# Patient Record
Sex: FEMALE | Race: WHITE | Employment: UNEMPLOYED | URBAN - NONMETROPOLITAN AREA
[De-identification: names, ages, dates, MRNs, and addresses within clinical notes are randomized per-mention and may not be internally consistent; named-entity substitution may affect disease eponyms.]

---

## 2017-01-26 ENCOUNTER — OFFICE VISIT (OUTPATIENT)
Dept: FAMILY MEDICINE CLINIC | Age: 62
End: 2017-01-26

## 2017-01-26 VITALS
HEART RATE: 80 BPM | DIASTOLIC BLOOD PRESSURE: 70 MMHG | BODY MASS INDEX: 20.7 KG/M2 | SYSTOLIC BLOOD PRESSURE: 120 MMHG | HEIGHT: 68 IN | WEIGHT: 136.6 LBS

## 2017-01-26 DIAGNOSIS — F60.7 DEPENDENT PERSONALITY DISORDER (HCC): Chronic | ICD-10-CM

## 2017-01-26 DIAGNOSIS — J01.90 ACUTE NON-RECURRENT SINUSITIS, UNSPECIFIED LOCATION: Primary | ICD-10-CM

## 2017-01-26 DIAGNOSIS — F41.1 GAD (GENERALIZED ANXIETY DISORDER): Chronic | ICD-10-CM

## 2017-01-26 PROCEDURE — 99213 OFFICE O/P EST LOW 20 MIN: CPT | Performed by: FAMILY MEDICINE

## 2017-01-26 RX ORDER — DIAZEPAM 5 MG/1
5 TABLET ORAL EVERY 8 HOURS PRN
Qty: 90 TABLET | Refills: 0 | Status: SHIPPED | OUTPATIENT
Start: 2017-01-26 | End: 2017-03-14 | Stop reason: SDUPTHER

## 2017-01-26 RX ORDER — SULFAMETHOXAZOLE AND TRIMETHOPRIM 800; 160 MG/1; MG/1
1 TABLET ORAL 2 TIMES DAILY
Qty: 20 TABLET | Refills: 0 | Status: SHIPPED | OUTPATIENT
Start: 2017-01-26 | End: 2017-02-05

## 2017-01-26 ASSESSMENT — ENCOUNTER SYMPTOMS
SORE THROAT: 1
COUGH: 1
SINUS PRESSURE: 1
GASTROINTESTINAL NEGATIVE: 1
SHORTNESS OF BREATH: 0

## 2017-03-14 DIAGNOSIS — F41.1 GAD (GENERALIZED ANXIETY DISORDER): Chronic | ICD-10-CM

## 2017-03-14 RX ORDER — DIAZEPAM 5 MG/1
5 TABLET ORAL EVERY 8 HOURS PRN
Qty: 90 TABLET | Refills: 0 | Status: SHIPPED | OUTPATIENT
Start: 2017-03-14 | End: 2017-05-01 | Stop reason: SDUPTHER

## 2017-05-01 DIAGNOSIS — F41.1 GAD (GENERALIZED ANXIETY DISORDER): Chronic | ICD-10-CM

## 2017-05-01 RX ORDER — DIAZEPAM 5 MG/1
5 TABLET ORAL EVERY 8 HOURS PRN
Qty: 90 TABLET | Refills: 0 | Status: SHIPPED | OUTPATIENT
Start: 2017-05-01 | End: 2017-05-18 | Stop reason: SDUPTHER

## 2017-05-18 ENCOUNTER — OFFICE VISIT (OUTPATIENT)
Dept: FAMILY MEDICINE CLINIC | Age: 62
End: 2017-05-18

## 2017-05-18 VITALS
HEIGHT: 68 IN | HEART RATE: 88 BPM | DIASTOLIC BLOOD PRESSURE: 80 MMHG | WEIGHT: 132.4 LBS | SYSTOLIC BLOOD PRESSURE: 120 MMHG | BODY MASS INDEX: 20.07 KG/M2

## 2017-05-18 DIAGNOSIS — F41.1 GAD (GENERALIZED ANXIETY DISORDER): Chronic | ICD-10-CM

## 2017-05-18 PROCEDURE — 99213 OFFICE O/P EST LOW 20 MIN: CPT | Performed by: FAMILY MEDICINE

## 2017-05-18 RX ORDER — BACLOFEN 20 MG/1
20 TABLET ORAL 2 TIMES DAILY
Qty: 60 TABLET | Refills: 5 | Status: SHIPPED | OUTPATIENT
Start: 2017-05-18 | End: 2019-01-04 | Stop reason: SDUPTHER

## 2017-05-18 RX ORDER — MIRTAZAPINE 15 MG/1
15 TABLET, FILM COATED ORAL NIGHTLY
Qty: 30 TABLET | Refills: 3 | Status: SHIPPED | OUTPATIENT
Start: 2017-05-18 | End: 2017-08-24 | Stop reason: CLARIF

## 2017-05-18 RX ORDER — DIAZEPAM 5 MG/1
5 TABLET ORAL EVERY 8 HOURS PRN
Qty: 90 TABLET | Refills: 0 | Status: SHIPPED | OUTPATIENT
Start: 2017-05-18 | End: 2017-06-20 | Stop reason: SDUPTHER

## 2017-05-18 ASSESSMENT — ENCOUNTER SYMPTOMS
RESPIRATORY NEGATIVE: 1
SHORTNESS OF BREATH: 0
GASTROINTESTINAL NEGATIVE: 1

## 2017-06-20 DIAGNOSIS — F41.1 GAD (GENERALIZED ANXIETY DISORDER): Chronic | ICD-10-CM

## 2017-06-20 RX ORDER — DIAZEPAM 5 MG/1
5 TABLET ORAL EVERY 8 HOURS PRN
Qty: 90 TABLET | Refills: 0 | Status: SHIPPED | OUTPATIENT
Start: 2017-06-20 | End: 2017-07-24 | Stop reason: SDUPTHER

## 2017-07-24 DIAGNOSIS — F41.1 GAD (GENERALIZED ANXIETY DISORDER): Chronic | ICD-10-CM

## 2017-07-24 RX ORDER — DIAZEPAM 5 MG/1
5 TABLET ORAL EVERY 8 HOURS PRN
Qty: 90 TABLET | Refills: 0 | Status: SHIPPED | OUTPATIENT
Start: 2017-07-24 | End: 2017-08-24 | Stop reason: SDUPTHER

## 2017-08-24 ENCOUNTER — OFFICE VISIT (OUTPATIENT)
Dept: FAMILY MEDICINE CLINIC | Age: 62
End: 2017-08-24
Payer: COMMERCIAL

## 2017-08-24 VITALS
HEART RATE: 58 BPM | SYSTOLIC BLOOD PRESSURE: 124 MMHG | WEIGHT: 132.2 LBS | DIASTOLIC BLOOD PRESSURE: 70 MMHG | BODY MASS INDEX: 20.03 KG/M2 | HEIGHT: 68 IN

## 2017-08-24 DIAGNOSIS — F41.1 GAD (GENERALIZED ANXIETY DISORDER): Chronic | ICD-10-CM

## 2017-08-24 PROCEDURE — 99212 OFFICE O/P EST SF 10 MIN: CPT | Performed by: FAMILY MEDICINE

## 2017-08-24 RX ORDER — DIAZEPAM 5 MG/1
5 TABLET ORAL EVERY 8 HOURS PRN
Qty: 90 TABLET | Refills: 2 | Status: SHIPPED | OUTPATIENT
Start: 2017-08-24 | End: 2017-11-21 | Stop reason: SDUPTHER

## 2017-11-20 ENCOUNTER — TELEPHONE (OUTPATIENT)
Dept: FAMILY MEDICINE CLINIC | Age: 62
End: 2017-11-20

## 2017-11-20 RX ORDER — FLUCONAZOLE 150 MG/1
150 TABLET ORAL DAILY
Qty: 1 TABLET | Refills: 0 | Status: SHIPPED | OUTPATIENT
Start: 2017-11-20 | End: 2017-11-21 | Stop reason: ALTCHOICE

## 2017-11-20 NOTE — TELEPHONE ENCOUNTER
Patient called states thinks has yeast infection. She would like to know if you would order something for her. She does have an appointment tomorrow.

## 2017-11-21 ENCOUNTER — OFFICE VISIT (OUTPATIENT)
Dept: FAMILY MEDICINE CLINIC | Age: 62
End: 2017-11-21
Payer: COMMERCIAL

## 2017-11-21 VITALS
HEART RATE: 80 BPM | SYSTOLIC BLOOD PRESSURE: 132 MMHG | WEIGHT: 132.6 LBS | DIASTOLIC BLOOD PRESSURE: 84 MMHG | BODY MASS INDEX: 20.1 KG/M2 | HEIGHT: 68 IN

## 2017-11-21 DIAGNOSIS — F41.1 GAD (GENERALIZED ANXIETY DISORDER): Chronic | ICD-10-CM

## 2017-11-21 PROCEDURE — 99212 OFFICE O/P EST SF 10 MIN: CPT | Performed by: FAMILY MEDICINE

## 2017-11-21 RX ORDER — DIAZEPAM 5 MG/1
5 TABLET ORAL EVERY 8 HOURS PRN
Qty: 90 TABLET | Refills: 2 | Status: SHIPPED | OUTPATIENT
Start: 2017-11-21 | End: 2019-01-04 | Stop reason: SDUPTHER

## 2017-11-21 ASSESSMENT — ENCOUNTER SYMPTOMS
ABDOMINAL PAIN: 0
SHORTNESS OF BREATH: 0

## 2017-11-21 NOTE — PATIENT INSTRUCTIONS
play a relaxation tape while you drive a car. When should you call for help? Call 911 anytime you think you may need emergency care. For example, call if:  · You feel you cannot stop from hurting yourself or someone else. Keep the numbers for these national suicide hotlines: 2-228-497-TALK (9-172.367.8837) and 8-730-CXJSXDC (2-539.914.4417). If you or someone you know talks about suicide or feeling hopeless, get help right away. Watch closely for changes in your health, and be sure to contact your doctor if:  · You have anxiety or fear that affects your life. · You have symptoms of anxiety that are new or different from those you had before. Where can you learn more? Go to https://[employer]. org and sign in to your [employer] account. Enter P754 in the Catapulter box to learn more about \"Anxiety Disorder: Care Instructions. \"     If you do not have an account, please click on the \"Sign Up Now\" link. Current as of: July 26, 2016  Content Version: 11.3  © 1228-8365 [employer], [employer]. Care instructions adapted under license by [facility] ([facility]). If you have questions about a medical condition or this instruction, always ask your healthcare professional. [address] any warranty or liability for your use of this information.

## 2019-01-04 ENCOUNTER — OFFICE VISIT (OUTPATIENT)
Dept: FAMILY MEDICINE CLINIC | Age: 64
End: 2019-01-04
Payer: COMMERCIAL

## 2019-01-04 VITALS
DIASTOLIC BLOOD PRESSURE: 76 MMHG | WEIGHT: 132.8 LBS | BODY MASS INDEX: 20.13 KG/M2 | HEIGHT: 68 IN | HEART RATE: 82 BPM | SYSTOLIC BLOOD PRESSURE: 132 MMHG

## 2019-01-04 DIAGNOSIS — M15.9 PRIMARY OSTEOARTHRITIS INVOLVING MULTIPLE JOINTS: ICD-10-CM

## 2019-01-04 DIAGNOSIS — Z00.00 WELL ADULT HEALTH CHECK: Primary | ICD-10-CM

## 2019-01-04 DIAGNOSIS — N95.1 SYMPTOMATIC MENOPAUSAL OR FEMALE CLIMACTERIC STATES: ICD-10-CM

## 2019-01-04 DIAGNOSIS — Z12.39 BREAST CANCER SCREENING: ICD-10-CM

## 2019-01-04 DIAGNOSIS — M79.7 FIBROMYALGIA: Chronic | ICD-10-CM

## 2019-01-04 DIAGNOSIS — F41.1 GAD (GENERALIZED ANXIETY DISORDER): Chronic | ICD-10-CM

## 2019-01-04 PROBLEM — M15.0 PRIMARY OSTEOARTHRITIS INVOLVING MULTIPLE JOINTS: Status: ACTIVE | Noted: 2019-01-04

## 2019-01-04 PROCEDURE — 99396 PREV VISIT EST AGE 40-64: CPT | Performed by: FAMILY MEDICINE

## 2019-01-04 RX ORDER — DIAZEPAM 5 MG/1
5 TABLET ORAL NIGHTLY PRN
Qty: 30 TABLET | Refills: 2 | Status: SHIPPED | OUTPATIENT
Start: 2019-01-04 | End: 2019-03-29 | Stop reason: SDUPTHER

## 2019-01-04 RX ORDER — BACLOFEN 20 MG/1
20 TABLET ORAL 2 TIMES DAILY
Qty: 60 TABLET | Refills: 5 | Status: SHIPPED | OUTPATIENT
Start: 2019-01-04 | End: 2020-05-21 | Stop reason: SDUPTHER

## 2019-01-04 ASSESSMENT — PATIENT HEALTH QUESTIONNAIRE - PHQ9
SUM OF ALL RESPONSES TO PHQ9 QUESTIONS 1 & 2: 1
2. FEELING DOWN, DEPRESSED OR HOPELESS: 0
SUM OF ALL RESPONSES TO PHQ QUESTIONS 1-9: 1
SUM OF ALL RESPONSES TO PHQ QUESTIONS 1-9: 1
1. LITTLE INTEREST OR PLEASURE IN DOING THINGS: 1

## 2019-01-04 ASSESSMENT — ENCOUNTER SYMPTOMS
RESPIRATORY NEGATIVE: 1
GASTROINTESTINAL NEGATIVE: 1
SHORTNESS OF BREATH: 0

## 2019-02-18 ENCOUNTER — TELEPHONE (OUTPATIENT)
Dept: FAMILY MEDICINE CLINIC | Age: 64
End: 2019-02-18

## 2019-02-18 RX ORDER — FLUCONAZOLE 150 MG/1
150 TABLET ORAL DAILY
Qty: 1 TABLET | Refills: 0 | Status: SHIPPED | OUTPATIENT
Start: 2019-02-18 | End: 2019-02-19

## 2019-02-20 ENCOUNTER — TELEPHONE (OUTPATIENT)
Dept: FAMILY MEDICINE CLINIC | Age: 64
End: 2019-02-20

## 2019-03-04 ENCOUNTER — HOSPITAL ENCOUNTER (OUTPATIENT)
Dept: WOMENS IMAGING | Age: 64
Discharge: HOME | End: 2019-03-04
Payer: COMMERCIAL

## 2019-03-04 DIAGNOSIS — N95.1 SYMPTOMATIC MENOPAUSAL OR FEMALE CLIMACTERIC STATES: ICD-10-CM

## 2019-03-04 PROCEDURE — 77080 DXA BONE DENSITY AXIAL: CPT

## 2019-03-05 ENCOUNTER — TELEPHONE (OUTPATIENT)
Dept: FAMILY MEDICINE CLINIC | Age: 64
End: 2019-03-05

## 2019-03-29 ENCOUNTER — OFFICE VISIT (OUTPATIENT)
Dept: FAMILY MEDICINE CLINIC | Age: 64
End: 2019-03-29
Payer: COMMERCIAL

## 2019-03-29 VITALS
WEIGHT: 134.2 LBS | TEMPERATURE: 98 F | SYSTOLIC BLOOD PRESSURE: 128 MMHG | BODY MASS INDEX: 20.34 KG/M2 | DIASTOLIC BLOOD PRESSURE: 84 MMHG | HEART RATE: 88 BPM | HEIGHT: 68 IN

## 2019-03-29 DIAGNOSIS — F41.1 GAD (GENERALIZED ANXIETY DISORDER): Chronic | ICD-10-CM

## 2019-03-29 DIAGNOSIS — J11.1 INFLUENZA: Primary | ICD-10-CM

## 2019-03-29 PROCEDURE — 99213 OFFICE O/P EST LOW 20 MIN: CPT | Performed by: FAMILY MEDICINE

## 2019-03-29 RX ORDER — PROMETHAZINE HYDROCHLORIDE AND CODEINE PHOSPHATE 6.25; 1 MG/5ML; MG/5ML
5 SYRUP ORAL EVERY 4 HOURS PRN
Qty: 120 ML | Refills: 1 | Status: SHIPPED | OUTPATIENT
Start: 2019-03-29 | End: 2019-04-03

## 2019-03-29 RX ORDER — DIAZEPAM 5 MG/1
5 TABLET ORAL NIGHTLY PRN
Qty: 30 TABLET | Refills: 2 | Status: SHIPPED | OUTPATIENT
Start: 2019-03-29 | End: 2019-06-28 | Stop reason: SDUPTHER

## 2019-06-28 ENCOUNTER — OFFICE VISIT (OUTPATIENT)
Dept: FAMILY MEDICINE CLINIC | Age: 64
End: 2019-06-28
Payer: COMMERCIAL

## 2019-06-28 VITALS
SYSTOLIC BLOOD PRESSURE: 126 MMHG | BODY MASS INDEX: 20.61 KG/M2 | HEIGHT: 68 IN | DIASTOLIC BLOOD PRESSURE: 68 MMHG | HEART RATE: 80 BPM | WEIGHT: 136 LBS

## 2019-06-28 DIAGNOSIS — F51.01 PRIMARY INSOMNIA: ICD-10-CM

## 2019-06-28 DIAGNOSIS — F41.1 GAD (GENERALIZED ANXIETY DISORDER): Primary | Chronic | ICD-10-CM

## 2019-06-28 PROCEDURE — 99213 OFFICE O/P EST LOW 20 MIN: CPT | Performed by: FAMILY MEDICINE

## 2019-06-28 RX ORDER — FLUCONAZOLE 150 MG/1
150 TABLET ORAL DAILY
Qty: 1 TABLET | Refills: 0 | Status: SHIPPED | OUTPATIENT
Start: 2019-06-28 | End: 2019-06-29

## 2019-06-28 RX ORDER — M-VIT,TX,IRON,MINS/CALC/FOLIC 27MG-0.4MG
1 TABLET ORAL DAILY
COMMUNITY

## 2019-06-28 RX ORDER — DIAZEPAM 5 MG/1
5 TABLET ORAL NIGHTLY PRN
Qty: 30 TABLET | Refills: 2 | Status: SHIPPED | OUTPATIENT
Start: 2019-06-28 | End: 2019-09-25 | Stop reason: SDUPTHER

## 2019-06-28 ASSESSMENT — ENCOUNTER SYMPTOMS
RESPIRATORY NEGATIVE: 1
GASTROINTESTINAL NEGATIVE: 1
ABDOMINAL PAIN: 0
SHORTNESS OF BREATH: 0

## 2019-06-28 NOTE — PROGRESS NOTES
fluconazole (DIFLUCAN) 150 MG tablet     Sig: Take 1 tablet by mouth daily for 1 day     Dispense:  1 tablet     Refill:  0           Electronically signed by Chelsey Meléndez 6/28/2019 at 12:45 PM

## 2019-07-08 ENCOUNTER — HOSPITAL ENCOUNTER (OUTPATIENT)
Dept: WOMENS IMAGING | Age: 64
Discharge: HOME | End: 2019-07-08
Payer: COMMERCIAL

## 2019-07-08 DIAGNOSIS — Z12.39 BREAST CANCER SCREENING: ICD-10-CM

## 2019-07-08 PROCEDURE — 77067 SCR MAMMO BI INCL CAD: CPT

## 2019-07-11 ENCOUNTER — HOSPITAL ENCOUNTER (OUTPATIENT)
Dept: WOMENS IMAGING | Age: 64
Discharge: HOME | End: 2019-07-11
Payer: COMMERCIAL

## 2019-07-11 DIAGNOSIS — Z00.6 ENCOUNTER FOR EXAMINATION FOR NORMAL COMPARISON OR CONTROL IN CLINICAL RESEARCH PROGRAM: ICD-10-CM

## 2019-07-11 PROCEDURE — 3209999900 MAM COMPARISON OF OUTSIDE IMAGES

## 2019-09-18 ENCOUNTER — TELEPHONE (OUTPATIENT)
Dept: FAMILY MEDICINE CLINIC | Age: 64
End: 2019-09-18

## 2019-09-18 DIAGNOSIS — M54.12 CERVICAL RADICULOPATHY AT C5: Primary | Chronic | ICD-10-CM

## 2019-09-18 RX ORDER — HYDROCODONE BITARTRATE AND ACETAMINOPHEN 5; 325 MG/1; MG/1
1 TABLET ORAL EVERY 6 HOURS PRN
Qty: 12 TABLET | Refills: 0 | Status: SHIPPED | OUTPATIENT
Start: 2019-09-18 | End: 2019-09-21

## 2019-09-18 NOTE — TELEPHONE ENCOUNTER
Patient called [facility] has appointment scheduled tomorrow but have been called to [place] because niece is on Hospice and dying. She is unable to keep appointment tomorrow. States that they will be traveling to [place] or early tomorrow morning. She is having leg pain related to previous injury. She has used Ultram, Tylenol,ice, heat and TENS unit. States none of that has helped with the pain. She is wondering if you would order pain medication for her to help her have pain under control to travel and deal with her niece dying. She uses [facility] in [place]. She will check there later today.

## 2019-09-25 DIAGNOSIS — F41.1 GAD (GENERALIZED ANXIETY DISORDER): Chronic | ICD-10-CM

## 2019-09-25 DIAGNOSIS — F51.01 PRIMARY INSOMNIA: ICD-10-CM

## 2019-09-25 RX ORDER — DIAZEPAM 5 MG/1
5 TABLET ORAL NIGHTLY PRN
Qty: 30 TABLET | Refills: 2 | Status: SHIPPED | OUTPATIENT
Start: 2019-09-25 | End: 2019-12-26 | Stop reason: SDUPTHER

## 2019-10-02 ENCOUNTER — OFFICE VISIT (OUTPATIENT)
Dept: FAMILY MEDICINE CLINIC | Age: 64
End: 2019-10-02
Payer: COMMERCIAL

## 2019-10-02 VITALS
WEIGHT: 138 LBS | DIASTOLIC BLOOD PRESSURE: 84 MMHG | HEIGHT: 68 IN | SYSTOLIC BLOOD PRESSURE: 130 MMHG | BODY MASS INDEX: 20.92 KG/M2 | HEART RATE: 84 BPM

## 2019-10-02 DIAGNOSIS — F51.01 PRIMARY INSOMNIA: ICD-10-CM

## 2019-10-02 DIAGNOSIS — F41.1 GAD (GENERALIZED ANXIETY DISORDER): Primary | ICD-10-CM

## 2019-10-02 DIAGNOSIS — M79.7 FIBROMYALGIA: ICD-10-CM

## 2019-10-02 PROCEDURE — 99213 OFFICE O/P EST LOW 20 MIN: CPT | Performed by: FAMILY MEDICINE

## 2019-10-02 ASSESSMENT — ENCOUNTER SYMPTOMS
SHORTNESS OF BREATH: 0
GASTROINTESTINAL NEGATIVE: 1
RESPIRATORY NEGATIVE: 1

## 2019-12-26 RX ORDER — DIAZEPAM 5 MG/1
5 TABLET ORAL NIGHTLY PRN
Qty: 30 TABLET | Refills: 2 | Status: SHIPPED | OUTPATIENT
Start: 2019-12-26 | End: 2020-03-23 | Stop reason: SDUPTHER

## 2019-12-26 NOTE — TELEPHONE ENCOUNTER
[place] called requesting a refill on the following medications:  Requested Prescriptions     Pending Prescriptions Disp Refills    diazepam (VALIUM) 5 MG tablet 30 tablet 2     Sig: Take 1 tablet by mouth nightly as needed for Anxiety for up to 30 days.        Date of last visit: 10/2/2019  Date of next visit (if applicable):1/2/2020  Date of last refill: 09/25/2019  Pharmacy Name: [address] [place] [address]      Thanks,  Marylynn Goltz, [address] [place] [address]

## 2019-12-26 NOTE — TELEPHONE ENCOUNTER
Cristela Ramirez called requesting a refill on the following medications:  Requested Prescriptions     Pending Prescriptions Disp Refills    diazepam (VALIUM) 5 MG tablet 30 tablet 2     Sig: Take 1 tablet by mouth nightly as needed for Anxiety for up to 30 days. Pharmacy verified: [facility]      Date of last visit: 10/2/19  Date of next visit (if applicable): 5/9/8958    Patient states she runs out on 12/28

## 2020-01-02 ENCOUNTER — OFFICE VISIT (OUTPATIENT)
Dept: FAMILY MEDICINE CLINIC | Age: 65
End: 2020-01-02
Payer: COMMERCIAL

## 2020-01-02 VITALS
BODY MASS INDEX: 21.01 KG/M2 | SYSTOLIC BLOOD PRESSURE: 134 MMHG | HEART RATE: 84 BPM | WEIGHT: 138.6 LBS | DIASTOLIC BLOOD PRESSURE: 76 MMHG | HEIGHT: 68 IN

## 2020-01-02 PROCEDURE — 99213 OFFICE O/P EST LOW 20 MIN: CPT | Performed by: FAMILY MEDICINE

## 2020-01-02 SDOH — ECONOMIC STABILITY: FOOD INSECURITY: WITHIN THE PAST 12 MONTHS, THE FOOD YOU BOUGHT JUST DIDN'T LAST AND YOU DIDN'T HAVE MONEY TO GET MORE.: NEVER TRUE

## 2020-01-02 SDOH — ECONOMIC STABILITY: TRANSPORTATION INSECURITY
IN THE PAST 12 MONTHS, HAS LACK OF TRANSPORTATION KEPT YOU FROM MEETINGS, WORK, OR FROM GETTING THINGS NEEDED FOR DAILY LIVING?: NO

## 2020-01-02 SDOH — ECONOMIC STABILITY: TRANSPORTATION INSECURITY
IN THE PAST 12 MONTHS, HAS THE LACK OF TRANSPORTATION KEPT YOU FROM MEDICAL APPOINTMENTS OR FROM GETTING MEDICATIONS?: NO

## 2020-01-02 SDOH — ECONOMIC STABILITY: FOOD INSECURITY: WITHIN THE PAST 12 MONTHS, YOU WORRIED THAT YOUR FOOD WOULD RUN OUT BEFORE YOU GOT MONEY TO BUY MORE.: NEVER TRUE

## 2020-01-02 SDOH — ECONOMIC STABILITY: INCOME INSECURITY: HOW HARD IS IT FOR YOU TO PAY FOR THE VERY BASICS LIKE FOOD, HOUSING, MEDICAL CARE, AND HEATING?: SOMEWHAT HARD

## 2020-01-02 ASSESSMENT — ENCOUNTER SYMPTOMS
RESPIRATORY NEGATIVE: 1
ABDOMINAL PAIN: 0
GASTROINTESTINAL NEGATIVE: 1
BACK PAIN: 1
SHORTNESS OF BREATH: 0

## 2020-01-02 ASSESSMENT — PATIENT HEALTH QUESTIONNAIRE - PHQ9
2. FEELING DOWN, DEPRESSED OR HOPELESS: 1
SUM OF ALL RESPONSES TO PHQ QUESTIONS 1-9: 1
SUM OF ALL RESPONSES TO PHQ QUESTIONS 1-9: 1
SUM OF ALL RESPONSES TO PHQ9 QUESTIONS 1 & 2: 1
1. LITTLE INTEREST OR PLEASURE IN DOING THINGS: 0

## 2020-01-02 NOTE — PROGRESS NOTES
[facility]  [address] [facility]  Dept: [phone]  Dept Fax: [phone]: [phone]    Visit Date: 1/2/2020    Adriane Aguilar is a 62 y. o.female who presents today for:   Chief Complaint   Patient presents with    3 Month Follow-Up    Anxiety         HPI:      There was a time when Pandora Crigler was under different insurance and during that time (2018) she fractured her left hip and was treated with pinning. We do not seem to have all those records. She is not one to follow with doctors. She has on going pain and limitation and needs ortho follow up. She had a visit with Dr. Neyda Grande I believe. Using Valium at  with good results for sleep. Personality issues continue. Living at home. Has BF. She struggles with dealing with people and has not worked much in her adult life. Current Outpatient Medications   Medication Sig Dispense Refill    diazepam (VALIUM) 5 MG tablet Take 1 tablet by mouth nightly as needed for Anxiety for up to 30 days. 30 tablet 2    Calcium-Magnesium-Vitamin D (CALCIUM 500 PO) Take 1 tablet by mouth daily      vitamin D (CHOLECALCIFEROL) 1000 UNIT TABS tablet Take 1,000 Units by mouth daily      Multiple Vitamins-Minerals (THERAPEUTIC MULTIVITAMIN-MINERALS) tablet Take 1 tablet by mouth daily      baclofen (LIORESAL) 20 MG tablet Take 1 tablet by mouth 2 times daily 60 tablet 5     No current facility-administered medications for this visit. The patient is allergic to aspirin; ibuprofen; remeron [mirtazapine]; morphine; and trazodone and nefazodone. Subjective:      Review of Systems   Constitutional: Positive for activity change. Negative for appetite change and unexpected weight change. HENT: Negative. Respiratory: Negative. Negative for shortness of breath. Cardiovascular: Negative. Negative for chest pain. Gastrointestinal: Negative.   Negative for Chronic Pain Monitoring:   RX Monitoring 12/26/2019   Attestation -   Acute Pain Prescriptions -   Periodic Controlled Substance Monitoring Possible medication side effects, risk of tolerance/dependence & alternative treatments discussed. ;No signs of potential drug abuse or diversion identified. Chronic Pain > 80 MEDD -       We have been through various SSRI choices and she will not consider different treatment. We will get well labs. She has had a flu shot. We discussed my FCI and she may elect to see Dr. Cassy Regan. ..... No follow-ups on file. Orders Placed:  Orders Placed This Encounter   Procedures    Lipid Panel     Standing Status:   Future     Standing Expiration Date:   1/1/2021     Order Specific Question:   Is Patient Fasting?/# of Hours     Answer:   12 hours    Comprehensive Metabolic Panel     Standing Status:   Future     Standing Expiration Date:   1/1/2021    CBC Auto Differential     Standing Status:   Future     Standing Expiration Date:   1/1/2021   Reny Orellana MD, Orthopedic Surgery, [facility]     Referral Priority:   Routine     Referral Type:   Eval and Treat     Referral Reason:   Specialty Services Required     Referred to Provider:   Lakeshia Mcgee MD     Requested Specialty:   Orthopedic Surgery     Number of Visits Requested:   1     Medications Prescribed:  No orders of the defined types were placed in this encounter.         Electronically signed by Cherelle Ramsey, [facility] 1/2/2020 at 9:15 AM

## 2020-01-16 ENCOUNTER — NURSE ONLY (OUTPATIENT)
Dept: FAMILY MEDICINE CLINIC | Age: 65
End: 2020-01-16
Payer: COMMERCIAL

## 2020-01-16 ENCOUNTER — TELEPHONE (OUTPATIENT)
Dept: FAMILY MEDICINE CLINIC | Age: 65
End: 2020-01-16

## 2020-01-16 LAB
ALBUMIN SERPL-MCNC: 4.9 G/DL (ref 3.5–5.1)
ALP BLD-CCNC: 96 U/L (ref 38–126)
ALT SERPL-CCNC: 15 U/L (ref 11–66)
ANION GAP SERPL CALCULATED.3IONS-SCNC: 10 MEQ/L (ref 8–16)
AST SERPL-CCNC: 19 U/L (ref 5–40)
BASOPHILS # BLD: 0.7 %
BASOPHILS ABSOLUTE: 0.1 THOU/MM3 (ref 0–0.1)
BILIRUB SERPL-MCNC: 0.4 MG/DL (ref 0.3–1.2)
BUN BLDV-MCNC: 12 MG/DL (ref 7–22)
CALCIUM SERPL-MCNC: 10 MG/DL (ref 8.5–10.5)
CHLORIDE BLD-SCNC: 105 MEQ/L (ref 98–111)
CHOLESTEROL, TOTAL: 228 MG/DL (ref 100–199)
CO2: 27 MEQ/L (ref 23–33)
CREAT SERPL-MCNC: 0.9 MG/DL (ref 0.4–1.2)
EOSINOPHIL # BLD: 2.7 %
EOSINOPHILS ABSOLUTE: 0.2 THOU/MM3 (ref 0–0.4)
ERYTHROCYTE [DISTWIDTH] IN BLOOD BY AUTOMATED COUNT: 12.2 % (ref 11.5–14.5)
ERYTHROCYTE [DISTWIDTH] IN BLOOD BY AUTOMATED COUNT: 44.2 FL (ref 35–45)
GFR SERPL CREATININE-BSD FRML MDRD: 63 ML/MIN/1.73M2
GLUCOSE BLD-MCNC: 96 MG/DL (ref 70–108)
HCT VFR BLD CALC: 47 % (ref 37–47)
HDLC SERPL-MCNC: 75 MG/DL
HEMOGLOBIN: 15.7 GM/DL (ref 12–16)
IMMATURE GRANS (ABS): 0.02 THOU/MM3 (ref 0–0.07)
IMMATURE GRANULOCYTES: 0.2 %
LDL CHOLESTEROL CALCULATED: 136 MG/DL
LYMPHOCYTES # BLD: 28.1 %
LYMPHOCYTES ABSOLUTE: 2.4 THOU/MM3 (ref 1–4.8)
MCH RBC QN AUTO: 32.7 PG (ref 26–33)
MCHC RBC AUTO-ENTMCNC: 33.4 GM/DL (ref 32.2–35.5)
MCV RBC AUTO: 97.9 FL (ref 81–99)
MONOCYTES # BLD: 6.9 %
MONOCYTES ABSOLUTE: 0.6 THOU/MM3 (ref 0.4–1.3)
NUCLEATED RED BLOOD CELLS: 0 /100 WBC
PLATELET # BLD: 258 THOU/MM3 (ref 130–400)
PMV BLD AUTO: 9.5 FL (ref 9.4–12.4)
POTASSIUM SERPL-SCNC: 4.2 MEQ/L (ref 3.5–5.2)
RBC # BLD: 4.8 MILL/MM3 (ref 4.2–5.4)
SEG NEUTROPHILS: 61.4 %
SEGMENTED NEUTROPHILS ABSOLUTE COUNT: 5.2 THOU/MM3 (ref 1.8–7.7)
SODIUM BLD-SCNC: 142 MEQ/L (ref 135–145)
TOTAL PROTEIN: 7.4 G/DL (ref 6.1–8)
TRIGL SERPL-MCNC: 86 MG/DL (ref 0–199)
WBC # BLD: 8.4 THOU/MM3 (ref 4.8–10.8)

## 2020-01-16 PROCEDURE — 36415 COLL VENOUS BLD VENIPUNCTURE: CPT | Performed by: FAMILY MEDICINE

## 2020-03-23 RX ORDER — DIAZEPAM 5 MG/1
5 TABLET ORAL NIGHTLY PRN
Qty: 30 TABLET | Refills: 5 | Status: SHIPPED | OUTPATIENT
Start: 2020-03-23 | End: 2020-08-26 | Stop reason: SDUPTHER

## 2020-03-23 NOTE — TELEPHONE ENCOUNTER
Controlled Substance Monitoring:    Acute and Chronic Pain Monitoring:   RX Monitoring 3/23/2020   Attestation -   Acute Pain Prescriptions -   Periodic Controlled Substance Monitoring Possible medication side effects, risk of tolerance/dependence & alternative treatments discussed. ;No signs of potential drug abuse or diversion identified.    Chronic Pain > 80 MEDD -

## 2020-05-21 RX ORDER — BACLOFEN 20 MG/1
20 TABLET ORAL 2 TIMES DAILY
Qty: 60 TABLET | Refills: 5 | Status: SHIPPED | OUTPATIENT
Start: 2020-05-21 | End: 2020-08-26 | Stop reason: SDUPTHER

## 2020-08-26 ENCOUNTER — OFFICE VISIT (OUTPATIENT)
Dept: FAMILY MEDICINE CLINIC | Age: 65
End: 2020-08-26
Payer: COMMERCIAL

## 2020-08-26 VITALS
DIASTOLIC BLOOD PRESSURE: 72 MMHG | WEIGHT: 141.4 LBS | OXYGEN SATURATION: 98 % | TEMPERATURE: 97.4 F | SYSTOLIC BLOOD PRESSURE: 120 MMHG | HEIGHT: 68 IN | RESPIRATION RATE: 14 BRPM | BODY MASS INDEX: 21.43 KG/M2 | HEART RATE: 66 BPM

## 2020-08-26 PROCEDURE — 99213 OFFICE O/P EST LOW 20 MIN: CPT | Performed by: FAMILY MEDICINE

## 2020-08-26 RX ORDER — DIAZEPAM 5 MG/1
5 TABLET ORAL NIGHTLY PRN
Qty: 30 TABLET | Refills: 4 | Status: SHIPPED | OUTPATIENT
Start: 2020-08-26 | End: 2021-01-26 | Stop reason: SDUPTHER

## 2020-08-26 RX ORDER — BACLOFEN 20 MG/1
20 TABLET ORAL 2 TIMES DAILY PRN
Qty: 60 TABLET | Refills: 5 | Status: SHIPPED | OUTPATIENT
Start: 2020-08-26 | End: 2021-02-24 | Stop reason: SDUPTHER

## 2020-08-26 ASSESSMENT — ENCOUNTER SYMPTOMS: SHORTNESS OF BREATH: 0

## 2020-08-26 NOTE — PROGRESS NOTES
[address] [place] [address] [address], [address], [place]  Phone:  [phone]  VUI:[phone]       Name: Ruperto Cheek  : 1955    Chief Complaint   Patient presents with    Annual Exam     [address] Maintenance     Pap scheduled next month with GYN, Going to GI for Colonoscopy       HPI:     Ruperto Cheek is a 60 y.o. female who presents today for     HPI    Patient with h/o CAMILLE and insomnia but only uses valium 5 mg. Starts crying as she talks about stress -- selling her home, financing issues.  passed away of 4 years ago. Has children and son-in-law who do help. Has failed a couple of SSRI/Remeron. Seeing a boyfriend. Will see Gyn for above prevention care. Denies confusion, dizziness, near falls. Also with fibromyalgia -- elavil did not work for her. Current Outpatient Medications:     Olive Leaf Extract 150 MG CAPS, Take by mouth daily, Disp: , Rfl:     diazePAM (VALIUM) 5 MG tablet, Take 1 tablet by mouth nightly as needed for Anxiety for up to 30 days. , Disp: 30 tablet, Rfl: 4    baclofen (LIORESAL) 20 MG tablet, Take 1 tablet by mouth 2 times daily as needed (muscle pain), Disp: 60 tablet, Rfl: 5    Calcium-Magnesium-Vitamin D (CALCIUM 500 PO), Take 1 tablet by mouth daily, Disp: , Rfl:     vitamin D (CHOLECALCIFEROL) 1000 UNIT TABS tablet, Take 1,000 Units by mouth daily, Disp: , Rfl:     Multiple Vitamins-Minerals (THERAPEUTIC MULTIVITAMIN-MINERALS) tablet, Take 1 tablet by mouth daily, Disp: , Rfl:     Allergies   Allergen Reactions    Aspirin Nausea Only    Elavil [Amitriptyline Hcl]      Felt foggy and did not feel right    Ibuprofen Other (See Comments)     Abdomen pain     Remeron [Mirtazapine] Other (See Comments)     CNS side effects.  Morphine Nausea And Vomiting    Trazodone And Nefazodone Nausea Only       Review of Systems   Constitutional: Negative for fatigue and fever.    Respiratory: Negative for shortness of breath. Cardiovascular: Negative for chest pain and leg swelling. Psychiatric/Behavioral: The patient is nervous/anxious. Objective:     /72 (Site: Left Upper Arm, Position: Sitting, Cuff Size: Medium Adult)   Pulse 66   Temp 97.4 °F (36.3 °C) (Temporal)   Resp 14   Ht 5' 8\" (1.727 m)   Wt 141 lb 6.4 oz (64.1 kg)   SpO2 98%   BMI 21.50 kg/m²     Physical Exam  Constitutional:       General: She is not in acute distress. Appearance: Normal appearance. She is not ill-appearing. Cardiovascular:      Rate and Rhythm: Normal rate and regular rhythm. Heart sounds: No murmur. Pulmonary:      Effort: Pulmonary effort is normal. No respiratory distress. Breath sounds: Normal breath sounds. No wheezing. Musculoskeletal:         General: No swelling. Neurological:      Mental Status: She is alert. Psychiatric:         Attention and Perception: Attention normal.         Mood and Affect: Mood is anxious. Speech: Speech normal.         Behavior: Behavior normal.         Thought Content: Thought content normal.         Cognition and Memory: Cognition normal.         Assessment/Plan:     Marvin Redd was seen today for annual exam and health maintenance. Diagnoses and all orders for this visit:    CAMILLE (generalized anxiety disorder)  -     diazePAM (VALIUM) 5 MG tablet; Take 1 tablet by mouth nightly as needed for Anxiety for up to 30 days.  -     Basic Metabolic Panel; Future  -     CBC Auto Differential; Future  -     Hepatic Function Panel; Future  -     Lipid Panel; Future    Primary insomnia  -     diazePAM (VALIUM) 5 MG tablet; Take 1 tablet by mouth nightly as needed for Anxiety for up to 30 days.  -     Basic Metabolic Panel; Future  -     CBC Auto Differential; Future  -     Hepatic Function Panel; Future  -     Lipid Panel; Future    Fibromyalgia  -     diazePAM (VALIUM) 5 MG tablet;  Take 1 tablet by mouth nightly as needed for Anxiety for up to 30 days.  -     Basic

## 2020-08-26 NOTE — PATIENT INSTRUCTIONS
Victor Hugo Bryant your records show you are due for the Shingrix vaccination. This is a vaccine to protect you from getting shingles. It is 90% plus protective. This is a great improvement over the previous vaccine. Many insurance plans are covering this vaccine. We do recommend you consider being vaccinated with this product. Shingrix is available through our office or your local pharmacy depending on your insurance coverage. We would like you to contact your insurance company to:   1. Check Coverage   2. Find out where you should receive this; our office or your local pharmacy. Thank you! Victor Hugo Bryant your records indicate that you are due for a Tetanus vaccine. We would like you to contact your insurance company to:   1. Check Coverage   2. Find out where you should receive this; our office or your local pharmacy. Thank you!

## 2020-09-29 ENCOUNTER — OFFICE VISIT (OUTPATIENT)
Dept: SURGERY | Age: 65
End: 2020-09-29

## 2020-10-08 ENCOUNTER — HOSPITAL ENCOUNTER (OUTPATIENT)
Age: 65
Discharge: HOME | End: 2020-10-08
Payer: COMMERCIAL

## 2020-10-08 DIAGNOSIS — F41.1 GAD (GENERALIZED ANXIETY DISORDER): Chronic | ICD-10-CM

## 2020-10-08 DIAGNOSIS — M79.7 FIBROMYALGIA: Chronic | ICD-10-CM

## 2020-10-08 DIAGNOSIS — F51.01 PRIMARY INSOMNIA: ICD-10-CM

## 2020-10-08 LAB
ALBUMIN SERPL-MCNC: 4.4 G/DL (ref 3.5–5.1)
ALP BLD-CCNC: 95 U/L (ref 38–126)
ALT SERPL-CCNC: 16 U/L (ref 11–66)
ANION GAP SERPL CALCULATED.3IONS-SCNC: 10 MEQ/L (ref 8–16)
AST SERPL-CCNC: 20 U/L (ref 5–40)
BASOPHILS # BLD: 0.9 %
BASOPHILS ABSOLUTE: 0.1 THOU/MM3 (ref 0–0.1)
BILIRUB SERPL-MCNC: 0.4 MG/DL (ref 0.3–1.2)
BILIRUBIN DIRECT: < 0.2 MG/DL (ref 0–0.3)
BUN BLDV-MCNC: 14 MG/DL (ref 7–22)
CALCIUM SERPL-MCNC: 9.9 MG/DL (ref 8.5–10.5)
CHLORIDE BLD-SCNC: 105 MEQ/L (ref 98–111)
CHOLESTEROL, TOTAL: 253 MG/DL (ref 100–199)
CO2: 26 MEQ/L (ref 23–33)
CREAT SERPL-MCNC: 0.7 MG/DL (ref 0.4–1.2)
EOSINOPHIL # BLD: 3.7 %
EOSINOPHILS ABSOLUTE: 0.3 THOU/MM3 (ref 0–0.4)
ERYTHROCYTE [DISTWIDTH] IN BLOOD BY AUTOMATED COUNT: 12.5 % (ref 11.5–14.5)
ERYTHROCYTE [DISTWIDTH] IN BLOOD BY AUTOMATED COUNT: 46.1 FL (ref 35–45)
GFR SERPL CREATININE-BSD FRML MDRD: 84 ML/MIN/1.73M2
GLUCOSE BLD-MCNC: 95 MG/DL (ref 70–108)
HCT VFR BLD CALC: 47.7 % (ref 37–47)
HDLC SERPL-MCNC: 62 MG/DL
HEMOGLOBIN: 15.7 GM/DL (ref 12–16)
IMMATURE GRANS (ABS): 0.02 THOU/MM3 (ref 0–0.07)
IMMATURE GRANULOCYTES: 0.3 %
LDL CHOLESTEROL CALCULATED: 169 MG/DL
LYMPHOCYTES # BLD: 31.1 %
LYMPHOCYTES ABSOLUTE: 2.1 THOU/MM3 (ref 1–4.8)
MCH RBC QN AUTO: 32.7 PG (ref 26–33)
MCHC RBC AUTO-ENTMCNC: 32.9 GM/DL (ref 32.2–35.5)
MCV RBC AUTO: 99.4 FL (ref 81–99)
MONOCYTES # BLD: 7.3 %
MONOCYTES ABSOLUTE: 0.5 THOU/MM3 (ref 0.4–1.3)
NUCLEATED RED BLOOD CELLS: 0 /100 WBC
PLATELET # BLD: 302 THOU/MM3 (ref 130–400)
PMV BLD AUTO: 9.8 FL (ref 9.4–12.4)
POTASSIUM SERPL-SCNC: 4.3 MEQ/L (ref 3.5–5.2)
RBC # BLD: 4.8 MILL/MM3 (ref 4.2–5.4)
SEG NEUTROPHILS: 56.7 %
SEGMENTED NEUTROPHILS ABSOLUTE COUNT: 3.9 THOU/MM3 (ref 1.8–7.7)
SODIUM BLD-SCNC: 141 MEQ/L (ref 135–145)
TOTAL PROTEIN: 7 G/DL (ref 6.1–8)
TRIGL SERPL-MCNC: 112 MG/DL (ref 0–199)
WBC # BLD: 6.8 THOU/MM3 (ref 4.8–10.8)

## 2020-10-08 PROCEDURE — 85025 COMPLETE CBC W/AUTO DIFF WBC: CPT

## 2020-10-08 PROCEDURE — 36415 COLL VENOUS BLD VENIPUNCTURE: CPT

## 2020-10-08 PROCEDURE — 80061 LIPID PANEL: CPT

## 2020-10-08 PROCEDURE — 80053 COMPREHEN METABOLIC PANEL: CPT

## 2020-10-08 PROCEDURE — 82248 BILIRUBIN DIRECT: CPT

## 2020-10-12 ENCOUNTER — TELEPHONE (OUTPATIENT)
Dept: FAMILY MEDICINE CLINIC | Age: 65
End: 2020-10-12

## 2020-10-12 NOTE — RESULT ENCOUNTER NOTE
Please let patient know that her labs are within acceptable limits except her cholesterol is which elevated more than previously. The LDL (sticky cholesterol) increased from 136 to 169; it is best to keep it closer to 100 or <100. to prevent clogging of arteries a diet change is recommended but medications sometimes is needed. I encouraged higher amount of vegetables, and nuts. Reduce red meat, add seafood as an alternative. Use mor legumes/soup beans as lean protein. Good activity levels are also helpful. Will follow up at next office visit. Thank you.

## 2020-10-12 NOTE — TELEPHONE ENCOUNTER
----- Message from Tarsha Roque MD sent at 10/12/2020  7:36 AM EDT -----  Please let patient know that her labs are within acceptable limits except her cholesterol is which elevated more than previously. The LDL (sticky cholesterol) increased from 136 to 169; it is best to keep it closer to 100 or <100. to prevent clogging of arteries a diet change is recommended but medications sometimes is needed. I encouraged higher amount of vegetables, and nuts. Reduce red meat, add seafood as an alternative. Use mor legumes/soup beans as lean protein. Good activity levels are also helpful. Will follow up at next office visit. Thank you.

## 2020-11-19 ENCOUNTER — OFFICE VISIT (OUTPATIENT)
Dept: SURGERY | Age: 65
End: 2020-11-19
Payer: COMMERCIAL

## 2020-11-19 ENCOUNTER — PREP FOR PROCEDURE (OUTPATIENT)
Dept: SURGERY | Age: 65
End: 2020-11-19

## 2020-11-19 ENCOUNTER — TELEPHONE (OUTPATIENT)
Dept: SURGERY | Age: 65
End: 2020-11-19

## 2020-11-19 ENCOUNTER — HOSPITAL ENCOUNTER (OUTPATIENT)
Age: 65
Setting detail: SPECIMEN
Discharge: HOME | End: 2020-11-19
Payer: COMMERCIAL

## 2020-11-19 VITALS
DIASTOLIC BLOOD PRESSURE: 65 MMHG | HEIGHT: 68 IN | WEIGHT: 142 LBS | BODY MASS INDEX: 21.52 KG/M2 | SYSTOLIC BLOOD PRESSURE: 130 MMHG | RESPIRATION RATE: 15 BRPM | TEMPERATURE: 96.7 F | HEART RATE: 100 BPM | OXYGEN SATURATION: 98 %

## 2020-11-19 PROCEDURE — U0003 INFECTIOUS AGENT DETECTION BY NUCLEIC ACID (DNA OR RNA); SEVERE ACUTE RESPIRATORY SYNDROME CORONAVIRUS 2 (SARS-COV-2) (CORONAVIRUS DISEASE [COVID-19]), AMPLIFIED PROBE TECHNIQUE, MAKING USE OF HIGH THROUGHPUT TECHNOLOGIES AS DESCRIBED BY CMS-2020-01-R: HCPCS

## 2020-11-19 PROCEDURE — 99213 OFFICE O/P EST LOW 20 MIN: CPT | Performed by: SURGERY

## 2020-11-19 RX ORDER — VITAMIN B COMPLEX
1 CAPSULE ORAL DAILY
COMMUNITY

## 2020-11-19 RX ORDER — SODIUM CHLORIDE 450 MG/100ML
INJECTION, SOLUTION INTRAVENOUS CONTINUOUS
Status: CANCELLED | OUTPATIENT
Start: 2020-11-19

## 2020-11-19 ASSESSMENT — ENCOUNTER SYMPTOMS
EYE REDNESS: 0
SINUS PAIN: 0
CHEST TIGHTNESS: 0
STRIDOR: 0
RESPIRATORY NEGATIVE: 1
RHINORRHEA: 0
TROUBLE SWALLOWING: 0
ABDOMINAL PAIN: 1
APNEA: 0
DIARRHEA: 1
PHOTOPHOBIA: 0
ALLERGIC/IMMUNOLOGIC NEGATIVE: 1
BACK PAIN: 0
FACIAL SWELLING: 0
VOICE CHANGE: 0
EYE ITCHING: 0
EYE DISCHARGE: 0
WHEEZING: 0
SORE THROAT: 0
EYE PAIN: 0
EYES NEGATIVE: 1
CONSTIPATION: 1
SINUS PRESSURE: 0
COUGH: 0
COLOR CHANGE: 0
SHORTNESS OF BREATH: 0
CHOKING: 0

## 2020-11-19 NOTE — TELEPHONE ENCOUNTER
Scheduled Kayla Blanchard for a colonoscopy w/anesthesia on Wed 11/25 at 9am & she will arrive at 8am. The prep instructions & covid order were given to patient.

## 2020-11-19 NOTE — PROGRESS NOTES
[address] [place] [id number]  Dept: [phone]  Dept Fax: [phone]  Loc: [phone]    Visit Date: 11/19/2020    Phuong Villalta is a 60 y.o. female who presentstoday for:  Chief Complaint   Patient presents with    Surgical Consult     NP est pt last seen 2016- colonoscopy consult       HPI:     HPI 61-year-old white female with father had colon carcinoma and we have followed with colonoscopies over several years she also had a sigmoid colon resection for diverticular disease about 6 years ago she has a long history of irritable bowel fibromyalgia is currently struggling in this Covid environment not being able to get out and about nonetheless she has had no real change in bowel habits she still has classic irritable bowel symptoms with constipation followed by bouts of diarrhea and abdominal bloating her last colonoscopy was 5-1/2 years ago and per American Cancer Society guidelines for family history of colon cancer she is here to discuss colonoscopy she has had no weight loss but again does have a lot of problems with anxiety and again fibromyalgia and chronic pain    Past Medical History:   Diagnosis Date    Anxiety     Arthralgia     Arthritis     Cervical radiculopathy at C5 2/16/2015    Chronic abdominal pain     Diverticulitis     Fibromyalgia 8/21/2015    IBS (irritable bowel syndrome)     Primary osteoarthritis involving multiple joints 1/4/2019    Tension headache 2/16/2015      Past Surgical History:   Procedure Laterality Date    ABDOMINAL EXPLORATION SURGERY      APPENDECTOMY      BREAST BIOPSY Right 12/14/2016    Excisional - Dr. Oscar Olson  oct 2008    anterior with plate    CHOLECYSTECTOMY  3/4/08    Dr. Ruff  12/09/2013    Dr. Bull Miracle  11/15/11    Dr Sonia Brennan Left 06/2018    Trauma Femur    OTHER SURGICAL HISTORY  13    Sigmoid colon resection - Dr. Fortino Cummings      left    TONSILLECTOMY      as a child    UPPER GASTROINTESTINAL ENDOSCOPY  11/15/11    Dr Tulio Moser        Family History   Problem Relation Age of Onset    Colon Cancer Father     High Blood Pressure Mother     High Cholesterol Mother     Breast Cancer Maternal Aunt 36        breast    Cancer Maternal Uncle     Cancer Maternal Grandmother     Cancer Paternal Grandfather         colon        Social History     Tobacco Use    Smoking status: Current Some Day Smoker     Packs/day: 0.50     Years: 15.00     Pack years: 7.50     Types: Cigarettes     Last attempt to quit: 2012     Years since quittin.9    Smokeless tobacco: Never Used    Tobacco comment: trying to use the electric [employer]   Substance Use Topics    Alcohol use: Yes     Comment: occasionally          Current Outpatient Medications   Medication Sig Dispense Refill    b complex vitamins capsule Take 1 capsule by mouth daily      Olive Leaf Extract 150 MG CAPS Take by mouth daily      diazePAM (VALIUM) 5 MG tablet Take 1 tablet by mouth nightly as needed for Anxiety for up to 30 days. 30 tablet 4    baclofen (LIORESAL) 20 MG tablet Take 1 tablet by mouth 2 times daily as needed (muscle pain) 60 tablet 5    Calcium-Magnesium-Vitamin D (CALCIUM 500 PO) Take 1 tablet by mouth daily      vitamin D (CHOLECALCIFEROL) 1000 UNIT TABS tablet Take 1,000 Units by mouth daily      Multiple Vitamins-Minerals (THERAPEUTIC MULTIVITAMIN-MINERALS) tablet Take 1 tablet by mouth daily       No current facility-administered medications for this visit. Allergies   Allergen Reactions    Aspirin Nausea Only    Elavil [Amitriptyline Hcl]      Felt foggy and did not feel right    Ibuprofen Other (See Comments)     Abdomen pain     Remeron [Mirtazapine] Other (See Comments)     CNS side effects.     Morphine Nausea And Vomiting    Trazodone And Nefazodone Nausea Only       Subjective:      Review of Systems   Constitutional: Positive for activity change and fatigue. Negative for appetite change, chills, diaphoresis, fever and unexpected weight change. HENT: Negative. Negative for congestion, dental problem, drooling, ear discharge, ear pain, facial swelling, hearing loss, mouth sores, nosebleeds, postnasal drip, rhinorrhea, sinus pressure, sinus pain, sneezing, sore throat, tinnitus, trouble swallowing and voice change. Eyes: Negative. Negative for photophobia, pain, discharge, redness, itching and visual disturbance. Respiratory: Negative. Negative for apnea, cough, choking, chest tightness, shortness of breath, wheezing and stridor. Cardiovascular: Negative. Negative for chest pain, palpitations and leg swelling. Gastrointestinal: Positive for abdominal pain, constipation and diarrhea. Endocrine: Negative. Negative for cold intolerance, heat intolerance, polydipsia, polyphagia and polyuria. Genitourinary: Negative. Negative for decreased urine volume, difficulty urinating, dyspareunia, dysuria, enuresis, flank pain, frequency, genital sores, hematuria, menstrual problem, pelvic pain, urgency, vaginal bleeding, vaginal discharge and vaginal pain. Musculoskeletal: Positive for arthralgias and myalgias. Negative for back pain, gait problem, joint swelling, neck pain and neck stiffness. Skin: Negative for color change, pallor, rash and wound. Allergic/Immunologic: Negative. Negative for environmental allergies, food allergies and immunocompromised state. Neurological: Positive for weakness. Negative for dizziness, tremors, seizures, syncope, facial asymmetry, speech difficulty, light-headedness, numbness and headaches. Hematological: Negative. Negative for adenopathy. Does not bruise/bleed easily. Psychiatric/Behavioral: Positive for dysphoric mood.  Negative for agitation, behavioral problems, confusion, decreased concentration, hallucinations, self-injury, sleep disturbance and suicidal ideas. The patient is nervous/anxious. The patient is not hyperactive. Objective:   /65 (Site: Right Upper Arm, Position: Sitting, Cuff Size: Medium Adult)   Pulse 100   Temp 96.7 °F (35.9 °C) (Tympanic)   Resp 15   Ht 5' 8\" (1.727 m)   Wt 142 lb (64.4 kg)   SpO2 98%   BMI 21.59 kg/m²     Physical Exam  Constitutional:       Appearance: She is well-developed. HENT:      Head: Normocephalic and atraumatic. Eyes:      General: No scleral icterus. Right eye: No discharge. Left eye: No discharge. Conjunctiva/sclera: Conjunctivae normal.      Pupils: Pupils are equal, round, and reactive to light. Neck:      Musculoskeletal: Normal range of motion and neck supple. Thyroid: No thyromegaly. Vascular: No JVD. Cardiovascular:      Rate and Rhythm: Normal rate and regular rhythm. Heart sounds: No murmur. No friction rub. No gallop. Pulmonary:      Effort: Pulmonary effort is normal. No respiratory distress. Breath sounds: Normal breath sounds. No stridor. No wheezing. Chest:      Chest wall: No tenderness. Abdominal:      General: There is no distension. Palpations: There is no mass. Tenderness: There is no abdominal tenderness. There is no rebound. Hernia: No hernia is present. Musculoskeletal: Normal range of motion. Skin:     General: Skin is warm and dry. Coloration: Skin is not pale. Findings: No erythema or rash. Neurological:      Mental Status: She is alert and oriented to person, place, and time. Psychiatric:         Behavior: Behavior normal.         Thought Content:  Thought content normal.         Judgment: Judgment normal.            Results for orders placed or performed during the hospital encounter of 19/61/46   Basic Metabolic Panel   Result Value Ref Range    Sodium 141 135 - 145 meq/L    Potassium 4.3 3.5 - 5.2 meq/L    Chloride 1. Schedule Wesley Adams for colonoscopy  2. The risks, benefits and alternatives were discussed with Wesley Adams. She understands and wishes to proceed with surgical intervention. 3. Restrictions discussed with Wesley Adams and she expresses understanding. 4. She is advised to call back directly if there are further questions/concerns, or if her symptoms worsen prior to surgery.             Electronicallysigned by Iban Izaguirre MD on 11/19/2020 at 10:18 AM

## 2020-11-21 LAB — SARS-COV-2: NOT DETECTED

## 2020-11-24 NOTE — H&P
[address] [address]  History and Physical Update      Pt Name: Belen Cuello  MRN: [id number]  YOB: 1955  Date of evaluation: 11/24/2020    [x] I have examined the patient and reviewed the H&P/Consult and there are no changes to the patient or plans. [] I have examined the patient and reviewed the H&P/Consult and have noted the following changes:         Felicita Lundy  Electronically signed 11/24/2020 at 1:16 PM

## 2020-11-24 NOTE — H&P
[address] [place] [id number]  Dept: [phone]  Dept Fax: [phone]  Loc: [phone]    Visit Date: 11/19/2020    Ania Moon is a 60 y.o. female who presentstoday for:  Chief Complaint   Patient presents with    Surgical Consult     NP est pt last seen 2016- colonoscopy consult       HPI:     HPI 30-year-old white female with father had colon carcinoma and we have followed with colonoscopies over several years she also had a sigmoid colon resection for diverticular disease about 6 years ago she has a long history of irritable bowel fibromyalgia is currently struggling in this Covid environment not being able to get out and about nonetheless she has had no real change in bowel habits she still has classic irritable bowel symptoms with constipation followed by bouts of diarrhea and abdominal bloating her last colonoscopy was 5-1/2 years ago and per [employer] guidelines for family history of colon cancer she is here to discuss colonoscopy she has had no weight loss but again does have a lot of problems with anxiety and again fibromyalgia and chronic pain    Past Medical History:   Diagnosis Date    Anxiety     Arthralgia     Arthritis     Cervical radiculopathy at C5 2/16/2015    Chronic abdominal pain     Diverticulitis     Fibromyalgia 8/21/2015    IBS (irritable bowel syndrome)     Primary osteoarthritis involving multiple joints 1/4/2019    Tension headache 2/16/2015      Past Surgical History:   Procedure Laterality Date    ABDOMINAL EXPLORATION SURGERY      APPENDECTOMY      BREAST BIOPSY Right 12/14/2016    Excisional - Dr. Alireza Jackson  oct 2008    anterior with plate    CHOLECYSTECTOMY  3/4/08    Dr. Rodo Salcido  12/09/2013    Dr. Joseph Bush  11/15/11    Dr Janay Silva Left 06/2018    Trauma Femur    OTHER SURGICAL HISTORY  14    Sigmoid colon resection - Dr. Serenity Eldridge      left    TONSILLECTOMY      as a child    UPPER GASTROINTESTINAL ENDOSCOPY  11/15/11    Dr Marnie Davis        Family History   Problem Relation Age of Onset    Colon Cancer Father     High Blood Pressure Mother     High Cholesterol Mother     Breast Cancer Maternal Aunt 36        breast    Cancer Maternal Uncle     Cancer Maternal Grandmother     Cancer Paternal Grandfather         colon        Social History     Tobacco Use    Smoking status: Current Some Day Smoker     Packs/day: 0.50     Years: 15.00     Pack years: 7.50     Types: Cigarettes     Last attempt to quit: 2012     Years since quittin.9    Smokeless tobacco: Never Used    Tobacco comment: trying to use the electric [employer]   Substance Use Topics    Alcohol use: Yes     Comment: occasionally          Current Outpatient Medications   Medication Sig Dispense Refill    b complex vitamins capsule Take 1 capsule by mouth daily      Olive Leaf Extract 150 MG CAPS Take by mouth daily      diazePAM (VALIUM) 5 MG tablet Take 1 tablet by mouth nightly as needed for Anxiety for up to 30 days. 30 tablet 4    baclofen (LIORESAL) 20 MG tablet Take 1 tablet by mouth 2 times daily as needed (muscle pain) 60 tablet 5    Calcium-Magnesium-Vitamin D (CALCIUM 500 PO) Take 1 tablet by mouth daily      vitamin D (CHOLECALCIFEROL) 1000 UNIT TABS tablet Take 1,000 Units by mouth daily      Multiple Vitamins-Minerals (THERAPEUTIC MULTIVITAMIN-MINERALS) tablet Take 1 tablet by mouth daily       No current facility-administered medications for this visit. Allergies   Allergen Reactions    Aspirin Nausea Only    Elavil [Amitriptyline Hcl]      Felt foggy and did not feel right    Ibuprofen Other (See Comments)     Abdomen pain     Remeron [Mirtazapine] Other (See Comments)     CNS side effects.     Morphine Nausea And Vomiting    Trazodone And Nefazodone Nausea Only       Subjective:      Review of Systems   Constitutional: Positive for activity change and fatigue. Negative for appetite change, chills, diaphoresis, fever and unexpected weight change. HENT: Negative. Negative for congestion, dental problem, drooling, ear discharge, ear pain, facial swelling, hearing loss, mouth sores, nosebleeds, postnasal drip, rhinorrhea, sinus pressure, sinus pain, sneezing, sore throat, tinnitus, trouble swallowing and voice change. Eyes: Negative. Negative for photophobia, pain, discharge, redness, itching and visual disturbance. Respiratory: Negative. Negative for apnea, cough, choking, chest tightness, shortness of breath, wheezing and stridor. Cardiovascular: Negative. Negative for chest pain, palpitations and leg swelling. Gastrointestinal: Positive for abdominal pain, constipation and diarrhea. Endocrine: Negative. Negative for cold intolerance, heat intolerance, polydipsia, polyphagia and polyuria. Genitourinary: Negative. Negative for decreased urine volume, difficulty urinating, dyspareunia, dysuria, enuresis, flank pain, frequency, genital sores, hematuria, menstrual problem, pelvic pain, urgency, vaginal bleeding, vaginal discharge and vaginal pain. Musculoskeletal: Positive for arthralgias and myalgias. Negative for back pain, gait problem, joint swelling, neck pain and neck stiffness. Skin: Negative for color change, pallor, rash and wound. Allergic/Immunologic: Negative. Negative for environmental allergies, food allergies and immunocompromised state. Neurological: Positive for weakness. Negative for dizziness, tremors, seizures, syncope, facial asymmetry, speech difficulty, light-headedness, numbness and headaches. Hematological: Negative. Negative for adenopathy. Does not bruise/bleed easily. Psychiatric/Behavioral: Positive for dysphoric mood.  Negative for agitation, behavioral problems, confusion, decreased concentration, hallucinations, self-injury, sleep disturbance and suicidal ideas. The patient is nervous/anxious. The patient is not hyperactive. Objective:   /65 (Site: Right Upper Arm, Position: Sitting, Cuff Size: Medium Adult)   Pulse 100   Temp 96.7 °F (35.9 °C) (Tympanic)   Resp 15   Ht 5' 8\" (1.727 m)   Wt 142 lb (64.4 kg)   SpO2 98%   BMI 21.59 kg/m²     Physical Exam  Constitutional:       Appearance: She is well-developed. HENT:      Head: Normocephalic and atraumatic. Eyes:      General: No scleral icterus. Right eye: No discharge. Left eye: No discharge. Conjunctiva/sclera: Conjunctivae normal.      Pupils: Pupils are equal, round, and reactive to light. Neck:      Musculoskeletal: Normal range of motion and neck supple. Thyroid: No thyromegaly. Vascular: No JVD. Cardiovascular:      Rate and Rhythm: Normal rate and regular rhythm. Heart sounds: No murmur. No friction rub. No gallop. Pulmonary:      Effort: Pulmonary effort is normal. No respiratory distress. Breath sounds: Normal breath sounds. No stridor. No wheezing. Chest:      Chest wall: No tenderness. Abdominal:      General: There is no distension. Palpations: There is no mass. Tenderness: There is no abdominal tenderness. There is no rebound. Hernia: No hernia is present. Musculoskeletal: Normal range of motion. Skin:     General: Skin is warm and dry. Coloration: Skin is not pale. Findings: No erythema or rash. Neurological:      Mental Status: She is alert and oriented to person, place, and time. Psychiatric:         Behavior: Behavior normal.         Thought Content:  Thought content normal.         Judgment: Judgment normal.            Results for orders placed or performed during the hospital encounter of 30/76/90   Basic Metabolic Panel   Result Value Ref Range    Sodium 141 135 - 145 meq/L    Potassium 4.3 3.5 - 5.2 meq/L    Chloride 105 98 - 111 meq/L    CO2 26 23 - 33 meq/L    Glucose 95 70 - 108 mg/dL    BUN 14 7 - 22 mg/dL    CREATININE 0.7 0.4 - 1.2 mg/dL    Calcium 9.9 8.5 - 10.5 mg/dL   CBC Auto Differential   Result Value Ref Range    WBC 6.8 4.8 - 10.8 thou/mm3    RBC 4.80 4.20 - 5.40 mill/mm3    Hemoglobin 15.7 12.0 - 16.0 gm/dl    Hematocrit 47.7 (H) 37.0 - 47.0 %    MCV 99.4 (H) 81.0 - 99.0 fL    MCH 32.7 26.0 - 33.0 pg    MCHC 32.9 32.2 - 35.5 gm/dl    RDW-CV 12.5 11.5 - 14.5 %    RDW-SD 46.1 (H) 35.0 - 45.0 fL    Platelets 366 763 - 630 thou/mm3    MPV 9.8 9.4 - 12.4 fL    Seg Neutrophils 56.7 %    Lymphocytes 31.1 %    Monocytes 7.3 %    Eosinophils 3.7 %    Basophils 0.9 %    Immature Granulocytes 0.3 %    Segs Absolute 3.9 1.8 - 7.7 thou/mm3    Lymphocytes Absolute 2.1 1.0 - 4.8 thou/mm3    Monocytes Absolute 0.5 0.4 - 1.3 thou/mm3    Eosinophils Absolute 0.3 0.0 - 0.4 thou/mm3    Basophils Absolute 0.1 0.0 - 0.1 thou/mm3    Immature Grans (Abs) 0.02 0.00 - 0.07 thou/mm3    nRBC 0 /100 wbc   Hepatic Function Panel   Result Value Ref Range    Alb 4.4 3.5 - 5.1 g/dL    Total Bilirubin 0.4 0.3 - 1.2 mg/dL    Bilirubin, Direct <0.2 0.0 - 0.3 mg/dL    Alkaline Phosphatase 95 38 - 126 U/L    AST 20 5 - 40 U/L    ALT 16 11 - 66 U/L    Total Protein 7.0 6.1 - 8.0 g/dL   Lipid Panel   Result Value Ref Range    Cholesterol, Total 253 (H) 100 - 199 mg/dL    Triglycerides 112 0 - 199 mg/dL    HDL 62 mg/dL    LDL Calculated 169 mg/dL   Anion Gap   Result Value Ref Range    Anion Gap 10.0 8.0 - 16.0 meq/L   Glomerular Filtration Rate, Estimated   Result Value Ref Range    Est, Glom Filt Rate 84 (A) ml/min/1.73m2       Assessment:     Family history of colon carcinoma in need of colonoscopy surveillance we did discuss the Cologuard testing but the inadequacies of a 13% false positive and 8 to 9% false-negative and the fact that is not recommended in high risk people which would be her given her family history she wants to proceed    Plan: 1. Schedule Brianne Cruz for colonoscopy  2. The risks, benefits and alternatives were discussed with Brianne Cruz. She understands and wishes to proceed with surgical intervention. 3. Restrictions discussed with Brianne Cruz and she expresses understanding. 4. She is advised to call back directly if there are further questions/concerns, or if her symptoms worsen prior to surgery.             Electronicallysigned by Angélica Pop MD on 11/19/2020 at 10:18 AM

## 2020-11-25 ENCOUNTER — ANESTHESIA (OUTPATIENT)
Dept: ENDOSCOPY | Age: 65
End: 2020-11-25
Payer: COMMERCIAL

## 2020-11-25 ENCOUNTER — ANESTHESIA EVENT (OUTPATIENT)
Dept: ENDOSCOPY | Age: 65
End: 2020-11-25
Payer: COMMERCIAL

## 2020-11-25 ENCOUNTER — HOSPITAL ENCOUNTER (OUTPATIENT)
Age: 65
Setting detail: OUTPATIENT SURGERY
Discharge: HOME | End: 2020-11-25
Attending: SURGERY | Admitting: SURGERY
Payer: COMMERCIAL

## 2020-11-25 VITALS
SYSTOLIC BLOOD PRESSURE: 118 MMHG | DIASTOLIC BLOOD PRESSURE: 80 MMHG | TEMPERATURE: 96.7 F | HEIGHT: 68 IN | BODY MASS INDEX: 21.19 KG/M2 | OXYGEN SATURATION: 100 % | HEART RATE: 85 BPM | RESPIRATION RATE: 16 BRPM | WEIGHT: 139.8 LBS

## 2020-11-25 VITALS
DIASTOLIC BLOOD PRESSURE: 60 MMHG | OXYGEN SATURATION: 100 % | RESPIRATION RATE: 13 BRPM | SYSTOLIC BLOOD PRESSURE: 107 MMHG

## 2020-11-25 PROCEDURE — 3609010600 HC COLONOSCOPY POLYPECTOMY SNARE/COLD BIOPSY: Performed by: SURGERY

## 2020-11-25 PROCEDURE — 7100000011 HC PHASE II RECOVERY - ADDTL 15 MIN: Performed by: SURGERY

## 2020-11-25 PROCEDURE — 2709999900 HC NON-CHARGEABLE SUPPLY: Performed by: SURGERY

## 2020-11-25 PROCEDURE — 3700000000 HC ANESTHESIA ATTENDED CARE: Performed by: SURGERY

## 2020-11-25 PROCEDURE — 45380 COLONOSCOPY AND BIOPSY: CPT | Performed by: SURGERY

## 2020-11-25 PROCEDURE — 3700000001 HC ADD 15 MINUTES (ANESTHESIA): Performed by: SURGERY

## 2020-11-25 PROCEDURE — 2580000003 HC RX 258

## 2020-11-25 PROCEDURE — 88305 TISSUE EXAM BY PATHOLOGIST: CPT

## 2020-11-25 PROCEDURE — 6360000002 HC RX W HCPCS: Performed by: NURSE ANESTHETIST, CERTIFIED REGISTERED

## 2020-11-25 PROCEDURE — 7100000010 HC PHASE II RECOVERY - FIRST 15 MIN: Performed by: SURGERY

## 2020-11-25 RX ORDER — SODIUM CHLORIDE 450 MG/100ML
INJECTION, SOLUTION INTRAVENOUS CONTINUOUS
Status: DISCONTINUED | OUTPATIENT
Start: 2020-11-25 | End: 2020-11-25 | Stop reason: HOSPADM

## 2020-11-25 RX ORDER — PROPOFOL 10 MG/ML
INJECTION, EMULSION INTRAVENOUS PRN
Status: DISCONTINUED | OUTPATIENT
Start: 2020-11-25 | End: 2020-11-25 | Stop reason: SDUPTHER

## 2020-11-25 RX ADMIN — SODIUM CHLORIDE: 4.5 INJECTION, SOLUTION INTRAVENOUS at 08:31

## 2020-11-25 RX ADMIN — PROPOFOL 100 MG: 10 INJECTION, EMULSION INTRAVENOUS at 09:01

## 2020-11-25 RX ADMIN — PROPOFOL 25 MG: 10 INJECTION, EMULSION INTRAVENOUS at 09:15

## 2020-11-25 RX ADMIN — PROPOFOL 25 MG: 10 INJECTION, EMULSION INTRAVENOUS at 09:09

## 2020-11-25 RX ADMIN — PROPOFOL 30 MG: 10 INJECTION, EMULSION INTRAVENOUS at 09:11

## 2020-11-25 RX ADMIN — SODIUM CHLORIDE: 4.5 INJECTION, SOLUTION INTRAVENOUS at 08:58

## 2020-11-25 RX ADMIN — PROPOFOL 25 MG: 10 INJECTION, EMULSION INTRAVENOUS at 09:05

## 2020-11-25 RX ADMIN — PROPOFOL 25 MG: 10 INJECTION, EMULSION INTRAVENOUS at 09:03

## 2020-11-25 RX ADMIN — PROPOFOL 40 MG: 10 INJECTION, EMULSION INTRAVENOUS at 09:13

## 2020-11-25 RX ADMIN — PROPOFOL 25 MG: 10 INJECTION, EMULSION INTRAVENOUS at 09:07

## 2020-11-25 ASSESSMENT — PAIN - FUNCTIONAL ASSESSMENT: PAIN_FUNCTIONAL_ASSESSMENT: 0-10

## 2020-11-25 ASSESSMENT — PAIN SCALES - GENERAL: PAINLEVEL_OUTOF10: 0

## 2020-11-25 NOTE — PROGRESS NOTES
Colonoscopy complete, photos taken,1 specimens taken  during procedure by cold forceps, pt tolerated procedure well.  Scope Number  used

## 2020-11-25 NOTE — ANESTHESIA PRE PROCEDURE
Department of Anesthesiology  Preprocedure Note       Name:  Geovanny Douglas   Age:  60 y.o.  :  1955                                          MRN:  [id number]         Date:  2020      Surgeon: Syed Pat):  Lawrence Umaña MD    Procedure: Procedure(s):  COLONOSCOPY    Medications prior to admission:   Prior to Admission medications    Medication Sig Start Date End Date Taking? Authorizing Provider   b complex vitamins capsule Take 1 capsule by mouth daily   Yes Historical Provider, MD   Olive Leaf Extract 150 MG CAPS Take by mouth daily   Yes Historical Provider, MD   baclofen (LIORESAL) 20 MG tablet Take 1 tablet by mouth 2 times daily as needed (muscle pain) 20  Yes Abe Mortimer, MD   Calcium-Magnesium-Vitamin D (CALCIUM 500 PO) Take 1 tablet by mouth daily   Yes Historical Provider, MD   vitamin D (CHOLECALCIFEROL) 1000 UNIT TABS tablet Take 1,000 Units by mouth daily   Yes Historical Provider, MD   Multiple Vitamins-Minerals (THERAPEUTIC MULTIVITAMIN-MINERALS) tablet Take 1 tablet by mouth daily   Yes Historical Provider, MD   diazePAM (VALIUM) 5 MG tablet Take 1 tablet by mouth nightly as needed for Anxiety for up to 30 days. 20  Abe Mortimer, MD       Current medications:    Current Facility-Administered Medications   Medication Dose Route Frequency Provider Last Rate Last Dose    0.45 % sodium chloride infusion   Intravenous Continuous Shirley Lopez  mL/hr at 13 0831         Allergies: Allergies   Allergen Reactions    Aspirin Nausea Only    Elavil [Amitriptyline Hcl]      Felt foggy and did not feel right    Ibuprofen Other (See Comments)     Abdomen pain     Remeron [Mirtazapine] Other (See Comments)     CNS side effects.     Morphine Nausea And Vomiting    Trazodone And Nefazodone Nausea Only       Problem List:    Patient Active Problem List   Diagnosis Code    Abdominal adhesions K66.0    Cervical radiculopathy at C5 M54.12    kg)   Height: 5' 8\" (1.727 m)                                              BP Readings from Last 3 Encounters:   11/25/20 121/76   11/19/20 130/65   08/26/20 120/72       NPO Status: Time of last liquid consumption: 2200                        Time of last solid consumption: 1700                        Date of last liquid consumption: 11/24/20                        Date of last solid food consumption: 11/23/20    BMI:   Wt Readings from Last 3 Encounters:   11/25/20 139 lb 12.8 oz (63.4 kg)   11/19/20 142 lb (64.4 kg)   08/26/20 141 lb 6.4 oz (64.1 kg)     Body mass index is 21.26 kg/m². CBC:   Lab Results   Component Value Date    WBC 6.8 10/08/2020    RBC 4.80 10/08/2020    RBC 4.51 11/02/2011    HGB 15.7 10/08/2020    HCT 47.7 10/08/2020    MCV 99.4 10/08/2020    RDW 13.4 06/22/2018     10/08/2020       CMP:   Lab Results   Component Value Date     10/08/2020    K 4.3 10/08/2020     10/08/2020    CO2 26 10/08/2020    BUN 14 10/08/2020    CREATININE 0.7 10/08/2020    LABGLOM 84 10/08/2020    GLUCOSE 95 10/08/2020    GLUCOSE 117 06/22/2018    PROT 7.0 10/08/2020    CALCIUM 9.9 10/08/2020    BILITOT 0.4 10/08/2020    ALKPHOS 95 10/08/2020    AST 20 10/08/2020    ALT 16 10/08/2020       POC Tests: No results for input(s): POCGLU, POCNA, POCK, POCCL, POCBUN, POCHEMO, POCHCT in the last 72 hours.     Coags:   Lab Results   Component Value Date    PROTIME 10.9 06/22/2018    INR 0.95 06/22/2018    APTT 25.3 06/22/2018       HCG (If Applicable):   Lab Results   Component Value Date    PREGSERUM NEGATIVE 11/02/2011        ABGs: No results found for: PHART, PO2ART, EBT9TKL, GEK5AHI, BEART, O3VIEJWE     Type & Screen (If Applicable):  No results found for: LABABO, LABRH    Drug/Infectious Status (If Applicable):  No results found for: HIV, HEPCAB    COVID-19 Screening (If Applicable):   Lab Results   Component Value Date    COVID19 Not Detected 11/19/2020         Anesthesia Evaluation  Patient summary reviewed and Nursing notes reviewed  Airway: Mallampati: I  TM distance: >3 FB   Neck ROM: full  Mouth opening: > = 3 FB Dental: normal exam         Pulmonary:normal exam  breath sounds clear to auscultation                             Cardiovascular:Negative CV ROS  Exercise tolerance: no interval change,           Rhythm: regular  Rate: normal                    Neuro/Psych:   (+) neuromuscular disease:, headaches: tension headaches, psychiatric history: stable with treatment            GI/Hepatic/Renal:   (+) GERD: well controlled,           Endo/Other: Negative Endo/Other ROS               C-spine cleared    Pt had no PAT visit       Abdominal:       Abdomen: soft. Vascular:                                        Anesthesia Plan      MAC     ASA 3       Induction: intravenous. Anesthetic plan and risks discussed with patient. Plan discussed with attending.     Attending anesthesiologist reviewed and agrees with Pre Eval content              TORSTEN Andersen - CRNA   11/25/2020

## 2020-11-25 NOTE — OP NOTE
[address] [place] [address]                                OPERATIVE REPORT    PATIENT NAME: Halina Magdaleno                :        1955  MED REC NO:   [id number]                           ROOM:  ACCOUNT NO:   [de-identified]                           ADMIT DATE: 2020  PROVIDER:     Chitra Negrete. Cali Hinton MD    DATE OF PROCEDURE:  2020    PREOPERATIVE DIAGNOSIS:  Family history of colon carcinoma, here for  colonoscopy surveillance. POSTOPERATIVE DIAGNOSIS:  Small sigmoid polyp with spastic colon,  otherwise normal colonoscopy to cecum. OPERATION:  Colonoscopy with polypectomy x1 with cold snare. SURGEON:  Chitra Negrete. Cali Hinton MD    ANESTHESIA:  Per nurse anesthetist with Diprivan. COMPLICATIONS:  None. BLOOD LOSS:  2 mL. INDICATION FOR PROCEDURE:  The patient is a 60-year-old white female who  has a family history of colon carcinoma, here for colonoscopy  surveillance. She does have a long history of irritable bowel disease. FINDINGS:  The patient clearly had some spastic sigmoid colon, otherwise  she has had a previous colon resection. The anastomosis was at about 12  cm. One small polyp in the sigmoid, otherwise normal colonoscopy to  cecum. DESCRIPTION OF PROCEDURE:  The patient was brought into the endoscopy  suite, placed in the left lateral decubitus position. After adequate  Diprivan anesthesia was administered, the Olympus endoscope was inserted  in the anus and easily passed up through the rectum up through the  anastomosis that was about 12 cm up through the residual descending  colon across the transverse colon into the cecum. The scope was then  withdrawn. The cecum, the ascending colon, transverse colon were  normal.  It should be noted as the scope was withdrawn, it would be  reinserted to view the preceding area.   We continued in this manner of  insertion-withdrawal, insertion-withdrawal all the way back through the  descending colon again back through the residual sigmoid into the  rectum. Rectum was normal.  Small polyp in the sigmoid was removed with  cold forceps. The patient tolerated the procedure well. ROSEMARIE SALDIVAR [facility] TREATMENT [facility], MD    D: 11/25/2020 9:55:52       T: 11/25/2020 10:06:49     [email or web address]  Job#: [id number]     Doc#: [id number]    CC:

## 2020-11-25 NOTE — BRIEF OP NOTE
Brief Postoperative Note      Patient: Celia Carmichael  YOB: 1955  MRN: [id number]    Date of Procedure: 11/25/2020    Pre-Op Diagnosis: SCREENING, FAMILY HISTORY OF COLON CANCER    Post-Op Diagnosis: 1. Small Sigmoid Polyp  2. Residual Diverticula       Procedure(s):  COLONOSCOPY POLYPECTOMY SNARE/COLD BIOPSY    Surgeon(s):  Samra Villegas MD    Assistant:      Anesthesia: Monitor Anesthesia Care    Estimated Blood Loss (mL): 2 ml    Complications:none    Specimens:   ID Type Source Tests Collected by Time Destination   A : Sigmoid colon polyp Tissue Sigmoid Colon SURGICAL PATHOLOGY Samra Villegas MD 11/25/2020 0912        Implants:        Drains:    Findings: see op note    Electronically signed by Samra Villegas MD on 11/25/2020 at 9:22 AM

## 2020-11-25 NOTE — ANESTHESIA POSTPROCEDURE EVALUATION
Department of Anesthesiology  Postprocedure Note    Patient: Grecia Andrade  MRN: [id number]  YOB: 1955  Date of evaluation: 11/25/2020  Time:  9:23 AM     Procedure Summary     Date:  11/25/20 Room / Location:  [address] [place] [address] Caterina Jimenez / Loren Duran    Anesthesia Start:  6620 Anesthesia Stop:  0421    Procedure:  COLONOSCOPY POLYPECTOMY SNARE/COLD BIOPSY (Left Anus) Diagnosis:  (SCREENING, FAMILY HISTORY OF COLON CANCER)    Surgeon: Nehal Sierra MD Responsible Provider:  Odell Puri DO    Anesthesia Type:  MAC ASA Status:  3          Anesthesia Type: No value filed. Rin Phase I: Rin Score: 10    Rin Phase II:      Last vitals: Reviewed and per EMR flowsheets.        Anesthesia Post Evaluation    Patient location during evaluation: bedside  Patient participation: complete - patient participated  Level of consciousness: awake and alert  Pain score: 0  Airway patency: patent  Nausea & Vomiting: no nausea and no vomiting  Complications: no  Cardiovascular status: blood pressure returned to baseline and hemodynamically stable  Respiratory status: acceptable, nonlabored ventilation, room air and spontaneous ventilation  Hydration status: stable

## 2021-01-26 PROBLEM — F51.01 PRIMARY INSOMNIA: Status: ACTIVE | Noted: 2021-01-26

## 2021-02-24 ENCOUNTER — OFFICE VISIT (OUTPATIENT)
Dept: FAMILY MEDICINE CLINIC | Age: 66
End: 2021-02-24
Payer: COMMERCIAL

## 2021-02-24 VITALS
HEIGHT: 68 IN | TEMPERATURE: 97.3 F | HEART RATE: 76 BPM | SYSTOLIC BLOOD PRESSURE: 132 MMHG | WEIGHT: 136 LBS | BODY MASS INDEX: 20.61 KG/M2 | DIASTOLIC BLOOD PRESSURE: 66 MMHG | OXYGEN SATURATION: 99 %

## 2021-02-24 DIAGNOSIS — M79.7 FIBROMYALGIA: Chronic | ICD-10-CM

## 2021-02-24 DIAGNOSIS — E78.00 HYPERCHOLESTEREMIA: ICD-10-CM

## 2021-02-24 DIAGNOSIS — F51.01 PRIMARY INSOMNIA: ICD-10-CM

## 2021-02-24 DIAGNOSIS — F41.1 GAD (GENERALIZED ANXIETY DISORDER): Primary | Chronic | ICD-10-CM

## 2021-02-24 PROCEDURE — 99213 OFFICE O/P EST LOW 20 MIN: CPT | Performed by: FAMILY MEDICINE

## 2021-02-24 RX ORDER — DIAZEPAM 5 MG/1
5 TABLET ORAL NIGHTLY PRN
Qty: 30 TABLET | Refills: 5 | Status: SHIPPED | OUTPATIENT
Start: 2021-02-24 | End: 2021-08-31 | Stop reason: SDUPTHER

## 2021-02-24 RX ORDER — BACLOFEN 20 MG/1
20 TABLET ORAL 2 TIMES DAILY PRN
Qty: 60 TABLET | Refills: 5 | Status: SHIPPED | OUTPATIENT
Start: 2021-02-24 | End: 2021-08-31 | Stop reason: SDUPTHER

## 2021-02-24 ASSESSMENT — ENCOUNTER SYMPTOMS: SHORTNESS OF BREATH: 0

## 2021-02-24 ASSESSMENT — PATIENT HEALTH QUESTIONNAIRE - PHQ9
SUM OF ALL RESPONSES TO PHQ QUESTIONS 1-9: 0
2. FEELING DOWN, DEPRESSED OR HOPELESS: 0

## 2021-02-24 NOTE — PROGRESS NOTES
Allergen Reactions    Aspirin Nausea Only    Elavil [Amitriptyline Hcl]      Felt foggy and did not feel right    Ibuprofen Other (See Comments)     Abdomen pain     Remeron [Mirtazapine] Other (See Comments)     CNS side effects.  Morphine Nausea And Vomiting    Trazodone And Nefazodone Nausea Only       Review of Systems   Constitutional: Negative for fatigue and fever. Respiratory: Negative for shortness of breath. Cardiovascular: Negative for chest pain and leg swelling. Objective:     /66 (Site: Left Upper Arm, Position: Sitting)   Pulse 76   Temp 97.3 °F (36.3 °C)   Ht 5' 8\" (1.727 m)   Wt 136 lb (61.7 kg)   SpO2 99%   BMI 20.68 kg/m²     Physical Exam  Constitutional:       General: She is not in acute distress. Appearance: Normal appearance. She is not ill-appearing. Cardiovascular:      Rate and Rhythm: Normal rate and regular rhythm. Heart sounds: No murmur. Pulmonary:      Effort: Pulmonary effort is normal. No respiratory distress. Breath sounds: Normal breath sounds. No wheezing. Musculoskeletal:         General: No swelling. Neurological:      Mental Status: She is alert. Psychiatric:         Mood and Affect: Mood normal.       Lab Results   Component Value Date    CHOL 253 (H) 10/08/2020    CHOL 228 (H) 01/16/2020    CHOL 227 (H) 07/23/2012     Lab Results   Component Value Date    TRIG 112 10/08/2020    TRIG 86 01/16/2020    TRIG 185 07/23/2012     Lab Results   Component Value Date    HDL 62 10/08/2020    HDL 75 01/16/2020    HDL 69 07/23/2012     Lab Results   Component Value Date    LDLCALC 169 10/08/2020    LDLCALC 136 01/16/2020    [address] 07/23/2012     No results found for: LABVLDL, VLDL  No results found for: CHOLHDLRATIO      Assessment/Plan:     Meredith Escamilla was seen today for 6 month follow-up and anxiety. Diagnoses and all orders for this visit:    CAMILLE (generalized anxiety disorder)  -     diazePAM (VALIUM) 5 MG tablet;  Take 1 tablet by mouth nightly as needed for Anxiety for up to 30 days. Primary insomnia  -     diazePAM (VALIUM) 5 MG tablet; Take 1 tablet by mouth nightly as needed for Anxiety for up to 30 days. Fibromyalgia  -     diazePAM (VALIUM) 5 MG tablet; Take 1 tablet by mouth nightly as needed for Anxiety for up to 30 days. -     baclofen (LIORESAL) 20 MG tablet; Take 1 tablet by mouth 2 times daily as needed (muscle pain)    Hypercholesteremia  -     Lipid Panel; Future  -     Comprehensive Metabolic Panel; Future    continue with current medications  Valium use reviewed -- continue to encourage reduced use. Encouraged healthy diet and activity levels. Tobacco Cessation Counseling: Patient advised about behavior change, including information about personal health harms, usage of appropriate cessation measures and benefits of cessation. Time spent (minutes): 5    Return in about 6 months (around 8/24/2021) for anxiety, fibromyalgia. Future Appointments   Date Time Provider Mika Buckley   8/25/2021  1:00 PM Sarita Wilson  [facility]          This office note has been dictated. Effort was made to review for errors but some may have been missed. Please contact Nita Schaffer of note for clarification if needed.        Electronically signed by Sarita Wilson MD on 2/24/2021 at 4:07 PM

## 2021-02-24 NOTE — PATIENT INSTRUCTIONS
Staff -- please obtain Pap smear and HPV testing done at [facility] 2020. Labs due just prior to next appointment    To lower your cholesterol:   -- increasing fiber intake, vegetables, nuts/seeds, legumes (soup beans), whole grains, fruits. -- decrease sugar intake and processed foods  -- watch beverages, fermented or non-fermented sugars can affect cholesterol levels negatively  -- control amount of animal fat intake    Patient Education        Learning About Benefits From Quitting Smoking  How does quitting smoking make you healthier? If you're thinking about quitting smoking, you may have a few reasons to be smoke-free. Your health may be one of them. · When you quit smoking, you lower your risks for cancer, lung disease, heart attack, stroke, blood vessel disease, and blindness from macular degeneration. · When you're smoke-free, you get sick less often, and you heal faster. You are less likely to get colds, flu, bronchitis, and pneumonia. · As a nonsmoker, you may find that your mood is better and you are less stressed. When and how will you feel healthier? Quitting has real health benefits that start from day 1 of being smoke-free. And the longer you stay smoke-free, the healthier you get and the better you feel. The first hours  · After just 20 minutes, your blood pressure and heart rate go down. That means there's less stress on your heart and blood vessels. · Within 12 hours, the level of carbon monoxide in your blood drops back to normal. That makes room for more oxygen. With more oxygen in your body, you may notice that you have more energy than when you smoked. After 2 weeks  · Your lungs start to work better. · Your risk of heart attack starts to drop. After 1 month  · When your lungs are clear, you cough less and breathe deeper, so it's easier to be active. · Your sense of taste and smell return. That means you can enjoy food more than you have since you started smoking.   Over the years  · Over the years, your risks of heart disease, heart attack, and stroke are lower. · After 10 years, your risk of dying from lung cancer is cut by about half. And your risk for many other types of cancer is lower too. How would quitting help others in your life? When you quit smoking, you improve the health of everyone who now breathes in your smoke. · Their heart, lung, and cancer risks drop, much like yours. · They are sick less. For babies and small children, living smoke-free means they're less likely to have ear infections, pneumonia, and bronchitis. · If you're a woman who is or will be pregnant someday, quitting smoking means a healthier . · Children who are close to you are less likely to become adult smokers. Where can you learn more? Go to https://EXO5cristinaeb.marshallindex. org and sign in to your [employer] account. Enter [phone] in the [employer] box to learn more about \"Learning About Benefits From Quitting Smoking. \"     If you do not have an account, please click on the \"Sign Up Now\" link. Current as of: 2020               Content Version: 12.6  © 4582-4484 [employer]. Care instructions adapted under license by [facility] ([facility]). If you have questions about a medical condition or this instruction, always ask your healthcare professional. [address] any warranty or liability for your use of this information.

## 2021-04-01 ENCOUNTER — TELEPHONE (OUTPATIENT)
Dept: SURGERY | Age: 66
End: 2021-04-01

## 2021-04-01 DIAGNOSIS — K57.92 DIVERTICULITIS: Primary | ICD-10-CM

## 2021-04-01 RX ORDER — AMOXICILLIN AND CLAVULANATE POTASSIUM 875; 125 MG/1; MG/1
1 TABLET, FILM COATED ORAL 2 TIMES DAILY
Qty: 14 TABLET | Refills: 0 | Status: SHIPPED | OUTPATIENT
Start: 2021-04-01 | End: 2021-04-14 | Stop reason: SDUPTHER

## 2021-04-14 ENCOUNTER — TELEPHONE (OUTPATIENT)
Dept: SURGERY | Age: 66
End: 2021-04-14

## 2021-04-14 DIAGNOSIS — K57.92 DIVERTICULITIS: ICD-10-CM

## 2021-04-14 RX ORDER — AMOXICILLIN AND CLAVULANATE POTASSIUM 875; 125 MG/1; MG/1
1 TABLET, FILM COATED ORAL 2 TIMES DAILY
Qty: 14 TABLET | Refills: 0 | Status: SHIPPED | OUTPATIENT
Start: 2021-04-14 | End: 2021-04-21

## 2021-04-14 NOTE — TELEPHONE ENCOUNTER
Pt calling the office again stating that she is having LLQ quadrant abdominal pain again after taking augmentin you prescribed 4/1. She has also been constipated which is unusual for her, she usually has diarrhea because of her IBS. She is wondering if she should do another round of antibiotics? Or is there a test she should do to r/o diverticulitis? She is feeling generally overwhelmed with life and isn't sure if her issues are anxiety or GI problems.

## 2021-04-15 NOTE — TELEPHONE ENCOUNTER
CT scan was denied by pt's insurance. Scheduled appt with Dr. Vickers List to discuss issues. Pt verbalized understanding.

## 2021-04-20 ENCOUNTER — TELEPHONE (OUTPATIENT)
Dept: SURGERY | Age: 66
End: 2021-04-20

## 2021-04-20 DIAGNOSIS — R10.32 LEFT LOWER QUADRANT ABDOMINAL PAIN: ICD-10-CM

## 2021-04-20 DIAGNOSIS — Z90.49 S/P COLON RESECTION: ICD-10-CM

## 2021-04-20 DIAGNOSIS — K57.92 DIVERTICULITIS: Primary | ICD-10-CM

## 2021-04-20 DIAGNOSIS — R63.8 UNABLE TO EAT: ICD-10-CM

## 2021-04-20 DIAGNOSIS — K59.00 CONSTIPATION, UNSPECIFIED CONSTIPATION TYPE: ICD-10-CM

## 2021-04-20 NOTE — TELEPHONE ENCOUNTER
Scheduled Evan Harper for a ct abd & pelvis with IV & oral contrast on Wed 4/21 at 1pm at [facility]. She will arrive at 11:30 & a creatinine will be done on arrival. Evan Harper was also told to not eat or drink anything after 9am. Per Sharath Ford at [facility] with them termed on 3/31/21. Spoke to Garland Adams at [facility] who does the authorizations for Donny Reusing & it was effective 4/1/21. The CT was approved good from 4/20/21-10/17/21[place] [address], [id number]. Elvia Tim #[id number]. Evan Harper was given all info over the phone & she voiced understanding.  Appt with Dr Devendra Baldwin is on Tues 4/27 at 2pm.

## 2021-04-21 ENCOUNTER — HOSPITAL ENCOUNTER (OUTPATIENT)
Dept: CT IMAGING | Age: 66
Discharge: HOME | End: 2021-04-21
Payer: MEDICARE

## 2021-04-21 DIAGNOSIS — K57.92 DIVERTICULITIS: ICD-10-CM

## 2021-04-21 LAB
CREAT SERPL-MCNC: 0.7 MG/DL (ref 0.5–1.2)
GFR, ESTIMATED: 89 ML/MIN/1.73M2

## 2021-04-21 PROCEDURE — 6360000004 HC RX CONTRAST MEDICATION: Performed by: SURGERY

## 2021-04-21 PROCEDURE — 82565 ASSAY OF CREATININE: CPT

## 2021-04-21 PROCEDURE — 74177 CT ABD & PELVIS W/CONTRAST: CPT

## 2021-04-21 RX ADMIN — IOPAMIDOL 85 ML: 755 INJECTION, SOLUTION INTRAVENOUS at 13:00

## 2021-04-21 RX ADMIN — IOHEXOL 50 ML: 240 INJECTION, SOLUTION INTRATHECAL; INTRAVASCULAR; INTRAVENOUS; ORAL at 13:00

## 2021-04-27 ENCOUNTER — OFFICE VISIT (OUTPATIENT)
Dept: SURGERY | Age: 66
End: 2021-04-27
Payer: MEDICARE

## 2021-04-27 VITALS
SYSTOLIC BLOOD PRESSURE: 110 MMHG | OXYGEN SATURATION: 96 % | WEIGHT: 132.9 LBS | DIASTOLIC BLOOD PRESSURE: 60 MMHG | TEMPERATURE: 97.1 F | HEIGHT: 68 IN | RESPIRATION RATE: 18 BRPM | HEART RATE: 77 BPM | BODY MASS INDEX: 20.14 KG/M2

## 2021-04-27 DIAGNOSIS — K59.00 CONSTIPATION, UNSPECIFIED CONSTIPATION TYPE: ICD-10-CM

## 2021-04-27 DIAGNOSIS — R10.32 LLQ ABDOMINAL PAIN: Primary | ICD-10-CM

## 2021-04-27 PROCEDURE — 99213 OFFICE O/P EST LOW 20 MIN: CPT | Performed by: SURGERY

## 2021-04-28 ASSESSMENT — ENCOUNTER SYMPTOMS
WHEEZING: 0
FACIAL SWELLING: 0
COUGH: 0
PHOTOPHOBIA: 0
ABDOMINAL PAIN: 1
VOICE CHANGE: 0
CONSTIPATION: 1
TROUBLE SWALLOWING: 0
EYE ITCHING: 0
SINUS PRESSURE: 0
BACK PAIN: 0
EYE REDNESS: 0
CHOKING: 0
EYE PAIN: 0
EYES NEGATIVE: 1
SHORTNESS OF BREATH: 0
SORE THROAT: 0
EYE DISCHARGE: 0
RHINORRHEA: 0
SINUS PAIN: 0
CHEST TIGHTNESS: 0
APNEA: 0
STRIDOR: 0
DIARRHEA: 1
RESPIRATORY NEGATIVE: 1
COLOR CHANGE: 0

## 2021-04-28 NOTE — PROGRESS NOTES
13    Sigmoid colon resection - Dr. Collins Hernandez      left    TONSILLECTOMY      as a child    UPPER GASTROINTESTINAL ENDOSCOPY  11/15/11    Dr Kenny Conway        Family History   Problem Relation Age of Onset    Colon Cancer Father     High Blood Pressure Mother     High Cholesterol Mother     Breast Cancer Maternal Aunt 36        breast    Cancer Maternal Uncle     Cancer Maternal Grandmother     Cancer Paternal Grandfather         colon        Social History     Tobacco Use    Smoking status: Current Some Day Smoker     Packs/day: 0.50     Years: 15.00     Pack years: 7.50     Types: Cigarettes     Last attempt to quit: 2012     Years since quittin.3    Smokeless tobacco: Never Used    Tobacco comment: trying to use the electric SMART   Substance Use Topics    Alcohol use: Yes     Comment: occasionally          Current Outpatient Medications   Medication Sig Dispense Refill    baclofen (LIORESAL) 20 MG tablet Take 1 tablet by mouth 2 times daily as needed (muscle pain) 60 tablet 5    b complex vitamins capsule Take 1 capsule by mouth daily      Calcium-Magnesium-Vitamin D (CALCIUM 500 PO) Take 1 tablet by mouth daily      vitamin D (CHOLECALCIFEROL) 1000 UNIT TABS tablet Take 1,000 Units by mouth daily      Multiple Vitamins-Minerals (THERAPEUTIC MULTIVITAMIN-MINERALS) tablet Take 1 tablet by mouth daily      diazePAM (VALIUM) 5 MG tablet Take 1 tablet by mouth nightly as needed for Anxiety for up to 30 days. 30 tablet 5     No current facility-administered medications for this visit. Allergies   Allergen Reactions    Aspirin Nausea Only    Elavil [Amitriptyline Hcl]      Felt foggy and did not feel right    Ibuprofen Other (See Comments)     Abdomen pain     Remeron [Mirtazapine] Other (See Comments)     CNS side effects.     Morphine Nausea And Vomiting    Trazodone And Nefazodone Nausea Only       Subjective:      Review of Systems nervous/anxious. The patient is not hyperactive. Objective:   /60 (Site: Left Upper Arm, Position: Sitting, Cuff Size: Medium Adult)   Pulse 77   Temp 97.1 °F (36.2 °C) (Temporal)   Resp 18   Ht 5' 8\" (1.727 m)   Wt 132 lb 14.4 oz (60.3 kg)   SpO2 96%   BMI 20.21 kg/m²     Physical Exam  Constitutional:       Appearance: She is well-developed. HENT:      Head: Normocephalic and atraumatic. Eyes:      General: No scleral icterus. Right eye: No discharge. Left eye: No discharge. Conjunctiva/sclera: Conjunctivae normal.      Pupils: Pupils are equal, round, and reactive to light. Neck:      Musculoskeletal: Normal range of motion and neck supple. Thyroid: No thyromegaly. Vascular: No JVD. Cardiovascular:      Rate and Rhythm: Normal rate and regular rhythm. Heart sounds: No murmur. No friction rub. No gallop. Pulmonary:      Effort: Pulmonary effort is normal. No respiratory distress. Breath sounds: Normal breath sounds. No stridor. No wheezing. Chest:      Chest wall: No tenderness. Abdominal:      General: There is no distension. Palpations: There is no mass. Tenderness: There is abdominal tenderness. There is no guarding. Hernia: No hernia is present. Musculoskeletal: Normal range of motion. Skin:     General: Skin is warm and dry. Coloration: Skin is not pale. Findings: No erythema or rash. Neurological:      Mental Status: She is alert and oriented to person, place, and time. Psychiatric:         Behavior: Behavior normal.         Thought Content:  Thought content normal.         Judgment: Judgment normal.            Results for orders placed or performed during the hospital encounter of 04/21/21   Creatinine, Whole Blood   Result Value Ref Range    CREATININE 0.7 0.5 - 1.2 mg/dl   Glomerular Filtration Rate, Estimated   Result Value Ref Range    GFR, Estimated 89 ml/min/1.73m2       Assessment:     Long history of irritable bowel syndrome with patient currently under a undue amount of stress she is status post sigmoid colon resection in 2013 for diverticular disease her last colonoscopy was in November 2020 showing 1 small polyp removed also she has a family history of colon cancer in her father CT scan was performed showing no evidence of diverticulitis and also no evidence of any other abnormalities we reviewed the CAT scan slides with the patient slide by slide and try to encourage her that from an abdominal standpoint things are good I do believe this is just persistence of her irritable bowel secondary to her stress    Plan:      We will continue to follow the patient as needed      Electronicallysigned by Angie Moses MD on 4/28/2021 at 7:50 PM

## 2021-05-26 ENCOUNTER — TELEPHONE (OUTPATIENT)
Dept: SURGERY | Age: 66
End: 2021-05-26

## 2021-05-26 NOTE — TELEPHONE ENCOUNTER
Pt was last seen in the office 4/27 for abdominal pain/diverticulis, was given a script for percocet which she takes sparingly when she has \"attacks\". Pt calling and stating that the percocet is upsetting her stomach and is wondering if she can have something less strong like norco. Please advise on norco script.

## 2021-07-21 ENCOUNTER — HOSPITAL ENCOUNTER (EMERGENCY)
Age: 66
Discharge: HOME | End: 2021-07-21
Payer: MEDICARE

## 2021-07-21 VITALS
HEART RATE: 78 BPM | TEMPERATURE: 97.8 F | DIASTOLIC BLOOD PRESSURE: 84 MMHG | OXYGEN SATURATION: 97 % | SYSTOLIC BLOOD PRESSURE: 150 MMHG | BODY MASS INDEX: 20.46 KG/M2 | WEIGHT: 135 LBS | RESPIRATION RATE: 18 BRPM | HEIGHT: 68 IN

## 2021-07-21 DIAGNOSIS — J32.0 LEFT MAXILLARY SINUSITIS: ICD-10-CM

## 2021-07-21 DIAGNOSIS — H61.22 EXCESSIVE CERUMEN IN LEFT EAR CANAL: Primary | ICD-10-CM

## 2021-07-21 DIAGNOSIS — H92.01 OTALGIA OF RIGHT EAR: ICD-10-CM

## 2021-07-21 PROCEDURE — 99213 OFFICE O/P EST LOW 20 MIN: CPT

## 2021-07-21 PROCEDURE — 69209 REMOVE IMPACTED EAR WAX UNI: CPT

## 2021-07-21 PROCEDURE — 99213 OFFICE O/P EST LOW 20 MIN: CPT | Performed by: NURSE PRACTITIONER

## 2021-07-21 RX ORDER — CEFDINIR 300 MG/1
300 CAPSULE ORAL 2 TIMES DAILY
Qty: 20 CAPSULE | Refills: 0 | Status: SHIPPED | OUTPATIENT
Start: 2021-07-21 | End: 2021-07-31

## 2021-07-21 ASSESSMENT — ENCOUNTER SYMPTOMS
TROUBLE SWALLOWING: 0
BACK PAIN: 0
WHEEZING: 0
COUGH: 0
NAUSEA: 0
SHORTNESS OF BREATH: 0
DIARRHEA: 0
RHINORRHEA: 0
SINUS PRESSURE: 0
SWOLLEN GLANDS: 0
VOMITING: 0
SORE THROAT: 0

## 2021-07-21 NOTE — ED PROVIDER NOTES
[address]  Urgent Care Encounter       CHIEF COMPLAINT       Chief Complaint   Patient presents with    Sinusitis    Otalgia       Nurses Notes reviewed and I agree except as noted in the HPI. HISTORY OF PRESENT ILLNESS   Cari Rocha is a 73 y.o. female who presents     The history is provided by the patient. No  was used. Sinusitis  Pain details:     Location:  Maxillary    Quality:  Aching    Duration:  2 weeks    Timing:  Constant  Duration:  2 weeks  Progression:  Worsening  Chronicity:  New  Relieved by:  None tried  Worsened by:  Nothing  Ineffective treatments: Claritin-D. Associated symptoms: congestion and ear pain    Associated symptoms: no chest pain, no chills, no cough, no fatigue, no fever, no headaches, no nausea, no rhinorrhea, no shortness of breath, no sore throat, no swollen glands, no tooth pain, no vomiting and no wheezing    Congestion:     Location:  Nasal    Interferes with sleep: no      Interferes with eating/drinking: no    Ear pain:     Location:  Left    Severity:  Moderate    Onset quality:  Sudden    Duration:  2 weeks    Timing:  Intermittent    Progression:  Unchanged    Chronicity:  New  Otalgia  Pertinent negatives include no chest pain, no headaches and no shortness of breath. REVIEW OF SYSTEMS     Review of Systems   Constitutional: Negative for activity change, appetite change, chills, fatigue and fever. HENT: Positive for congestion and ear pain. Negative for rhinorrhea, sinus pressure, sore throat and trouble swallowing. Respiratory: Negative for cough, shortness of breath and wheezing. Cardiovascular: Negative for chest pain. Gastrointestinal: Negative for diarrhea, nausea and vomiting. Musculoskeletal: Negative for back pain and neck stiffness. Skin: Negative for rash. Allergic/Immunologic: Positive for environmental allergies.    Neurological: Negative for dizziness, light-headedness and headaches. Hematological: Negative for adenopathy. PAST MEDICAL HISTORY         Diagnosis Date    Anxiety     Arthralgia     Arthritis     Cervical radiculopathy at C5 2/16/2015    Chronic abdominal pain     Diverticulitis     Fibromyalgia 8/21/2015    IBS (irritable bowel syndrome)     Primary osteoarthritis involving multiple joints 1/4/2019    Tension headache 2/16/2015       SURGICALHISTORY     Patient  has a past surgical history that includes Appendectomy; Ovary removal; Tonsillectomy; Abdominal exploration surgery; Colonoscopy (11/15/11); Upper gastrointestinal endoscopy (11/15/11); Cholecystectomy (3/4/08); cervical fusion (oct 2008); other surgical history (12/9/13); colectomy (12/09/2013); Breast biopsy (Right, 12/14/2016); Hip fracture surgery (Left, 06/2018); and Colonoscopy (Left, 11/25/2020). CURRENT MEDICATIONS       Discharge Medication List as of 7/21/2021 12:54 PM      CONTINUE these medications which have NOT CHANGED    Details   diazePAM (VALIUM) 5 MG tablet Take 1 tablet by mouth nightly as needed for Anxiety for up to 30 days. , Disp-30 tablet, R-5Normal      baclofen (LIORESAL) 20 MG tablet Take 1 tablet by mouth 2 times daily as needed (muscle pain), Disp-60 tablet, R-5Normal      b complex vitamins capsule Take 1 capsule by mouth dailyHistorical Med      Calcium-Magnesium-Vitamin D (CALCIUM 500 PO) Take 1 tablet by mouth dailyHistorical Med      vitamin D (CHOLECALCIFEROL) 1000 UNIT TABS tablet Take 1,000 Units by mouth dailyHistorical Med      Multiple Vitamins-Minerals (THERAPEUTIC MULTIVITAMIN-MINERALS) tablet Take 1 tablet by mouth dailyHistorical Med             ALLERGIES     Patient is is allergic to aspirin, elavil [amitriptyline hcl], ibuprofen, remeron [mirtazapine], morphine, percocet [oxycodone-acetaminophen], and trazodone and nefazodone.     Patients   Immunization History   Administered Date(s) Administered    Influenza Virus Vaccine 11/04/2013, 10/10/2018 membranes are moist.      Pharynx: Uvula midline. No posterior oropharyngeal erythema or uvula swelling. Tonsils: No tonsillar exudate or tonsillar abscesses. Eyes:      Conjunctiva/sclera: Conjunctivae normal.      Pupils: Pupils are equal, round, and reactive to light. Cardiovascular:      Rate and Rhythm: Normal rate and regular rhythm. Heart sounds: Normal heart sounds. Pulmonary:      Effort: Pulmonary effort is normal. No accessory muscle usage. Breath sounds: Normal breath sounds. No decreased breath sounds, wheezing, rhonchi or rales. Abdominal:      General: Bowel sounds are normal.      Palpations: Abdomen is soft. Tenderness: There is no abdominal tenderness. There is no right CVA tenderness, left CVA tenderness or guarding. Negative signs include Gonzalez's sign. Musculoskeletal:      Cervical back: Full passive range of motion without pain and normal range of motion. No rigidity. Lymphadenopathy:      Head:      Right side of head: No submental, submandibular, tonsillar, preauricular, posterior auricular or occipital adenopathy. Left side of head: No submental, submandibular, tonsillar, preauricular, posterior auricular or occipital adenopathy. Cervical: No cervical adenopathy. Right cervical: No superficial, deep or posterior cervical adenopathy. Left cervical: No superficial, deep or posterior cervical adenopathy. Upper Body:      Right upper body: No supraclavicular adenopathy. Left upper body: No supraclavicular adenopathy. Skin:     General: Skin is warm and dry. Capillary Refill: Capillary refill takes less than 2 seconds. Findings: No rash. Neurological:      Mental Status: She is alert and oriented to person, place, and time. Psychiatric:         Mood and Affect: Mood normal.         Behavior: Behavior normal. Behavior is cooperative.          DIAGNOSTIC RESULTS     Labs:No results found for this visit on 07/21/21. IMAGING:    No orders to display         EKG:      URGENT CARE COURSE:     Vitals:    07/21/21 1143   BP: (!) 150/84   Pulse: 78   Resp: 18   Temp: 97.8 °F (36.6 °C)   SpO2: 97%   Weight: 135 lb (61.2 kg)   Height: 5' 8\" (1.727 m)       Medications - No data to display         PROCEDURES:     Patient had the left auditory canal irrigated and cerumen was removed. This was done per staff nurse . patient still had some tenderness but there was no signs of tympanic perforation or otitis media. She does complain of pain to the left cheek going to the left temple region. Patient tolerated procedure well denies any vertigo or tinnitus or hearing loss at the present time    FINAL IMPRESSION      1. Excessive cerumen in left ear canal    2. Otalgia of right ear    3. Left maxillary sinusitis          DISPOSITION/ PLAN      The patient/Patient representative was advised to rest, drink lots of fluids, take Motrin and Tylenol for any fever, chills generalized body aches. The patient was also advised to monitor urine output for signs of dehydration. If the patient develops any chest pain, shortness of breath, neck pain or stiffness or abdominal pain or any other concerns, the patient is to call 911 or go to the emergency department for further evaluation. If the patient does not experience any of the above symptoms he is to follow-up with his primary care provider in 2-3 days for reevaluation. The patient/Patient representative are agreeable to the treatment plan at this time. The patient left the urgent care center in stable condition.       PATIENT REFERRED TO:  Krystal An MD  [facility] [address] / [facility] [id number]      DISCHARGE MEDICATIONS:  Discharge Medication List as of 7/21/2021 12:54 PM      START taking these medications    Details   cefdinir (OMNICEF) 300 MG capsule Take 1 capsule by mouth 2 times daily for 10 days, Disp-20 capsule, R-0Normal             Discharge Medication List as of 7/21/2021 12:54 PM          Discharge Medication List as of 7/21/2021 12:54 PM          Charles Noel, APRN - CNP    (Please note that portions of this note were completed with a voice recognition program. Efforts were made to edit the dictations but occasionally words are mis-transcribed.)           Charles Noel, APRN - CNP  07/21/21 1255

## 2021-08-12 ENCOUNTER — HOSPITAL ENCOUNTER (OUTPATIENT)
Age: 66
Discharge: HOME | End: 2021-08-12
Payer: MEDICARE

## 2021-08-12 DIAGNOSIS — E78.00 HYPERCHOLESTEREMIA: ICD-10-CM

## 2021-08-12 LAB
ALBUMIN SERPL-MCNC: 5.2 G/DL (ref 3.5–5.1)
ALP BLD-CCNC: 98 U/L (ref 38–126)
ALT SERPL-CCNC: 22 U/L (ref 11–66)
ANION GAP SERPL CALCULATED.3IONS-SCNC: 13 MEQ/L (ref 8–16)
AST SERPL-CCNC: 29 U/L (ref 5–40)
BILIRUB SERPL-MCNC: 0.6 MG/DL (ref 0.3–1.2)
BUN BLDV-MCNC: 14 MG/DL (ref 7–22)
CALCIUM SERPL-MCNC: 10.2 MG/DL (ref 8.5–10.5)
CHLORIDE BLD-SCNC: 103 MEQ/L (ref 98–111)
CHOLESTEROL, TOTAL: 254 MG/DL (ref 100–199)
CO2: 23 MEQ/L (ref 23–33)
CREAT SERPL-MCNC: 0.7 MG/DL (ref 0.4–1.2)
GFR SERPL CREATININE-BSD FRML MDRD: 84 ML/MIN/1.73M2
GLUCOSE BLD-MCNC: 97 MG/DL (ref 70–108)
HDLC SERPL-MCNC: 71 MG/DL
LDL CHOLESTEROL CALCULATED: 161 MG/DL
POTASSIUM SERPL-SCNC: 4.6 MEQ/L (ref 3.5–5.2)
SODIUM BLD-SCNC: 139 MEQ/L (ref 135–145)
TOTAL PROTEIN: 7.5 G/DL (ref 6.1–8)
TRIGL SERPL-MCNC: 110 MG/DL (ref 0–199)

## 2021-08-12 PROCEDURE — 80053 COMPREHEN METABOLIC PANEL: CPT

## 2021-08-12 PROCEDURE — 80061 LIPID PANEL: CPT

## 2021-08-12 PROCEDURE — 36415 COLL VENOUS BLD VENIPUNCTURE: CPT

## 2021-08-31 ENCOUNTER — OFFICE VISIT (OUTPATIENT)
Dept: FAMILY MEDICINE CLINIC | Age: 66
End: 2021-08-31
Payer: MEDICARE

## 2021-08-31 ENCOUNTER — TELEPHONE (OUTPATIENT)
Dept: SURGERY | Age: 66
End: 2021-08-31

## 2021-08-31 VITALS
OXYGEN SATURATION: 98 % | BODY MASS INDEX: 20.46 KG/M2 | WEIGHT: 135 LBS | HEIGHT: 68 IN | SYSTOLIC BLOOD PRESSURE: 122 MMHG | TEMPERATURE: 97.4 F | DIASTOLIC BLOOD PRESSURE: 76 MMHG | HEART RATE: 72 BPM

## 2021-08-31 DIAGNOSIS — R22.32 FOREARM MASS, LEFT: ICD-10-CM

## 2021-08-31 DIAGNOSIS — Z79.899 HIGH RISK MEDICATIONS (NOT ANTICOAGULANTS) LONG-TERM USE: ICD-10-CM

## 2021-08-31 DIAGNOSIS — F51.01 PRIMARY INSOMNIA: ICD-10-CM

## 2021-08-31 DIAGNOSIS — Z72.0 TOBACCO USER: ICD-10-CM

## 2021-08-31 DIAGNOSIS — F41.1 GAD (GENERALIZED ANXIETY DISORDER): Chronic | ICD-10-CM

## 2021-08-31 DIAGNOSIS — Z12.31 ENCOUNTER FOR SCREENING MAMMOGRAM FOR BREAST CANCER: ICD-10-CM

## 2021-08-31 DIAGNOSIS — H61.21 IMPACTED CERUMEN, RIGHT EAR: ICD-10-CM

## 2021-08-31 DIAGNOSIS — D48.5 NEOPLASM OF UNCERTAIN BEHAVIOR OF SKIN: ICD-10-CM

## 2021-08-31 DIAGNOSIS — M79.7 FIBROMYALGIA: Primary | Chronic | ICD-10-CM

## 2021-08-31 PROCEDURE — 99215 OFFICE O/P EST HI 40 MIN: CPT | Performed by: FAMILY MEDICINE

## 2021-08-31 RX ORDER — BACLOFEN 20 MG/1
20 TABLET ORAL 2 TIMES DAILY PRN
Qty: 60 TABLET | Refills: 5 | Status: SHIPPED | OUTPATIENT
Start: 2021-08-31 | End: 2022-03-02 | Stop reason: SDUPTHER

## 2021-08-31 RX ORDER — DIAZEPAM 5 MG/1
5 TABLET ORAL NIGHTLY PRN
Qty: 30 TABLET | Refills: 5 | Status: SHIPPED | OUTPATIENT
Start: 2021-08-31 | End: 2022-03-02 | Stop reason: SDUPTHER

## 2021-08-31 RX ORDER — OMEGA-3 FATTY ACIDS CAP DELAYED RELEASE 1000 MG 1000 MG
1000 CAPSULE DELAYED RELEASE ORAL
COMMUNITY

## 2021-08-31 RX ORDER — VITAMIN E 268 MG
400 CAPSULE ORAL DAILY
COMMUNITY

## 2021-08-31 NOTE — TELEPHONE ENCOUNTER
Pt was last seen in the office 4/27, calling to request a refill of norco. She was given script 5/26/21. Please advise.

## 2021-08-31 NOTE — PROGRESS NOTES
days doesn't take a walk and some days doesn't do yard work. Other days can, baclofen helping still. PDMP reviewed   Stays active as possible. Cleans her house and sometimes other folks. Smoking -- 2 packs a week. Hip trouble off and on. Can take walks. Has spinal arthritis. H/o left femur fracture. No leg swelling. No numbness/tingling. Skin spot near nose, red, slightly itchy. New. Right ear discomfort. Hearing affected. Pap done elsewhere  Will do mammogram     Left forearm spot -- Swells and then calms down    Labs reviewed with patient. Review of Systems   Constitutional: Negative for fatigue and fever. Respiratory: Negative for shortness of breath. Cardiovascular: Negative for chest pain and leg swelling. Musculoskeletal: Positive for arthralgias and myalgias. Psychiatric/Behavioral: Positive for sleep disturbance. Objective   Physical Exam  Constitutional:       General: She is not in acute distress. Appearance: Normal appearance. She is not ill-appearing. Cardiovascular:      Rate and Rhythm: Normal rate and regular rhythm. Heart sounds: No murmur heard. Pulmonary:      Effort: Pulmonary effort is normal. No respiratory distress. Breath sounds: Normal breath sounds. No wheezing. Musculoskeletal:         General: No swelling. Comments: Left mid forearm with 1.5 cm x 1 cm oval lesion, slight tender with pressure and somewhat moveable   Neurological:      Mental Status: She is alert and oriented to person, place, and time.    Psychiatric:         Mood and Affect: Mood normal.           The 10-year ASCVD risk score (Mark Rogers, et al., 2013) is: 9.4%    Values used to calculate the score:      Age: 73 years      Sex: Female      Is Non- : No      Diabetic: No      Tobacco smoker: Yes      Systolic Blood Pressure: 525 mmHg      Is BP treated: No      HDL Cholesterol: 71 mg/dL      Total Cholesterol: 254 mg/dL    Lab

## 2021-08-31 NOTE — PATIENT INSTRUCTIONS
Staff -- obtain last pap smear from Dr. Rosalie Olson office in last years. Check out \"Classical Stretch\" by [employer]. Technique of COMAR[employer]rics to gently stretch muscle and strengthen them. Aim to stretch several 5 -10 minutes daily if you have chronic joint trouble. For the next level of health, aim for 30 minutes of stretching and aerobic work out 3 times a week. She has website, streaming options and youtube videos. [email or web address]/videos/     Here are mini-workout examples: -- there are many others:     For hip pain,   [employer] mini workout for hip pain relief (under 5 min.)  [email or web address]    For back pain and tight body joints,    [employer] Aging Backwards #5 - Relieve Your Pain (under 10 min.)  [email or web address]    [employer] Aging Backwards #3 - Sooth Your Joints  [email or web address]

## 2021-09-05 ASSESSMENT — ENCOUNTER SYMPTOMS: SHORTNESS OF BREATH: 0

## 2021-10-27 ENCOUNTER — HOSPITAL ENCOUNTER (OUTPATIENT)
Dept: MAMMOGRAPHY | Age: 66
Discharge: HOME | End: 2021-10-27
Payer: MEDICARE

## 2021-10-27 DIAGNOSIS — Z12.31 ENCOUNTER FOR SCREENING MAMMOGRAM FOR BREAST CANCER: ICD-10-CM

## 2021-10-27 PROCEDURE — 77063 BREAST TOMOSYNTHESIS BI: CPT

## 2021-11-22 ENCOUNTER — TELEPHONE (OUTPATIENT)
Dept: SURGERY | Age: 66
End: 2021-11-22

## 2022-03-02 ENCOUNTER — OFFICE VISIT (OUTPATIENT)
Dept: FAMILY MEDICINE CLINIC | Age: 67
End: 2022-03-02
Payer: MEDICARE

## 2022-03-02 VITALS
WEIGHT: 140.8 LBS | BODY MASS INDEX: 21.34 KG/M2 | DIASTOLIC BLOOD PRESSURE: 68 MMHG | OXYGEN SATURATION: 97 % | TEMPERATURE: 96.7 F | SYSTOLIC BLOOD PRESSURE: 128 MMHG | HEIGHT: 68 IN | HEART RATE: 71 BPM

## 2022-03-02 DIAGNOSIS — Z72.0 TOBACCO USER: ICD-10-CM

## 2022-03-02 DIAGNOSIS — Z00.00 WELCOME TO MEDICARE PREVENTIVE VISIT: Primary | ICD-10-CM

## 2022-03-02 DIAGNOSIS — R10.9 RECURRENT ABDOMINAL PAIN: ICD-10-CM

## 2022-03-02 DIAGNOSIS — F51.01 PRIMARY INSOMNIA: ICD-10-CM

## 2022-03-02 DIAGNOSIS — E78.00 HYPERCHOLESTEREMIA: ICD-10-CM

## 2022-03-02 DIAGNOSIS — M79.7 FIBROMYALGIA: Chronic | ICD-10-CM

## 2022-03-02 DIAGNOSIS — F41.1 GAD (GENERALIZED ANXIETY DISORDER): Chronic | ICD-10-CM

## 2022-03-02 DIAGNOSIS — K58.8 OTHER IRRITABLE BOWEL SYNDROME: ICD-10-CM

## 2022-03-02 PROCEDURE — 99213 OFFICE O/P EST LOW 20 MIN: CPT | Performed by: FAMILY MEDICINE

## 2022-03-02 PROCEDURE — G0402 INITIAL PREVENTIVE EXAM: HCPCS | Performed by: FAMILY MEDICINE

## 2022-03-02 RX ORDER — DIAZEPAM 5 MG/1
5 TABLET ORAL NIGHTLY PRN
Qty: 30 TABLET | Refills: 5 | Status: SHIPPED | OUTPATIENT
Start: 2022-03-02 | End: 2022-09-06 | Stop reason: SDUPTHER

## 2022-03-02 RX ORDER — BACLOFEN 20 MG/1
20 TABLET ORAL 2 TIMES DAILY PRN
Qty: 60 TABLET | Refills: 5 | Status: SHIPPED | OUTPATIENT
Start: 2022-03-02 | End: 2022-09-06 | Stop reason: SDUPTHER

## 2022-03-02 ASSESSMENT — PATIENT HEALTH QUESTIONNAIRE - PHQ9
SUM OF ALL RESPONSES TO PHQ QUESTIONS 1-9: 0
SUM OF ALL RESPONSES TO PHQ QUESTIONS 1-9: 0
1. LITTLE INTEREST OR PLEASURE IN DOING THINGS: 0
SUM OF ALL RESPONSES TO PHQ9 QUESTIONS 1 & 2: 0
SUM OF ALL RESPONSES TO PHQ QUESTIONS 1-9: 0
2. FEELING DOWN, DEPRESSED OR HOPELESS: 0
SUM OF ALL RESPONSES TO PHQ QUESTIONS 1-9: 0

## 2022-03-02 ASSESSMENT — LIFESTYLE VARIABLES
HOW MANY STANDARD DRINKS CONTAINING ALCOHOL DO YOU HAVE ON A TYPICAL DAY: 1 OR 2
HOW OFTEN DO YOU HAVE A DRINK CONTAINING ALCOHOL: MONTHLY OR LESS

## 2022-03-02 NOTE — PATIENT INSTRUCTIONS
Consider melatonin 3 mg to start, may increase about weekly by 3 mg. Up to max of 10 mg    Personalized Preventive Plan for Laurita Whitman - 3/2/2022  Medicare offers a range of preventive health benefits. Some of the tests and screenings are paid in full while other may be subject to a deductible, co-insurance, and/or copay. Some of these benefits include a comprehensive review of your medical history including lifestyle, illnesses that may run in your family, and various assessments and screenings as appropriate. After reviewing your medical record and screening and assessments performed today your provider may have ordered immunizations, labs, imaging, and/or referrals for you. A list of these orders (if applicable) as well as your Preventive Care list are included within your After Visit Summary for your review. Other Preventive Recommendations:    · A preventive eye exam performed by an eye specialist is recommended every 1-2 years to screen for glaucoma; cataracts, macular degeneration, and other eye disorders. · A preventive dental visit is recommended every 6 months. · Try to get at least 150 minutes of exercise per week or 10,000 steps per day on a pedometer . · Order or download the FREE \"Exercise & Physical Activity: Your Everyday Guide\" from [employer]. Call 5-280.178.9672 or search [employer] online. · You need 0690-7518 mg of calcium and 4267-7337 IU of vitamin D per day. It is possible to meet your calcium requirement with diet alone, but a vitamin D supplement is usually necessary to meet this goal.  · When exposed to the sun, use a sunscreen that protects against both UVA and UVB radiation with an SPF of 30 or greater. Reapply every 2 to 3 hours or after sweating, drying off with a towel, or swimming. · Always wear a seat belt when traveling in a car. Always wear a helmet when riding a bicycle or motorcycle.

## 2022-03-02 NOTE — PROGRESS NOTES
Medicare Annual Wellness Visit    Ashley Real is here for Medicare [address] was seen today for medicare awv. Diagnoses and all orders for this visit:    Welcome to Medicare preventive visit    Fibromyalgia  -     baclofen (LIORESAL) 20 MG tablet; Take 1 tablet by mouth 2 times daily as needed (muscle pain)  -     diazePAM (VALIUM) 5 MG tablet; Take 1 tablet by mouth nightly as needed for Anxiety for up to 30 days. CAMILLE (generalized anxiety disorder)  -     diazePAM (VALIUM) 5 MG tablet; Take 1 tablet by mouth nightly as needed for Anxiety for up to 30 days. Primary insomnia  -     diazePAM (VALIUM) 5 MG tablet; Take 1 tablet by mouth nightly as needed for Anxiety for up to 30 days. Tobacco user    Recurrent abdominal pain    Other irritable bowel syndrome    Hypercholesteremia  -     Comprehensive Metabolic Panel; Future  -     CBC with Auto Differential; Future  -     Lipid Panel; Future         Recommendations for Preventive Services Due: see orders and patient instructions/AVS.    Proper benzodiazepine use reviewed. Encouraged the smallest amount. Will try melatonin at night. Avoid norco.  reflls as above  Labs due next time. Continue smoking cessation efforts. Recommended screening schedule for the next 5-10 years is provided to the patient in written form: see Patient Instructions/AVS.     Return in 6 months (on 9/2/2022). Subjective     Anxiety -- chronic use of valium, uses nightly, 30 per months. Tried melatonin + theanine -- lead to nausea. Has not tried melatonin by itself. Using half mostly but does use half in her day at times. Fibromyalgia -- viscious at time, had 5 days of relief and not sure why. Not sure of triggers. Today is okay and will go shopping. PDMP reviewed. Diazepam 5 mg nightly. Norco 11/22/2021, 28 tablets used for intermittent IBS. Stress -- comes and goes. Working on decreasing  Grieving loss of friends.     Arm lesion, left. No change. Forgot to address with skin doctor. Smoker. 1.5 ppd, much better. Less desire  Diet -- balanced overall. Likes to enjoy Omega 3. 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 Tolerating medications well '    Patient's complete Health Risk Assessment and screening values have been reviewed and are found in Flowsheets. The following problems were reviewed today and where indicated follow up appointments were made and/or referrals ordered. Positive Risk Factor Screenings with Interventions:         Tobacco Use:     Tobacco Use: High Risk    Smoking Tobacco Use: Current Some Day Smoker    Smokeless Tobacco Use: Never Used     E-Cigarettes/Vaping Use     Questions Responses    E-Cigarette/Vaping Use     Start Date     Passive Exposure     Quit Date     Counseling Given     Comments         Substance Abuse - Tobacco Interventions:  tobacco cessation tips and resources provided           General Health and ACP:  General  In general, how would you say your health is?: Good  In the past 7 days, have you experienced any of the following: New or Increased Pain, New or Increased Fatigue, Loneliness, Social Isolation, Stress or Anger?: (!) Yes  Select all that apply: (!) New or Increased Fatigue,Loneliness  Do you get the social and emotional support that you need?: Yes  Do you have a Living Will?: Yes    Advance Directives     Power of  Living Will ACP-Advance Directive ACP-Power of     Not on File Filed on 11/11/13 Filed Not on File      General Health Risk Interventions:  · up and down. denies depression or anxiety not controlled.       Hearing/Vision:  Do you or your family notice any trouble with your hearing that hasn't been managed with hearing aids?: No  Do you have difficulty driving, watching TV, or doing any of your daily activities because of your eyesight?: No  Have you had an eye exam within the past year?: (!) No  No exam data present    Hearing/Vision Interventions:  · Vision concerns:  patient encouraged to make appointment with his/her eye specialist            Objective   Vitals:    03/02/22 1042   BP: 128/68   Site: Right Upper Arm   Position: Sitting   Pulse: 71   Temp: 96.7 °F (35.9 °C)   SpO2: 97%   Weight: 140 lb 12.8 oz (63.9 kg)   Height: 5' 8\" (1.727 m)      Body mass index is 21.41 kg/m². Gen: NAD, AAO x 3, coherent, pleasant  CTAB. RRR no m/r/g  Ear: externally, canals and TMs normal.   Ext: no edema    Psych: expresses some anxiety, reassures self. Allergies   Allergen Reactions    Aspirin Nausea Only    Elavil [Amitriptyline Hcl]      Felt foggy and did not feel right    Ibuprofen Other (See Comments)     Abdomen pain     Remeron [Mirtazapine] Other (See Comments)     CNS side effects.  Morphine Nausea And Vomiting    Percocet [Oxycodone-Acetaminophen] Nausea And Vomiting    Trazodone And Nefazodone Nausea Only     Prior to Visit Medications    Medication Sig Taking? Authorizing Provider   baclofen (LIORESAL) 20 MG tablet Take 1 tablet by mouth 2 times daily as needed (muscle pain) Yes Edi Freeman MD   diazePAM (VALIUM) 5 MG tablet Take 1 tablet by mouth nightly as needed for Anxiety for up to 30 days.  Yes Edi Freeman MD   NONFORMULARY [employer] Yes Historical Provider, MD   Omega-3 Fatty Acids (FISH OIL) 1000 MG CPDR Take 1,000 mg by mouth Yes Historical Provider, MD   vitamin E 400 UNIT capsule Take 400 Units by mouth daily Yes Historical Provider, MD   b complex vitamins capsule Take 1 capsule by mouth daily Yes Historical Provider, MD   vitamin D (CHOLECALCIFEROL) 1000 UNIT TABS tablet Take 1,000 Units by mouth daily Yes Historical Provider, MD   Multiple Vitamins-Minerals (THERAPEUTIC MULTIVITAMIN-MINERALS) tablet Take 1 tablet by mouth daily Yes Historical Provider, MD [place] (Including outside providers/suppliers regularly involved in providing care):   Patient Care Team:  Miles Earl MD as PCP - General (Family Medicine)  Miles Earl MD as PCP - [facility] Empaneled Provider    Reviewed and updated this visit:  Tobacco  Allergies  Meds  Problems  Med Hx  Surg Hx  Soc Hx  Fam Hx

## 2022-03-09 ENCOUNTER — TELEPHONE (OUTPATIENT)
Dept: SURGERY | Age: 67
End: 2022-03-09

## 2022-03-09 NOTE — TELEPHONE ENCOUNTER
Pt calling office requesting Norco for IBS flare up. She has not been seen in the office since 4/28/2021. She was prescribed Norco on 11/22/2021. Should we see her in the clinic before prescribing? Please advise, thank you.

## 2022-07-28 ENCOUNTER — TELEPHONE (OUTPATIENT)
Dept: SURGERY | Age: 67
End: 2022-07-28

## 2022-08-01 ENCOUNTER — HOSPITAL ENCOUNTER (OUTPATIENT)
Age: 67
Discharge: HOME | End: 2022-08-01
Payer: MEDICARE

## 2022-08-01 DIAGNOSIS — E78.00 HYPERCHOLESTEREMIA: ICD-10-CM

## 2022-08-01 LAB
ALBUMIN SERPL-MCNC: 4.8 G/DL (ref 3.5–5.1)
ALP BLD-CCNC: 99 U/L (ref 38–126)
ALT SERPL-CCNC: 16 U/L (ref 11–66)
ANION GAP SERPL CALCULATED.3IONS-SCNC: 14 MEQ/L (ref 8–16)
AST SERPL-CCNC: 20 U/L (ref 5–40)
BASOPHILS # BLD: 0.9 %
BASOPHILS ABSOLUTE: 0.1 THOU/MM3 (ref 0–0.1)
BILIRUB SERPL-MCNC: 0.5 MG/DL (ref 0.3–1.2)
BUN BLDV-MCNC: 6 MG/DL (ref 7–22)
CALCIUM SERPL-MCNC: 10.2 MG/DL (ref 8.5–10.5)
CHLORIDE BLD-SCNC: 106 MEQ/L (ref 98–111)
CHOLESTEROL, TOTAL: 259 MG/DL (ref 100–199)
CO2: 24 MEQ/L (ref 23–33)
CREAT SERPL-MCNC: 0.8 MG/DL (ref 0.4–1.2)
EOSINOPHIL # BLD: 2.8 %
EOSINOPHILS ABSOLUTE: 0.2 THOU/MM3 (ref 0–0.4)
ERYTHROCYTE [DISTWIDTH] IN BLOOD BY AUTOMATED COUNT: 13.2 % (ref 11.5–14.5)
ERYTHROCYTE [DISTWIDTH] IN BLOOD BY AUTOMATED COUNT: 47.8 FL (ref 35–45)
GFR SERPL CREATININE-BSD FRML MDRD: 72 ML/MIN/1.73M2
GLUCOSE BLD-MCNC: 101 MG/DL (ref 70–108)
HCT VFR BLD CALC: 49.8 % (ref 37–47)
HDLC SERPL-MCNC: 71 MG/DL
HEMOGLOBIN: 16.2 GM/DL (ref 12–16)
IMMATURE GRANS (ABS): 0.02 THOU/MM3 (ref 0–0.07)
IMMATURE GRANULOCYTES: 0.3 %
LDL CHOLESTEROL CALCULATED: 163 MG/DL
LYMPHOCYTES # BLD: 25.7 %
LYMPHOCYTES ABSOLUTE: 1.7 THOU/MM3 (ref 1–4.8)
MCH RBC QN AUTO: 32.5 PG (ref 26–33)
MCHC RBC AUTO-ENTMCNC: 32.5 GM/DL (ref 32.2–35.5)
MCV RBC AUTO: 100 FL (ref 81–99)
MONOCYTES # BLD: 5.9 %
MONOCYTES ABSOLUTE: 0.4 THOU/MM3 (ref 0.4–1.3)
NUCLEATED RED BLOOD CELLS: 0 /100 WBC
PLATELET # BLD: 257 THOU/MM3 (ref 130–400)
PMV BLD AUTO: 10.5 FL (ref 9.4–12.4)
POTASSIUM SERPL-SCNC: 5.3 MEQ/L (ref 3.5–5.2)
RBC # BLD: 4.98 MILL/MM3 (ref 4.2–5.4)
SEG NEUTROPHILS: 64.4 %
SEGMENTED NEUTROPHILS ABSOLUTE COUNT: 4.4 THOU/MM3 (ref 1.8–7.7)
SODIUM BLD-SCNC: 144 MEQ/L (ref 135–145)
TOTAL PROTEIN: 7.1 G/DL (ref 6.1–8)
TRIGL SERPL-MCNC: 127 MG/DL (ref 0–199)
WBC # BLD: 6.8 THOU/MM3 (ref 4.8–10.8)

## 2022-08-01 PROCEDURE — 80061 LIPID PANEL: CPT

## 2022-08-01 PROCEDURE — 36415 COLL VENOUS BLD VENIPUNCTURE: CPT

## 2022-08-01 PROCEDURE — 85025 COMPLETE CBC W/AUTO DIFF WBC: CPT

## 2022-08-01 PROCEDURE — 80053 COMPREHEN METABOLIC PANEL: CPT

## 2022-09-03 DIAGNOSIS — F51.01 PRIMARY INSOMNIA: ICD-10-CM

## 2022-09-03 DIAGNOSIS — F41.1 GAD (GENERALIZED ANXIETY DISORDER): Chronic | ICD-10-CM

## 2022-09-03 DIAGNOSIS — M79.7 FIBROMYALGIA: Chronic | ICD-10-CM

## 2022-09-06 ENCOUNTER — OFFICE VISIT (OUTPATIENT)
Dept: FAMILY MEDICINE CLINIC | Age: 67
End: 2022-09-06
Payer: MEDICARE

## 2022-09-06 VITALS
DIASTOLIC BLOOD PRESSURE: 84 MMHG | HEART RATE: 50 BPM | SYSTOLIC BLOOD PRESSURE: 126 MMHG | WEIGHT: 138 LBS | OXYGEN SATURATION: 98 % | BODY MASS INDEX: 20.92 KG/M2 | TEMPERATURE: 97.7 F | HEIGHT: 68 IN

## 2022-09-06 DIAGNOSIS — F51.01 PRIMARY INSOMNIA: ICD-10-CM

## 2022-09-06 DIAGNOSIS — M25.511 ACUTE PAIN OF RIGHT SHOULDER: Primary | ICD-10-CM

## 2022-09-06 DIAGNOSIS — M79.7 FIBROMYALGIA: Chronic | ICD-10-CM

## 2022-09-06 DIAGNOSIS — F41.1 GAD (GENERALIZED ANXIETY DISORDER): Chronic | ICD-10-CM

## 2022-09-06 PROCEDURE — 99214 OFFICE O/P EST MOD 30 MIN: CPT | Performed by: FAMILY MEDICINE

## 2022-09-06 PROCEDURE — 90694 VACC AIIV4 NO PRSRV 0.5ML IM: CPT | Performed by: FAMILY MEDICINE

## 2022-09-06 PROCEDURE — G0008 ADMIN INFLUENZA VIRUS VAC: HCPCS | Performed by: FAMILY MEDICINE

## 2022-09-06 PROCEDURE — 1123F ACP DISCUSS/DSCN MKR DOCD: CPT | Performed by: FAMILY MEDICINE

## 2022-09-06 RX ORDER — DIAZEPAM 5 MG/1
TABLET ORAL
Qty: 30 TABLET | OUTPATIENT
Start: 2022-09-06

## 2022-09-06 RX ORDER — DIAZEPAM 5 MG/1
5 TABLET ORAL NIGHTLY PRN
Qty: 30 TABLET | Refills: 5 | Status: SHIPPED | OUTPATIENT
Start: 2022-09-06 | End: 2022-10-06

## 2022-09-06 RX ORDER — BACLOFEN 20 MG/1
20 TABLET ORAL 2 TIMES DAILY PRN
Qty: 60 TABLET | Refills: 5 | Status: SHIPPED | OUTPATIENT
Start: 2022-09-06

## 2022-09-06 NOTE — PATIENT INSTRUCTIONS
For fibromyalgia flare ups,  Magnesium supplement -- may be added to melatonin  -- magnesium oxide may increase stool emptying  -- magnesium citrate or glycinate usually okay in tablet form  -- magnesium threonate has some small studies improve sleep quality and cognition    Check out [facility] for neck and shoulder exercises. Check out \"Classical Stretch\" by [employer]. Technique of [employer] to gently stretch muscle and strengthen them. Aim to stretch several 5 -10 minutes daily if you have chronic joint trouble. For the next level of health, aim for 30 minutes of stretching and aerobic work out 3 times a week. She has website, streaming options and youtube videos. [email or web address]     Here are mini-workout examples: -- there are many others:     For neck and upper back pain,     Alter Ways mini workout for upper body pain relief (under 5 min.)  [email or web address]    [employer] Workout - Shoulder, Upper Back & Pectoral Stretch (under 5 min.)  [email or web address]    [employer] Workout - Upper Back and Shoulder Pain-Relief Exercises (10 min.)  [email or web address]    [employer] Aging Backwards #5 - Relieve Your Pain (under 10 min.)  [email or web address]    [employer] Aging Backwards #3 - Sooth Your Joints  [email or web address]

## 2022-09-06 NOTE — PROGRESS NOTES
Vaccine Information Sheet, \"Influenza - Inactivated\"  given to [facility], or parent/legal guardian of  [facility] and verbalized understanding. Patient responses:    Have you ever had a reaction to a flu vaccine? No  Do you have an allergy to eggs, neomycin or polymixin? No  Do you have an allergy to Thimerosal, contact lens solution, or Merthiolate? No  Have you ever had Guillian Lucan Syndrome? No  Do you have any current illness? No  Do you have a temperature above 100 degrees? No  Are you pregnant? No  If pregnant, permission obtained from physician? No  Do you have an active neurological disorder? No      Flu vaccine given per order. Please see immunization tab.

## 2022-09-06 NOTE — PROGRESS NOTES
Silvia Chapa (:  1955) is a 78 y.o. female,Established patient, here for evaluation of the following chief complaint(s):  6 Month Follow-Up         ASSESSMENT/PLAN:  1. Acute pain of right shoulder  2. Fibromyalgia  -     diazePAM (VALIUM) 5 MG tablet; Take 1 tablet by mouth nightly as needed for Anxiety for up to 30 days. , Disp-30 tablet, R-5Normal  -     baclofen (LIORESAL) 20 MG tablet; Take 1 tablet by mouth 2 times daily as needed (muscle pain), Disp-60 tablet, R-5Normal  3. CAMILLE (generalized anxiety disorder)  -     diazePAM (VALIUM) 5 MG tablet; Take 1 tablet by mouth nightly as needed for Anxiety for up to 30 days. , Disp-30 tablet, R-5Normal  4. Primary insomnia  -     diazePAM (VALIUM) 5 MG tablet; Take 1 tablet by mouth nightly as needed for Anxiety for up to 30 days. , Disp-30 tablet, R-5Normal    Discussed with the patient that she does have an irritated right shoulder rotator cuff and that is very important to gently rehabilitated and get back to normal.  She would like to try home exercise program first.  We also discussed how the muscles involved have been chronically tight due to the fibromyalgia and these needs to be addressed long-term. Right shoulder stretches discussed and given. Heating pad, massage  Smoking cessation encouraged. Continue to monitor valium use. Not taking in day as did years ago, only at night. Trial of magnesium for muscle relaxation and improve sleep quality  Online resources of stretches for helping tight muscles and fibromyalgia pain given    Return in about 6 months (around 3/6/2023). Subjective   SUBJECTIVE/OBJECTIVE:  HPI    Patient following for anxiety and fibromyalgia. Overall doing about the same. Stress continues to be present with Family situations and her medical conditions. No changes since last time. Fibro flares -- take medications and lays around. Baclofen and valium used for these times. Sleep is up and down.   Pain from fibromyalgia or anxiety does interrupt good sleep. Right shoulder pain still -- 2 months. Has tried stretches which helped left shoudler. 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 She cannot recall an injury or trauma. It is not getting worse but it is not getting better. She denies any tingling or weakness in the arm. Review of Systems     No fevers or chills. Objective   Physical Exam    Gen: NAD, AAO x 3, coherent, pleasant     CTAb. RRR    Right shoulder --patient with limited range of motion with some pain against resistance but no outright weakness. No deformity is noted in the joints. Patient does not have bony pain but does have some upper arm discomfort in the dorsal region. Patient has negative impingement testing anteriorly and is not able to do liftoff test due to pain. No sensory losses and elbow and wrist rubor right. Neck muscle tightness is noted bilaterally with a bit more tenderness on the right side including muscles in the upper back going into her shoulder. An electronic signature was used to authenticate this note.     --Wilmer Cr MD

## 2022-10-25 ENCOUNTER — TELEPHONE (OUTPATIENT)
Dept: SURGERY | Age: 67
End: 2022-10-25

## 2022-10-25 DIAGNOSIS — R10.84 GENERALIZED ABDOMINAL PAIN: Primary | ICD-10-CM

## 2022-10-25 RX ORDER — HYDROCODONE BITARTRATE AND ACETAMINOPHEN 7.5; 325 MG/1; MG/1
1 TABLET ORAL EVERY 6 HOURS PRN
Qty: 28 TABLET | Refills: 0 | Status: SHIPPED | OUTPATIENT
Start: 2022-10-25 | End: 2022-11-04

## 2022-11-30 ENCOUNTER — HOSPITAL ENCOUNTER (OUTPATIENT)
Dept: MAMMOGRAPHY | Age: 67
Discharge: HOME | End: 2022-11-30
Payer: MEDICARE

## 2022-11-30 DIAGNOSIS — Z12.31 VISIT FOR SCREENING MAMMOGRAM: ICD-10-CM

## 2022-11-30 PROCEDURE — 77063 BREAST TOMOSYNTHESIS BI: CPT

## 2022-12-22 ENCOUNTER — TELEPHONE (OUTPATIENT)
Dept: SURGERY | Age: 67
End: 2022-12-22

## 2022-12-22 DIAGNOSIS — R10.84 GENERALIZED ABDOMINAL PAIN: Primary | ICD-10-CM

## 2022-12-22 RX ORDER — HYDROCODONE BITARTRATE AND ACETAMINOPHEN 5; 325 MG/1; MG/1
1 TABLET ORAL EVERY 6 HOURS PRN
Qty: 28 TABLET | Refills: 0 | Status: SHIPPED | OUTPATIENT
Start: 2022-12-22 | End: 2023-01-19

## 2023-02-23 DIAGNOSIS — R10.84 GENERALIZED ABDOMINAL PAIN: Primary | ICD-10-CM

## 2023-02-23 RX ORDER — HYDROCODONE BITARTRATE AND ACETAMINOPHEN 5; 325 MG/1; MG/1
1 TABLET ORAL EVERY 6 HOURS PRN
Qty: 28 TABLET | Refills: 0 | Status: SHIPPED | OUTPATIENT
Start: 2023-02-23 | End: 2023-03-02

## 2023-03-06 SDOH — ECONOMIC STABILITY: HOUSING INSECURITY
IN THE LAST 12 MONTHS, WAS THERE A TIME WHEN YOU DID NOT HAVE A STEADY PLACE TO SLEEP OR SLEPT IN A SHELTER (INCLUDING NOW)?: NO

## 2023-03-06 SDOH — ECONOMIC STABILITY: INCOME INSECURITY: HOW HARD IS IT FOR YOU TO PAY FOR THE VERY BASICS LIKE FOOD, HOUSING, MEDICAL CARE, AND HEATING?: NOT VERY HARD

## 2023-03-06 SDOH — ECONOMIC STABILITY: FOOD INSECURITY: WITHIN THE PAST 12 MONTHS, THE FOOD YOU BOUGHT JUST DIDN'T LAST AND YOU DIDN'T HAVE MONEY TO GET MORE.: NEVER TRUE

## 2023-03-06 SDOH — ECONOMIC STABILITY: FOOD INSECURITY: WITHIN THE PAST 12 MONTHS, YOU WORRIED THAT YOUR FOOD WOULD RUN OUT BEFORE YOU GOT MONEY TO BUY MORE.: NEVER TRUE

## 2023-03-08 ENCOUNTER — OFFICE VISIT (OUTPATIENT)
Dept: FAMILY MEDICINE CLINIC | Age: 68
End: 2023-03-08

## 2023-03-08 VITALS
TEMPERATURE: 97.4 F | HEIGHT: 68 IN | SYSTOLIC BLOOD PRESSURE: 130 MMHG | RESPIRATION RATE: 14 BRPM | HEART RATE: 54 BPM | OXYGEN SATURATION: 95 % | WEIGHT: 128.9 LBS | BODY MASS INDEX: 19.54 KG/M2 | DIASTOLIC BLOOD PRESSURE: 80 MMHG

## 2023-03-08 DIAGNOSIS — R10.9 RECURRENT ABDOMINAL PAIN: ICD-10-CM

## 2023-03-08 DIAGNOSIS — R10.32 LLQ ABDOMINAL PAIN: ICD-10-CM

## 2023-03-08 DIAGNOSIS — K66.0 ABDOMINAL ADHESIONS: Chronic | ICD-10-CM

## 2023-03-08 DIAGNOSIS — Z00.00 INITIAL MEDICARE ANNUAL WELLNESS VISIT: Primary | ICD-10-CM

## 2023-03-08 DIAGNOSIS — R19.4 BOWEL HABIT CHANGES: ICD-10-CM

## 2023-03-08 DIAGNOSIS — R63.4 WEIGHT LOSS: ICD-10-CM

## 2023-03-08 DIAGNOSIS — F51.01 PRIMARY INSOMNIA: ICD-10-CM

## 2023-03-08 DIAGNOSIS — F41.1 GAD (GENERALIZED ANXIETY DISORDER): Chronic | ICD-10-CM

## 2023-03-08 DIAGNOSIS — M79.7 FIBROMYALGIA: Chronic | ICD-10-CM

## 2023-03-08 DIAGNOSIS — Z72.0 TOBACCO USER: ICD-10-CM

## 2023-03-08 RX ORDER — POLYETHYLENE GLYCOL 3350 17 G/17G
8-17 POWDER, FOR SOLUTION ORAL DAILY PRN
Qty: 510 G | Refills: 5 | Status: SHIPPED | OUTPATIENT
Start: 2023-03-08 | End: 2023-04-07

## 2023-03-08 RX ORDER — DIAZEPAM 5 MG/1
5 TABLET ORAL NIGHTLY PRN
Qty: 30 TABLET | Refills: 5 | Status: SHIPPED | OUTPATIENT
Start: 2023-03-08 | End: 2023-04-07

## 2023-03-08 RX ORDER — PSYLLIUM SEED (WITH DEXTROSE)
3.4 POWDER (GRAM) ORAL DAILY
COMMUNITY

## 2023-03-08 RX ORDER — DICYCLOMINE HYDROCHLORIDE 10 MG/1
10 CAPSULE ORAL 3 TIMES DAILY PRN
Qty: 120 CAPSULE | Refills: 0 | Status: SHIPPED | OUTPATIENT
Start: 2023-03-08

## 2023-03-08 RX ORDER — BACLOFEN 20 MG/1
20 TABLET ORAL 2 TIMES DAILY PRN
Qty: 60 TABLET | Refills: 5 | Status: SHIPPED | OUTPATIENT
Start: 2023-03-08

## 2023-03-08 ASSESSMENT — PATIENT HEALTH QUESTIONNAIRE - PHQ9
SUM OF ALL RESPONSES TO PHQ QUESTIONS 1-9: 6
2. FEELING DOWN, DEPRESSED OR HOPELESS: 0
SUM OF ALL RESPONSES TO PHQ QUESTIONS 1-9: 6
SUM OF ALL RESPONSES TO PHQ9 QUESTIONS 1 & 2: 0
5. POOR APPETITE OR OVEREATING: 0
1. LITTLE INTEREST OR PLEASURE IN DOING THINGS: 0
9. THOUGHTS THAT YOU WOULD BE BETTER OFF DEAD, OR OF HURTING YOURSELF: 0
3. TROUBLE FALLING OR STAYING ASLEEP: 2
10. IF YOU CHECKED OFF ANY PROBLEMS, HOW DIFFICULT HAVE THESE PROBLEMS MADE IT FOR YOU TO DO YOUR WORK, TAKE CARE OF THINGS AT HOME, OR GET ALONG WITH OTHER PEOPLE: 0
6. FEELING BAD ABOUT YOURSELF - OR THAT YOU ARE A FAILURE OR HAVE LET YOURSELF OR YOUR FAMILY DOWN: 0
4. FEELING TIRED OR HAVING LITTLE ENERGY: 3
7. TROUBLE CONCENTRATING ON THINGS, SUCH AS READING THE NEWSPAPER OR WATCHING TELEVISION: 1
SUM OF ALL RESPONSES TO PHQ QUESTIONS 1-9: 6
8. MOVING OR SPEAKING SO SLOWLY THAT OTHER PEOPLE COULD HAVE NOTICED. OR THE OPPOSITE, BEING SO FIGETY OR RESTLESS THAT YOU HAVE BEEN MOVING AROUND A LOT MORE THAN USUAL: 0
SUM OF ALL RESPONSES TO PHQ QUESTIONS 1-9: 6

## 2023-03-08 ASSESSMENT — LIFESTYLE VARIABLES
HOW MANY STANDARD DRINKS CONTAINING ALCOHOL DO YOU HAVE ON A TYPICAL DAY: PATIENT DOES NOT DRINK
HOW OFTEN DO YOU HAVE A DRINK CONTAINING ALCOHOL: NEVER

## 2023-03-08 NOTE — PROGRESS NOTES
Medicare Annual Wellness Visit    Kenji Vaughan is here for Medicare AWV (C/O abdominal and back pain. )    Assessment & Plan   Initial Medicare annual wellness visit  Abdominal adhesions  -     Comprehensive Metabolic Panel; Future  -     CBC with Auto Differential; Future  -     Sedimentation Rate; Future  -     C-Reactive Protein; Future  -     dicyclomine (BENTYL) 10 MG capsule; Take 1 capsule by mouth 3 times daily as needed (intestinal spasm and abdominal pain), Disp-120 capsule, R-0Normal  -     polyethylene glycol (GLYCOLAX) 17 GM/SCOOP powder; Take 8-17 g by mouth daily as needed (constipation), Disp-510 g, R-5Normal  Recurrent abdominal pain  -     Comprehensive Metabolic Panel; Future  -     CBC with Auto Differential; Future  -     Sedimentation Rate; Future  -     C-Reactive Protein; Future  -     dicyclomine (BENTYL) 10 MG capsule; Take 1 capsule by mouth 3 times daily as needed (intestinal spasm and abdominal pain), Disp-120 capsule, R-0Normal  -     polyethylene glycol (GLYCOLAX) 17 GM/SCOOP powder; Take 8-17 g by mouth daily as needed (constipation), Disp-510 g, R-5Normal  Tobacco user  -     Comprehensive Metabolic Panel; Future  -     CBC with Auto Differential; Future  -     Sedimentation Rate; Future  -     C-Reactive Protein; Future  -     dicyclomine (BENTYL) 10 MG capsule; Take 1 capsule by mouth 3 times daily as needed (intestinal spasm and abdominal pain), Disp-120 capsule, R-0Normal  -     polyethylene glycol (GLYCOLAX) 17 GM/SCOOP powder; Take 8-17 g by mouth daily as needed (constipation), Disp-510 g, R-5Normal  LLQ abdominal pain  -     Comprehensive Metabolic Panel; Future  -     CBC with Auto Differential; Future  -     Sedimentation Rate; Future  -     C-Reactive Protein; Future  -     dicyclomine (BENTYL) 10 MG capsule;  Take 1 capsule by mouth 3 times daily as needed (intestinal spasm and abdominal pain), Disp-120 capsule, R-0Normal  -     polyethylene glycol (GLYCOLAX) 17 GM/SCOOP powder; Take 8-17 g by mouth daily as needed (constipation), Disp-510 g, R-5Normal  Weight loss  -     Comprehensive Metabolic Panel; Future  -     CBC with Auto Differential; Future  -     Sedimentation Rate; Future  -     C-Reactive Protein; Future  -     dicyclomine (BENTYL) 10 MG capsule; Take 1 capsule by mouth 3 times daily as needed (intestinal spasm and abdominal pain), Disp-120 capsule, R-0Normal  -     polyethylene glycol (GLYCOLAX) 17 GM/SCOOP powder; Take 8-17 g by mouth daily as needed (constipation), Disp-510 g, R-5Normal  Bowel habit changes  -     Comprehensive Metabolic Panel; Future  -     CBC with Auto Differential; Future  -     Sedimentation Rate; Future  -     C-Reactive Protein; Future  -     dicyclomine (BENTYL) 10 MG capsule; Take 1 capsule by mouth 3 times daily as needed (intestinal spasm and abdominal pain), Disp-120 capsule, R-0Normal  -     polyethylene glycol (GLYCOLAX) 17 GM/SCOOP powder; Take 8-17 g by mouth daily as needed (constipation), Disp-510 g, R-5Normal  Fibromyalgia  -     diazePAM (VALIUM) 5 MG tablet; Take 1 tablet by mouth nightly as needed for Anxiety for up to 30 days. , Disp-30 tablet, R-5Normal  -     baclofen (LIORESAL) 20 MG tablet; Take 1 tablet by mouth 2 times daily as needed (muscle pain), Disp-60 tablet, R-5Normal  CAMILLE (generalized anxiety disorder)  -     diazePAM (VALIUM) 5 MG tablet; Take 1 tablet by mouth nightly as needed for Anxiety for up to 30 days. , Disp-30 tablet, R-5Normal  Primary insomnia  -     diazePAM (VALIUM) 5 MG tablet; Take 1 tablet by mouth nightly as needed for Anxiety for up to 30 days. , Disp-30 tablet, R-5Normal    Recommendations for Preventive Services Due: see orders and patient instructions/AVS.    Concern with patient's abdominal issues and weight loss. Start with above. CT scan as next steps if not improved  Anxiety management would be helpful.    If all above negative, then GI + different SSRI  Nutritional supplements with more protein advised. Recommended screening schedule for the next 5-10 years is provided to the patient in written form: see Patient Instructions/AVS.     Return in about 3 weeks (around 3/29/2023) for abdomina pain follow up -- 8 am or 1 pm on Tuesday or Thursday . Subjective     IBS flare up for 1 month. Admits to nervousness. Valium, baclofen. Uses these to feel better. They help some. Bowels -- will have small amounts, then diarrhea. Wt Readings from Last 3 Encounters:   03/08/23 128 lb 14.4 oz (58.5 kg)   09/06/22 138 lb (62.6 kg)   03/02/22 140 lb 12.8 oz (63.9 kg)   Lost 10 lbs. 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 Alexandria Murguia about others    4/2021 - CT scan   H/o adhesions. S/p appey, colectomy  Past Surgical History:   Procedure Laterality Date    ABDOMINAL EXPLORATION SURGERY      APPENDECTOMY      BREAST BIOPSY Right 12/14/2016    Excisional - Dr. Grzegorz Rothman  oct 2008    anterior with plate    CHOLECYSTECTOMY  3/4/08    Dr. Will Kong  12/09/2013    Dr. Pasquale Dean  11/15/11    Dr Neeta Stuart    COLONOSCOPY Left 11/25/2020    COLONOSCOPY POLYPECTOMY SNARE/COLD BIOPSY performed by Buzz Kaba MD at [address] Left 06/2018    Trauma Femur    ARTIE STEROTACTIC LOC BREAST BIOPSY RIGHT Right 11/18/2016    benign    OTHER SURGICAL HISTORY  12/9/13    Sigmoid colon resection - Dr. Maricruz Rodriguez      left    TONSILLECTOMY      as a child    UPPER GASTROINTESTINAL ENDOSCOPY  11/15/11    Dr Neeta Stuart             Patient's complete Health Risk Assessment and screening values have been reviewed and are found in Flowsheets. The following problems were reviewed today and where indicated follow up appointments were made and/or referrals ordered.     Positive Risk Factor Screenings with Interventions:        Depression:  PHQ-2 Score: 0  PHQ-9 Total Score: 6    Interpretation:   1-4 = minimal  5-9 = mild  10-14 = moderate  15-19 = moderately severe  20-27 = severe  Interventions:  Anxiety is worse. concerned         Self-assessment of health: In general, how would you say your health is?: (!) Poor    Interventions:  As above    General HRA Questions:  Select all that apply: (!) New or Increased Pain, New or Increased Fatigue, Stress    Pain Interventions:  As above     Fatigue Interventions:  As above    Stress Interventions:  As above              Advanced Directives:  Do you have a Living Will?: (!) No    Intervention:          Tobacco Use:  Tobacco Use: High Risk    Smoking Tobacco Use: Some Days    Smokeless Tobacco Use: Never    Passive Exposure: Not on file     E-cigarette/Vaping       Questions Responses    E-cigarette/Vaping Use     Start Date     Passive Exposure     Quit Date     Counseling Given     Comments         Interventions:  Patient declined any further intervention or treatment                        Objective   Vitals:    03/08/23 0947   BP: 130/80   Site: Left Upper Arm   Position: Sitting   Pulse: 54   Resp: 14   Temp: 97.4 °F (36.3 °C)   TempSrc: Temporal   SpO2: 95%   Weight: 128 lb 14.4 oz (58.5 kg)   Height: 5' 8\" (1.727 m)      Body mass index is 19.6 kg/m². Gen: NAD, AAO x 3, coherent, anxious   Conjunctiva clear bilaterally    Neck no LAD   CTAB. RRR   Abd: soft, mild LLQ tenderness, no rebound/guarding. Declines UA      Allergies   Allergen Reactions    Aspirin Nausea Only    Elavil [Amitriptyline Hcl]      Felt foggy and did not feel right    Ibuprofen Other (See Comments)     Abdomen pain     Remeron [Mirtazapine] Other (See Comments)     CNS side effects. Morphine Nausea And Vomiting    Percocet [Oxycodone-Acetaminophen] Nausea And Vomiting    Trazodone And Nefazodone Nausea Only     Prior to Visit Medications    Medication Sig Taking?  Authorizing Provider   psyllium (METAMUCIL) 28.3 % POWD powder Take 3.4 g by mouth daily Yes Historical Provider, MD   dicyclomine (BENTYL) 10 MG capsule Take 1 capsule by mouth 3 times daily as needed (intestinal spasm and abdominal pain) Yes Chrissie Espinal MD   polyethylene glycol (GLYCOLAX) 17 GM/SCOOP powder Take 8-17 g by mouth daily as needed (constipation) Yes Chrissie Espinal MD   diazePAM (VALIUM) 5 MG tablet Take 1 tablet by mouth nightly as needed for Anxiety for up to 30 days. Yes Chrissie Espinal MD   baclofen (LIORESAL) 20 MG tablet Take 1 tablet by mouth 2 times daily as needed (muscle pain) Yes Chrissie Espinal MD   vitamin D (CHOLECALCIFEROL) 1000 UNIT TABS tablet Take 1,000 Units by mouth daily  Patient not taking: Reported on 3/8/2023  Historical Provider, MD   Multiple Vitamins-Minerals (THERAPEUTIC MULTIVITAMIN-MINERALS) tablet Take 1 tablet by mouth daily  Historical Provider, MD       [facility] (Including outside providers/suppliers regularly involved in providing care):   Patient Care Team:  Chrissie Espinal MD as PCP - General (Family Medicine)  Chrissie Espinal MD as PCP - Empaneled Provider     Reviewed and updated this visit:  Tobacco  Allergies  Meds  Problems  Med Hx  Surg Hx  Soc Hx  Fam Hx               Chrissie Espinal MD

## 2023-03-08 NOTE — PATIENT INSTRUCTIONS
Learning About Mindfulness for Stress  What are mindfulness and stress? Stress is what you feel when you have to handle more than you are used to. A lot of things can cause stress. You may feel stress when you go on a job interview, take a test, or run a race. This kind of short-term stress is normal and even useful. It can help you if you need to work hard or react quickly. Stress also can last a long time. Long-term stress is caused by stressful situations or events. Examples of long-term stress include long-term health problems, ongoing problems at work, and conflicts in your family. Long-term stress can harm your health. Mindfulness is a focus only on things happening in the present moment. It's a process of purposefully paying attention to and being aware of your surroundings, your emotions, your thoughts, and how your body feels. You are aware of these things, but you aren't judging these experiences as \"good\" or \"bad. \" Mindfulness can help you learn to calm your mind and body to help you cope with illness, pain, and stress. How does mindfulness help to relieve stress? Mindfulness can help quiet your mind and relax your body. Studies show that it can help some people sleep better, feel less anxious, and bring their blood pressure down. And it's been shown to help some people live and cope better with certain health problems like heart disease, depression, chronic pain, and cancer. How do you practice mindfulness? To be mindful is to pay attention, to be present, and to be accepting. When you're mindful, you do just one thing and you pay close attention to that one thing. For example, you may sit quietly and notice your emotions or how your food tastes and smells. When you're present, you focus on the things that are happening right now. You let go of your thoughts about the past and the future. When you dwell on the past or the future, you miss moments that can heal and strengthen you.  You may miss moments like hearing a child laugh or seeing a friendly face when you think you're all alone. When you're accepting, you don't  the present moment. Instead you accept your thoughts and feelings as they come. You can practice anytime, anywhere, and in any way you choose. You can practice in many ways. Here are a few ideas:  While doing your chores, like washing the dishes, let your mind focus on what's in your hand. What does the dish feel like? Is the water warm or cold? Go outside and take a few deep breaths. What is the air like? Is it warm or cold? When you can, take some time at the start of your day to sit alone and think. Take a slow walk by yourself. Count your steps while you breathe in and out. Try yoga breathing exercises, stretches, and poses to strengthen and relax your muscles. At work, if you can, try to stop for a few moments each hour. Note how your body feels. Let yourself regroup and let your mind settle before you return to what you were doing. If you struggle with anxiety or \"worry thoughts,\" imagine your mind as a blue opal and your worry thoughts as clouds. Now imagine those worry thoughts floating across your mind's opal. Just let them pass by as you watch. Follow-up care is a key part of your treatment and safety. Be sure to make and go to all appointments, and call your doctor if you are having problems. It's also a good idea to know your test results and keep a list of the medicines you take. Where can you learn more? Go to http://www.upton.com/ and enter M676 to learn more about \"Learning About Mindfulness for Stress. \"  Current as of: February 9, 2022               Content Version: 13.5  © 2006-2022 Healthwise, Incorporated. Care instructions adapted under license by [facility] [employer] [facility] ([facility]).  If you have questions about a medical condition or this instruction, always ask your healthcare professional. [address] any warranty or liability for your use of this information.           Learning About Emotional Support  When do you need emotional support?     You might find getting support from others helpful when you have a long-term health problem. Often people feel alone, confused, or scared when coping with an illness. But you aren't alone. Other people are going through the same thing you are and know how you feel.  Talking with others about your feelings can help you feel better.  Your family and friends can give you support. So can your doctor, a support group, or a Yazidi. If you have a support network, you will not feel as alone. You will learn new ways to deal with your situation, and you may try harder to overcome it.  Where you can get support  Family and friends: They can help you cope by giving you comfort and encouragement.  Counseling: Professional counseling can help you cope with situations that interfere with your life and cause stress. Counseling can help you understand and deal with your illness.  Your doctor: Find a doctor you trust and feel comfortable with. Be open and honest about your fears and concerns. Your doctor can help you get the right medical treatments, including counseling.  Spiritual or Christianity groups: They can provide comfort and may be able to help you find counseling or other social support services.  Social groups: They can help you meet new people and get involved in activities you enjoy.  Community support groups: In a support group, you can talk to others who have dealt with the same problems or illness as you. You can encourage one another and learn ways to cope with tough emotions.  How to find a support group  Ask your doctor, counselor, or other health professional for suggestions.  Contact your local Yazidi, Taoism, Denominational, or other Christianity group.  Ask your family and friends.  Ask people who have the same health concerns.  Go online. Forums and blogs let you read messages from others and leave your own  messages. You can exchange stories, vent your frustrations, and ask and answer questions. Contact a city, state, or national group that provides support for your health concerns. Your library or community center may have a list of these groups. Or you can look for information online. Look for a support group that works for you. Ask yourself if you prefer structure and would like a , or if you would like a less formal group. Do you prefer face-to-face meetings? Or do you feel more secure in online chat rooms or forums? Supportive relationships  A supportive relationship includes emotional support such as love, trust, and understanding, as well as advice and concrete help, such as help managing your time. Reach out to others  Family and friends can help you. Ask them to:  Listen to you and give you encouragement. This can keep you from feeling hopeless or alone. Help with small daily tasks or with bigger problems. A helping hand can keep you from feeling overwhelmed. Help you manage a health problem. For example, ask them to go to doctor visits with you. Your loved ones can offer support by being involved in your medical care. Respect your relationships  A good relationship is also a two-way street. You count on help from others, but they also count on you. Know your friends' limits. You don't have to see or call your friends every day. If you are going through a rough patch, ask friends if you can contact them outside of the usual boundaries. Don't always complain or talk about yourself. Know when it's time to stop talking and listen or just enjoy your friend's company. Know that good friends can be a bad influence. For example, if a friend encourages you to drink when you know it will harm you, you may want to end the friendship. Where can you learn more? Go to http://www.woods.com/ and enter G092 to learn more about \"Learning About Emotional Support. \"  Current as of: February 9, 2022               Content Version: 13.5  © 5689-3813 [employer]. Care instructions adapted under license by [facility] ([facility]). If you have questions about a medical condition or this instruction, always ask your healthcare professional. [address] any warranty or liability for your use of this information. Fatigue: Care Instructions  Your Care Instructions     Fatigue is a feeling of tiredness, exhaustion, or lack of energy. You may feel fatigue because of too much or not enough activity. It can also come from stress, lack of sleep, boredom, and poor diet. Many medical problems, such as viral infections, can cause fatigue. Emotional problems, especially depression, are often the cause of fatigue. Fatigue is most often a symptom of another problem. Treatment for fatigue depends on the cause. For example, if you have fatigue because you have a certain health problem, treating this problem also treats your fatigue. If depression or anxiety is the cause, treatment may help. Follow-up care is a key part of your treatment and safety. Be sure to make and go to all appointments, and call your doctor if you are having problems. It's also a good idea to know your test results and keep a list of the medicines you take. How can you care for yourself at home? Get regular exercise. But don't overdo it. Go back and forth between rest and exercise. Get plenty of rest.  Eat a healthy diet. Do not skip meals, especially breakfast.  Reduce your use of caffeine, tobacco, and alcohol. Caffeine is most often found in coffee, tea, cola drinks, and chocolate. Limit medicines that can cause fatigue. This includes tranquilizers and cold and allergy medicines. When should you call for help?   Watch closely for changes in your health, and be sure to contact your doctor if:    You have new symptoms such as fever or a rash.     Your fatigue gets worse.     You have been feeling down, depressed, or hopeless. Or you may have lost interest in things that you usually enjoy.     You are not getting better as expected. Where can you learn more? Go to http://www.Uguru/ and enter U518 to learn more about \"Fatigue: Care Instructions. \"  Current as of: February 9, 2022               Content Version: 13.5  © 2006-2022 [employer]. Care instructions adapted under license by [facility] ([facility]). If you have questions about a medical condition or this instruction, always ask your healthcare professional. [address] any warranty or liability for your use of this information. Learning About Stress  What is stress? Stress is what you feel when you have to handle more than you are used to. Stress is a fact of life for most people, and it affects everyone differently. What causes stress for you may not be stressful for someone else. A lot of things can cause stress. You may feel stress when you go on a job interview, take a test, or run a race. This kind of short-term stress is normal and even useful. It can help you if you need to work hard or react quickly. For example, stress can help you finish an important job on time. Stress also can last a long time. Long-term stress is caused by stressful situations or events. Examples of long-term stress include long-term health problems, ongoing problems at work, or conflicts in your family. Long-term stress can harm your health. How does stress affect your health? When you are stressed, your body responds as though you are in danger. It makes hormones that speed up your heart, make you breathe faster, and give you a burst of energy. This is called the fight-or-flight stress response. If the stress is over quickly, your body goes back to normal and no harm is done. But if stress happens too often or lasts too long, it can have bad effects.  Long-term stress can make you more likely to get sick, and it can make symptoms of some diseases worse. If you tense up when you are stressed, you may develop neck, shoulder, or low back pain. Stress is linked to high blood pressure and heart disease. Stress also harms your emotional health. It can make you pond, tense, or depressed. Your relationships may suffer, and you may not do well at work or school. What can you do to manage stress? How to relax your mind   Write. It may help to write about things that are bothering you. This helps you find out how much stress you feel and what is causing it. When you know this, you can find better ways to cope. Let your feelings out. Talk, laugh, cry, and express anger when you need to. Talking with friends, family, a counselor, or a member of the clergy about your feelings is a healthy way to relieve stress. Do something you enjoy. For example, listen to music or go to a movie. Practice your hobby or do volunteer work. Meditate. This can help you relax, because you are not worrying about what happened before or what may happen in the future. Do guided imagery. Imagine yourself in any setting that helps you feel calm. You can use audiotapes, books, or a teacher to guide you. How to relax your body   Do something active. Exercise or activity can help reduce stress. Walking is a great way to get started. Even everyday activities such as housecleaning or yard work can help. Do breathing exercises. For example:  From a standing position, bend forward from the waist with your knees slightly bent. Let your arms dangle close to the floor. Breathe in slowly and deeply as you return to a standing position. Roll up slowly and lift your head last.  Hold your breath for just a few seconds in the standing position. Breathe out slowly and bend forward from the waist.  Try yoga or lily chi. These techniques combine exercise and meditation. You may need some training at first to learn them. What can you do to prevent stress?   Manage your time. This helps you find time to do the things you want and need to do. Get enough sleep. Your body recovers from the stresses of the day while you are sleeping. Get support. Your family, friends, and community can make a difference in how you experience stress. Where can you learn more? Go to http://www.upton.com/ and enter N032 to learn more about \"Learning About Stress. \"  Current as of: October 6, 2021               Content Version: 13.5  © 2006-2022 Healthwise, Magnetic. Care instructions adapted under license by [facility] ([facility]). If you have questions about a medical condition or this instruction, always ask your healthcare professional. [place] [address] any warranty or liability for your use of this information. Advance Directives: Care Instructions  Overview  An advance directive is a legal way to state your wishes at the end of your life. It tells your family and your doctor what to do if you can't say what you want. There are two main types of advance directives. You can change them any time your wishes change. Living will. This form tells your family and your doctor your wishes about life support and other treatment. The form is also called a declaration. Medical power of . This form lets you name a person to make treatment decisions for you when you can't speak for yourself. This person is called a health care agent (health care proxy, health care surrogate). The form is also called a durable power of  for health care. If you do not have an advance directive, decisions about your medical care may be made by a family member, or by a doctor or a  who doesn't know you. It may help to think of an advance directive as a gift to the people who care for you. If you have one, they won't have to make tough decisions by themselves.   For more information, including forms for your state, see the [employer] website (www.caringinfo.[email or web address]/planning/advance-[email or web address]). Follow-up care is a key part of your treatment and safety. Be sure to make and go to all appointments, and call your doctor if you are having problems. It's also a good idea to know your test results and keep a list of the medicines you take. What should you include in an advance directive? Many states have a unique advance directive form. (It may ask you to address specific issues.) Or you might use a universal form that's approved by many states. If your form doesn't tell you what to address, it may be hard to know what to include in your advance directive. Use the questions below to help you get started. Who do you want to make decisions about your medical care if you are not able to? What life-support measures do you want if you have a serious illness that gets worse over time or can't be cured? What are you most afraid of that might happen? (Maybe you're afraid of having pain, losing your independence, or being kept alive by machines.)  Where would you prefer to die? (Your home? A hospital? A nursing home?)  Do you want to donate your organs when you die? Do you want certain Moravian practices performed before you die? When should you call for help? Be sure to contact your doctor if you have any questions. Where can you learn more? Go to http://www.upton.com/ and enter R264 to learn more about \"Advance Directives: Care Instructions. \"  Current as of: June 16, 2022               Content Version: 13.5  © 2006-2022 Healthwise, Incorporated. Care instructions adapted under license by [facility] [address] If you have questions about a medical condition or this instruction, always ask your healthcare professional. [place] [address] any warranty or liability for your use of this information.            A Healthy Heart: Care Instructions  Your Care Instructions     Coronary artery disease, also called heart disease, occurs when a substance called plaque builds up in the vessels that supply oxygen-rich blood to your heart muscle. This can narrow the blood vessels and reduce blood flow. A heart attack happens when blood flow is completely blocked. A high-fat diet, smoking, and other factors increase the risk of heart disease. Your doctor has found that you have a chance of having heart disease. You can do lots of things to keep your heart healthy. It may not be easy, but you can change your diet, exercise more, and quit smoking. These steps really work to lower your chance of heart disease. Follow-up care is a key part of your treatment and safety. Be sure to make and go to all appointments, and call your doctor if you are having problems. It's also a good idea to know your test results and keep a list of the medicines you take. How can you care for yourself at home? Diet    Use less salt when you cook and eat. This helps lower your blood pressure. Taste food before salting. Add only a little salt when you think you need it. With time, your taste buds will adjust to less salt.     Eat fewer snack items, fast foods, canned soups, and other high-salt, high-fat, processed foods.     Read food labels and try to avoid saturated and trans fats. They increase your risk of heart disease by raising cholesterol levels.     Limit the amount of solid fat-butter, margarine, and shortening-you eat. Use olive, peanut, or canola oil when you cook. Bake, broil, and steam foods instead of frying them.     Eat a variety of fruit and vegetables every day. Dark green, deep orange, red, or yellow fruits and vegetables are especially good for you. Examples include spinach, carrots, peaches, and berries.     Foods high in fiber can reduce your cholesterol and provide important vitamins and minerals. High-fiber foods include whole-grain cereals and breads, oatmeal, beans, brown rice, citrus fruits, and apples.     Eat lean proteins.  Heart-healthy proteins include seafood, lean meats and poultry, eggs, beans, peas, nuts, seeds, and soy products.     Limit drinks and foods with added sugar. These include candy, desserts, and soda pop. Lifestyle changes    If your doctor recommends it, get more exercise. Walking is a good choice. Bit by bit, increase the amount you walk every day. Try for at least 30 minutes on most days of the week. You also may want to swim, bike, or do other activities.     Do not smoke. If you need help quitting, talk to your doctor about stop-smoking programs and medicines. These can increase your chances of quitting for good. Quitting smoking may be the most important step you can take to protect your heart. It is never too late to quit.     Limit alcohol to 2 drinks a day for men and 1 drink a day for women. Too much alcohol can cause health problems.     Manage other health problems such as diabetes, high blood pressure, and high cholesterol. If you think you may have a problem with alcohol or drug use, talk to your doctor. Medicines    Take your medicines exactly as prescribed. Call your doctor if you think you are having a problem with your medicine.     If your doctor recommends aspirin, take the amount directed each day. Make sure you take aspirin and not another kind of pain reliever, such as acetaminophen (Tylenol). When should you call for help? Call 911 if you have symptoms of a heart attack. These may include:    Chest pain or pressure, or a strange feeling in the chest.     Sweating.     Shortness of breath.     Pain, pressure, or a strange feeling in the back, neck, jaw, or upper belly or in one or both shoulders or arms.     Lightheadedness or sudden weakness.     A fast or irregular heartbeat. After you call 911, the  may tell you to chew 1 adult-strength or 2 to 4 low-dose aspirin. Wait for an ambulance. Do not try to drive yourself.   Watch closely for changes in your health, and be sure to contact your doctor if you have any problems. Where can you learn more? Go to http://www.utpon.com/ and enter F075 to learn more about \"A Healthy Heart: Care Instructions. \"  Current as of: September 7, 2022               Content Version: 13.5  © 2377-7502 Healthwise, Incorporated. Care instructions adapted under license by [facility] ([facility]). If you have questions about a medical condition or this instruction, always ask your healthcare professional. [address] any warranty or liability for your use of this information. Personalized Preventive Plan for Soheila David - 3/8/2023  Medicare offers a range of preventive health benefits. Some of the tests and screenings are paid in full while other may be subject to a deductible, co-insurance, and/or copay. Some of these benefits include a comprehensive review of your medical history including lifestyle, illnesses that may run in your family, and various assessments and screenings as appropriate. After reviewing your medical record and screening and assessments performed today your provider may have ordered immunizations, labs, imaging, and/or referrals for you. A list of these orders (if applicable) as well as your Preventive Care list are included within your After Visit Summary for your review. Other Preventive Recommendations:    A preventive eye exam performed by an eye specialist is recommended every 1-2 years to screen for glaucoma; cataracts, macular degeneration, and other eye disorders. A preventive dental visit is recommended every 6 months. Try to get at least 150 minutes of exercise per week or 10,000 steps per day on a pedometer . Order or download the FREE \"Exercise & Physical Activity: Your Everyday Guide\" from [employer]. Call 9-657.744.8050 or search The [employer] online. You need 7135-9127 mg of calcium and 3226-1810 IU of vitamin D per day.  It is possible to meet your calcium requirement with diet alone, but a vitamin D supplement is usually necessary to meet this goal.  When exposed to the sun, use a sunscreen that protects against both UVA and UVB radiation with an SPF of 30 or greater. Reapply every 2 to 3 hours or after sweating, drying off with a towel, or swimming. Always wear a seat belt when traveling in a car. Always wear a helmet when riding a bicycle or motorcycle.

## 2023-03-09 ENCOUNTER — HOSPITAL ENCOUNTER (OUTPATIENT)
Age: 68
Discharge: HOME | End: 2023-03-09
Payer: MEDICARE

## 2023-03-09 DIAGNOSIS — R10.9 RECURRENT ABDOMINAL PAIN: ICD-10-CM

## 2023-03-09 DIAGNOSIS — Z72.0 TOBACCO USER: ICD-10-CM

## 2023-03-09 DIAGNOSIS — R63.4 WEIGHT LOSS: ICD-10-CM

## 2023-03-09 DIAGNOSIS — R10.32 LLQ ABDOMINAL PAIN: ICD-10-CM

## 2023-03-09 DIAGNOSIS — K66.0 ABDOMINAL ADHESIONS: Chronic | ICD-10-CM

## 2023-03-09 DIAGNOSIS — R19.4 BOWEL HABIT CHANGES: ICD-10-CM

## 2023-03-09 LAB
ALBUMIN SERPL BCG-MCNC: 4.8 G/DL (ref 3.5–5.1)
ALP SERPL-CCNC: 86 U/L (ref 38–126)
ALT SERPL W/O P-5'-P-CCNC: 24 U/L (ref 11–66)
ANION GAP SERPL CALC-SCNC: 14 MEQ/L (ref 8–16)
AST SERPL-CCNC: 26 U/L (ref 5–40)
BASOPHILS ABSOLUTE: 0.1 THOU/MM3 (ref 0–0.1)
BASOPHILS NFR BLD AUTO: 1 %
BILIRUB SERPL-MCNC: 0.5 MG/DL (ref 0.3–1.2)
BUN SERPL-MCNC: 6 MG/DL (ref 7–22)
CALCIUM SERPL-MCNC: 10.1 MG/DL (ref 8.5–10.5)
CHLORIDE SERPL-SCNC: 103 MEQ/L (ref 98–111)
CO2 SERPL-SCNC: 25 MEQ/L (ref 23–33)
CREAT SERPL-MCNC: 0.7 MG/DL (ref 0.4–1.2)
CRP SERPL-MCNC: < 0.3 MG/DL (ref 0–1)
DEPRECATED RDW RBC AUTO: 44.8 FL (ref 35–45)
EOSINOPHIL NFR BLD AUTO: 5.1 %
EOSINOPHILS ABSOLUTE: 0.3 THOU/MM3 (ref 0–0.4)
ERYTHROCYTE [DISTWIDTH] IN BLOOD BY AUTOMATED COUNT: 12.5 % (ref 11.5–14.5)
ERYTHROCYTE [SEDIMENTATION RATE] IN BLOOD BY WESTERGREN METHOD: 7 MM/HR (ref 0–20)
GFR SERPL CREATININE-BSD FRML MDRD: > 60 ML/MIN/1.73M2
GLUCOSE SERPL-MCNC: 94 MG/DL (ref 70–108)
HCT VFR BLD AUTO: 46.5 % (ref 37–47)
HGB BLD-MCNC: 15.4 GM/DL (ref 12–16)
IMM GRANULOCYTES # BLD AUTO: 0.01 THOU/MM3 (ref 0–0.07)
IMM GRANULOCYTES NFR BLD AUTO: 0.2 %
LYMPHOCYTES ABSOLUTE: 2 THOU/MM3 (ref 1–4.8)
LYMPHOCYTES NFR BLD AUTO: 38.1 %
MCH RBC QN AUTO: 32.2 PG (ref 26–33)
MCHC RBC AUTO-ENTMCNC: 33.1 GM/DL (ref 32.2–35.5)
MCV RBC AUTO: 97.3 FL (ref 81–99)
MONOCYTES ABSOLUTE: 0.4 THOU/MM3 (ref 0.4–1.3)
MONOCYTES NFR BLD AUTO: 6.7 %
NEUTROPHILS NFR BLD AUTO: 48.9 %
NRBC BLD AUTO-RTO: 0 /100 WBC
PLATELET # BLD AUTO: 257 THOU/MM3 (ref 130–400)
PMV BLD AUTO: 10.1 FL (ref 9.4–12.4)
POTASSIUM SERPL-SCNC: 4.4 MEQ/L (ref 3.5–5.2)
PROT SERPL-MCNC: 7.3 G/DL (ref 6.1–8)
RBC # BLD AUTO: 4.78 MILL/MM3 (ref 4.2–5.4)
SEGMENTED NEUTROPHILS ABSOLUTE COUNT: 2.6 THOU/MM3 (ref 1.8–7.7)
SODIUM SERPL-SCNC: 142 MEQ/L (ref 135–145)
WBC # BLD AUTO: 5.3 THOU/MM3 (ref 4.8–10.8)

## 2023-03-09 PROCEDURE — 80053 COMPREHEN METABOLIC PANEL: CPT

## 2023-03-09 PROCEDURE — 86140 C-REACTIVE PROTEIN: CPT

## 2023-03-09 PROCEDURE — 85651 RBC SED RATE NONAUTOMATED: CPT

## 2023-03-09 PROCEDURE — 36415 COLL VENOUS BLD VENIPUNCTURE: CPT

## 2023-03-09 PROCEDURE — 85025 COMPLETE CBC W/AUTO DIFF WBC: CPT

## 2023-03-10 ENCOUNTER — TELEPHONE (OUTPATIENT)
Dept: FAMILY MEDICINE CLINIC | Age: 68
End: 2023-03-10

## 2023-03-10 NOTE — RESULT ENCOUNTER NOTE
Please let patient know that her laboratories for inflammatory belly conditions, liver, kidney and glucose are all normal.  White blood cells are normal and there is no signs of anemia. At this point I recommend she work on having smooth and more frequent bowel emptying and trial of the Bentyl for the pain. A follow up is set up for 3 weeks.

## 2023-03-10 NOTE — TELEPHONE ENCOUNTER
----- Message from Chrissie Espinal MD sent at 3/10/2023 12:50 PM EST -----  Please let patient know that her laboratories for inflammatory belly conditions, liver, kidney and glucose are all normal.  White blood cells are normal and there is no signs of anemia.  At this point I recommend she work on having smooth and more frequent bowel emptying and trial of the Bentyl for the pain.  A follow up is set up for 3 weeks.

## 2023-03-27 ENCOUNTER — TELEPHONE (OUTPATIENT)
Dept: FAMILY MEDICINE CLINIC | Age: 68
End: 2023-03-27

## 2023-03-27 DIAGNOSIS — K66.0 ABDOMINAL ADHESIONS: ICD-10-CM

## 2023-03-27 DIAGNOSIS — R19.4 BOWEL HABIT CHANGES: ICD-10-CM

## 2023-03-27 DIAGNOSIS — R63.4 WEIGHT LOSS: ICD-10-CM

## 2023-03-27 DIAGNOSIS — R10.9 RECURRENT ABDOMINAL PAIN: Primary | ICD-10-CM

## 2023-03-27 NOTE — TELEPHONE ENCOUNTER
Patient has called into office with continuing abdominal pain. Bere Likes she was very constipated and in a lot of pain. Today she has woke up with diarrhea. She states she has one particular spot of pain. She is concerned because the last time she was like this they had to have a bowel resection. She would like a CT scan to make sure everything is ok. With her history she is afraid to wait to long.

## 2023-03-28 NOTE — TELEPHONE ENCOUNTER
Scheduled patient for Friday 3/31 1pm. She has been notified to be there at 11:30 to start drinking contrast. She has verbalized an understanding.

## 2023-03-30 NOTE — TELEPHONE ENCOUNTER
The prior CT was denied that was initiated on 3/27. I started a new case but pt shouldn't proceed with the CT until it has been authorized. The provider can help speed up the case by calling Amie @ [phone] case# [id number].  Pt currently on the schedule for the CT tomorrow 3/31

## 2023-04-04 ENCOUNTER — HOSPITAL ENCOUNTER (OUTPATIENT)
Dept: GENERAL RADIOLOGY | Age: 68
Discharge: HOME | End: 2023-04-04
Payer: MEDICARE

## 2023-04-04 ENCOUNTER — HOSPITAL ENCOUNTER (OUTPATIENT)
Age: 68
Discharge: HOME | End: 2023-04-04
Payer: MEDICARE

## 2023-04-04 ENCOUNTER — OFFICE VISIT (OUTPATIENT)
Dept: FAMILY MEDICINE CLINIC | Age: 68
End: 2023-04-04

## 2023-04-04 VITALS
WEIGHT: 127.2 LBS | RESPIRATION RATE: 14 BRPM | HEIGHT: 68 IN | HEART RATE: 54 BPM | BODY MASS INDEX: 19.28 KG/M2 | SYSTOLIC BLOOD PRESSURE: 126 MMHG | OXYGEN SATURATION: 99 % | TEMPERATURE: 96.9 F | DIASTOLIC BLOOD PRESSURE: 80 MMHG

## 2023-04-04 DIAGNOSIS — R10.9 RECURRENT ABDOMINAL PAIN: Primary | ICD-10-CM

## 2023-04-04 DIAGNOSIS — R63.4 WEIGHT LOSS: ICD-10-CM

## 2023-04-04 DIAGNOSIS — R10.9 RECURRENT ABDOMINAL PAIN: ICD-10-CM

## 2023-04-04 DIAGNOSIS — R19.4 BOWEL HABIT CHANGES: ICD-10-CM

## 2023-04-04 DIAGNOSIS — Z87.19 HX OF INTESTINAL OBSTRUCTION: ICD-10-CM

## 2023-04-04 PROCEDURE — 74018 RADEX ABDOMEN 1 VIEW: CPT

## 2023-04-04 ASSESSMENT — PATIENT HEALTH QUESTIONNAIRE - PHQ9
2. FEELING DOWN, DEPRESSED OR HOPELESS: 1
SUM OF ALL RESPONSES TO PHQ QUESTIONS 1-9: 2
SUM OF ALL RESPONSES TO PHQ QUESTIONS 1-9: 2
7. TROUBLE CONCENTRATING ON THINGS, SUCH AS READING THE NEWSPAPER OR WATCHING TELEVISION: 0
8. MOVING OR SPEAKING SO SLOWLY THAT OTHER PEOPLE COULD HAVE NOTICED. OR THE OPPOSITE, BEING SO FIGETY OR RESTLESS THAT YOU HAVE BEEN MOVING AROUND A LOT MORE THAN USUAL: 0
SUM OF ALL RESPONSES TO PHQ QUESTIONS 1-9: 2
9. THOUGHTS THAT YOU WOULD BE BETTER OFF DEAD, OR OF HURTING YOURSELF: 0
5. POOR APPETITE OR OVEREATING: 0
SUM OF ALL RESPONSES TO PHQ9 QUESTIONS 1 & 2: 2
3. TROUBLE FALLING OR STAYING ASLEEP: 0
1. LITTLE INTEREST OR PLEASURE IN DOING THINGS: 1
6. FEELING BAD ABOUT YOURSELF - OR THAT YOU ARE A FAILURE OR HAVE LET YOURSELF OR YOUR FAMILY DOWN: 0
4. FEELING TIRED OR HAVING LITTLE ENERGY: 0
10. IF YOU CHECKED OFF ANY PROBLEMS, HOW DIFFICULT HAVE THESE PROBLEMS MADE IT FOR YOU TO DO YOUR WORK, TAKE CARE OF THINGS AT HOME, OR GET ALONG WITH OTHER PEOPLE: 0
SUM OF ALL RESPONSES TO PHQ QUESTIONS 1-9: 2

## 2023-04-04 NOTE — PROGRESS NOTES
Lu Pressley (:  1955) is a 78 y.o. female,Established patient, here for evaluation of the following chief complaint(s):  Abdominal Pain and Follow-up (Patient states that she feels it is better,but still hurts off and on.)         ASSESSMENT/PLAN:  1. Recurrent abdominal pain  -     XR ABDOMEN (KUB) (SINGLE AP VIEW); Future  2. Bowel habit changes  -     XR ABDOMEN (KUB) (SINGLE AP VIEW); Future  3. Weight loss  -     XR ABDOMEN (KUB) (SINGLE AP VIEW); Future  4. Hx of intestinal obstruction  -     XR ABDOMEN (KUB) (SINGLE AP VIEW); Future    Would prefer CT scan ASAP but if not able to then at least get KUB. Approval noticed received by patient  Abdominal pain present, still losing weight    Return for 4-6 week. Subjective   SUBJECTIVE/OBJECTIVE:  HPI  Abdominal pain might be better, last two days only. Now ongoing for 2 months. Was able to drink more fluids and Started eating a few more solids just in last two days. H/o bowel obstruction and adhesions. Abdominal pain is cramping and bloating. Left lower side sometimes feels like pulsing. Denies N/V but doesn't have an appetite. Weight loss continues. 138 lbs 2022, now down to 127 lbs. Was 128 lbs last visit. Bentyl too strong at times. Blurry vision. [employer] denied CT scan from 3/27 -- approved 3/31. Labs 3/9/2023 okay. Phone call received by patient -- stated it was approved     Wt Readings from Last 3 Encounters:   23 127 lb 3.2 oz (57.7 kg)   23 128 lb 14.4 oz (58.5 kg)   22 138 lb (62.6 kg)   Review of Systems     No fever/chills. No SOB/CP. No rashes. Objective   Physical Exam     Gen: NAD, AAO x 3, coherent, pleasant  CTAB. RRR no murmur  Abd: soft but has RUQ and LLQ tenderness. Bowel sounds are quiet. This office note may have been at least partially dictated. Effort was made to review for errors but some may have been missed.  Please contact Chente Rodgers of note for clarification if

## 2023-04-04 NOTE — PATIENT INSTRUCTIONS
Staff please follow up on authorization below --  Patient received phone call of approval.  If so, please schedule --       Julian Friedman MA     [facility]     8:50 AM  Note     The prior CT was denied that was initiated on 3/27. I started a new case but pt shouldn't proceed with the CT until it has been authorized. The provider can help speed up the case by calling Amie @ [phone] case# [id number].  Pt currently on the schedule for the CT tomorrow 3/31

## 2023-04-05 ENCOUNTER — TELEPHONE (OUTPATIENT)
Dept: FAMILY MEDICINE CLINIC | Age: 68
End: 2023-04-05

## 2023-04-05 NOTE — TELEPHONE ENCOUNTER
----- Message from Alan Short MD sent at 4/5/2023 10:02 AM EDT -----  Do we know if CT for 4/10 is approved by insurance?

## 2023-04-05 NOTE — TELEPHONE ENCOUNTER
Yes it was approved by insurance the second time. There was a denial the first time by [facility], then refilled by [facility].

## 2023-04-17 ENCOUNTER — TELEPHONE (OUTPATIENT)
Dept: FAMILY MEDICINE CLINIC | Age: 68
End: 2023-04-17

## 2023-04-17 RX ORDER — BUSPIRONE HYDROCHLORIDE 5 MG/1
5 TABLET ORAL 2 TIMES DAILY
Qty: 60 TABLET | Refills: 0 | Status: SHIPPED | OUTPATIENT
Start: 2023-04-17 | End: 2023-05-17

## 2023-04-17 NOTE — TELEPHONE ENCOUNTER
May discontinue zoloft. Recommend trial of buspar 5 mg BID, but can start with half tablet twice a day for a week and then increase to normal dose.      Future Appointments   Date Time Provider Mika Buckley   5/9/2023 12:20 PM Pam Nazario MD [facility] [place] [address] [facility]

## 2023-04-17 NOTE — TELEPHONE ENCOUNTER
Patient called in stating that she cannot take the Zoloft, makes her feel out of it, in another world. What should she do. Please let her know.

## 2023-04-18 NOTE — TELEPHONE ENCOUNTER
Patient informed and verbalized understanding. Patient will discontinue the zoloft. Patient will start Buspar 5mg 1/2 tab PO BID x 1 week, then will increase to 5mg PO BID.

## 2023-05-09 ENCOUNTER — OFFICE VISIT (OUTPATIENT)
Dept: FAMILY MEDICINE CLINIC | Age: 68
End: 2023-05-09

## 2023-05-09 VITALS
SYSTOLIC BLOOD PRESSURE: 132 MMHG | DIASTOLIC BLOOD PRESSURE: 78 MMHG | BODY MASS INDEX: 19.25 KG/M2 | OXYGEN SATURATION: 100 % | HEIGHT: 68 IN | HEART RATE: 70 BPM | WEIGHT: 127 LBS | TEMPERATURE: 97.3 F

## 2023-05-09 DIAGNOSIS — R10.9 RECURRENT ABDOMINAL PAIN: Primary | ICD-10-CM

## 2023-05-09 DIAGNOSIS — Z72.0 TOBACCO USER: ICD-10-CM

## 2023-05-09 DIAGNOSIS — K66.0 ABDOMINAL ADHESIONS: Chronic | ICD-10-CM

## 2023-05-09 DIAGNOSIS — F43.9 SITUATIONAL STRESS: ICD-10-CM

## 2023-05-09 DIAGNOSIS — G89.29 CHRONIC RIGHT-SIDED LOW BACK PAIN WITHOUT SCIATICA: ICD-10-CM

## 2023-05-09 DIAGNOSIS — M54.50 CHRONIC RIGHT-SIDED LOW BACK PAIN WITHOUT SCIATICA: ICD-10-CM

## 2023-05-09 RX ORDER — BUSPIRONE HYDROCHLORIDE 5 MG/1
5 TABLET ORAL 2 TIMES DAILY
Qty: 60 TABLET | Refills: 4 | Status: SHIPPED | OUTPATIENT
Start: 2023-05-09 | End: 2023-06-08

## 2023-05-09 NOTE — PROGRESS NOTES
Didi Sterling (:  1955) is a 80 y.o. female,Established patient, here for evaluation of the following chief complaint(s):  Follow-up, Abdominal Pain, and Anxiety         ASSESSMENT/PLAN:  1. Recurrent abdominal pain  -     busPIRone (BUSPAR) 5 MG tablet; Take 1 tablet by mouth 2 times daily, Disp-60 tablet, R-4Normal  2. Abdominal adhesions  3. Chronic right-sided low back pain without sciatica  4. Tobacco user  5. Situational stress  -     busPIRone (BUSPAR) 5 MG tablet; Take 1 tablet by mouth 2 times daily, Disp-60 tablet, R-4Normal    Work up benign  Patient feeling better and weight stable. She will continue monitoring. 2 cigarettes a day -- decreasing in use. Advised stopping for improving her health. Gave additional stretches    Return in about 6 months (around 2023). Subjective   SUBJECTIVE/OBJECTIVE:  HPI    Patient returns to discuss her recurrent abdominal pain. Is actually much improved. She had CT scan due to months of worsening pain with intermittent flareups. She has a history of abdominal adhesions and has been told by the surgeon to be cautious with her bowel function. After much pondering and having CT scan and laboratories that were benign, patient contemplated on stress triggers as it has been pretty high lately. Sister conflict worse in last 2.5 years. Patient helped raise this sister and unfortunately sister has had her own stresses but the way she manages this by lashing out at family especially this patient. Patient basically took care of sibligns when mother left. Every holiday lately has been stressful as the family tries to get together. She decided to not try to solve it but to walk away from it as it is affecting her health. Patient had quite a bit of weight loss which was concerning. Since that time the patient has been able to eat better and her bowels are functioning better. Her weight has stabilized and her fiancé sees a calm about her.

## 2023-05-23 ENCOUNTER — TELEPHONE (OUTPATIENT)
Dept: FAMILY MEDICINE CLINIC | Age: 68
End: 2023-05-23

## 2023-05-23 DIAGNOSIS — R11.0 NAUSEA: Primary | ICD-10-CM

## 2023-05-23 RX ORDER — PROMETHAZINE HYDROCHLORIDE 12.5 MG/1
12.5 TABLET ORAL 3 TIMES DAILY PRN
Qty: 12 TABLET | Refills: 0 | Status: SHIPPED | OUTPATIENT
Start: 2023-05-23 | End: 2023-05-30

## 2023-05-23 NOTE — TELEPHONE ENCOUNTER
Patient called saying her symptoms started Friday with headache, nausea, diarrhea, and extreme fatigue. She states that she is still very nauseas today and would like to know if you could call her in something or is there something she can do to help this. If anything is called in please send to [facility] in [place], please advise, thank you.

## 2023-05-24 ENCOUNTER — OFFICE VISIT (OUTPATIENT)
Dept: FAMILY MEDICINE CLINIC | Age: 68
End: 2023-05-24
Payer: MEDICARE

## 2023-05-24 VITALS
OXYGEN SATURATION: 99 % | RESPIRATION RATE: 14 BRPM | WEIGHT: 125.8 LBS | BODY MASS INDEX: 19.07 KG/M2 | HEART RATE: 74 BPM | HEIGHT: 68 IN | TEMPERATURE: 97.7 F | SYSTOLIC BLOOD PRESSURE: 118 MMHG | DIASTOLIC BLOOD PRESSURE: 78 MMHG

## 2023-05-24 DIAGNOSIS — M79.7 FIBROMYALGIA: Chronic | ICD-10-CM

## 2023-05-24 DIAGNOSIS — M54.12 CERVICAL RADICULOPATHY: ICD-10-CM

## 2023-05-24 DIAGNOSIS — K52.9 ACUTE GASTROENTERITIS: Primary | ICD-10-CM

## 2023-05-24 DIAGNOSIS — R11.0 NAUSEA: ICD-10-CM

## 2023-05-24 PROCEDURE — 99214 OFFICE O/P EST MOD 30 MIN: CPT | Performed by: FAMILY MEDICINE

## 2023-05-24 PROCEDURE — 1123F ACP DISCUSS/DSCN MKR DOCD: CPT | Performed by: FAMILY MEDICINE

## 2023-05-24 RX ORDER — PREDNISONE 20 MG/1
20 TABLET ORAL 2 TIMES DAILY
Qty: 10 TABLET | Refills: 0 | Status: SHIPPED | OUTPATIENT
Start: 2023-05-24 | End: 2023-05-29

## 2023-05-24 NOTE — PROGRESS NOTES
Rolando Melvin (:  1955) is a 80 y.o. female,Established patient, here for evaluation of the following chief complaint(s):  Illness (Started Friday has had nausea, headaches and body aches. C/O sharp stabbing pain from neck going down left arm. Pain can get up to a 6 on pain scale.)         ASSESSMENT/PLAN:  1. Acute gastroenteritis  2. Nausea  3. Cervical radiculopathy  -     predniSONE (DELTASONE) 20 MG tablet; Take 1 tablet by mouth 2 times daily for 5 days, Disp-10 tablet, R-0Normal    Gastroenteritis is a little bit better and she responded well to Phenergan. She is cautious with any antidiarrheals due to her adhesions and abdominal pain troubles. She will continue with a brat diet and hydrating. If nausea continues, I recommend that she have evaluation through EGD. She would prefer Dr. Brian Garcia to do scopes if needed. CT of the abdomen and pelvis along with laboratory was recently done    Prednisone will be done for the radiculopathy but patient will make sure that her nausea is well controlled first.  She may be holding onto prednisone for about 2 to 3 days and then use it to help the neck issues    Self - massage with a tennis ball or golf ball to the back technique was discussed. She likes that idea and will pursue it. We will do a short term follow-up because patient continues to have weight loss. Consider other etiologies for weight loss if abdominal issues are better. Return in about 4 weeks (around 2023) for weight check, abdominal function. Subjective   SUBJECTIVE/OBJECTIVE:  HPI  On Thursday, was with friends and Drank some alcohol. Not much but usually doesn't do this. Started with some nausea and belly discomfort. Then Saturday -- nausea worsened. No belly pain but just upset stomach and loose stool.  Too mylanta  Children had stomach bag  We sent phenergan  Diarrhea -- loose mostly, yesterday had more abdominal pain     Neck pain with left sided

## 2023-06-07 ENCOUNTER — TELEPHONE (OUTPATIENT)
Dept: FAMILY MEDICINE CLINIC | Age: 68
End: 2023-06-07

## 2023-06-07 NOTE — TELEPHONE ENCOUNTER
Yes, she can stop prednisone or decrease dose to 1 pill per day and see if sleep is better. Please advise patient.   Caroll Simmonds, MD  (Covering for Dr. Yamilka Barr)

## 2023-06-07 NOTE — TELEPHONE ENCOUNTER
Patient was put on prednisone by the provider and she is unable to sleep so she is wanting to know if she can stop taking it?

## 2023-06-22 ENCOUNTER — OFFICE VISIT (OUTPATIENT)
Dept: FAMILY MEDICINE CLINIC | Age: 68
End: 2023-06-22
Payer: MEDICARE

## 2023-06-22 VITALS
BODY MASS INDEX: 19.61 KG/M2 | OXYGEN SATURATION: 99 % | WEIGHT: 129.4 LBS | SYSTOLIC BLOOD PRESSURE: 110 MMHG | HEIGHT: 68 IN | DIASTOLIC BLOOD PRESSURE: 66 MMHG | HEART RATE: 75 BPM | TEMPERATURE: 97.5 F | RESPIRATION RATE: 16 BRPM

## 2023-06-22 DIAGNOSIS — R05.1 ACUTE COUGH: ICD-10-CM

## 2023-06-22 DIAGNOSIS — K58.8 OTHER IRRITABLE BOWEL SYNDROME: ICD-10-CM

## 2023-06-22 DIAGNOSIS — R06.02 SOB (SHORTNESS OF BREATH) ON EXERTION: Primary | ICD-10-CM

## 2023-06-22 DIAGNOSIS — R10.9 RECURRENT ABDOMINAL PAIN: ICD-10-CM

## 2023-06-22 DIAGNOSIS — K66.0 ABDOMINAL ADHESIONS: Chronic | ICD-10-CM

## 2023-06-22 PROCEDURE — 99214 OFFICE O/P EST MOD 30 MIN: CPT | Performed by: FAMILY MEDICINE

## 2023-06-22 PROCEDURE — 1123F ACP DISCUSS/DSCN MKR DOCD: CPT | Performed by: FAMILY MEDICINE

## 2023-06-22 RX ORDER — AZITHROMYCIN 250 MG/1
TABLET, FILM COATED ORAL
Qty: 6 TABLET | Refills: 0 | Status: SHIPPED | OUTPATIENT
Start: 2023-06-22

## 2023-06-22 ASSESSMENT — ENCOUNTER SYMPTOMS
ABDOMINAL PAIN: 0
COUGH: 1
SHORTNESS OF BREATH: 1

## 2023-06-22 NOTE — PROGRESS NOTES
Diane Pope (:  1955) is a 80 y.o. female,Established patient, here for evaluation of the following chief complaint(s):  Abdominal Pain (F/U for stomach issues. States stomach issues have much improved. Still has diarrhea and constipation but denies abdominal pain, nausea or vomiting.  ), Weight Loss (F/U for weight loss. Patients weight has increased sense last visit. ), Mole (C/O mole left upper quadrant of abdomin. Noticed about 6 months ago and has been increasing in size.  ), and Shortness of Breath (C/O intermittent SOB. Happens mainly outside and will have coughing fits and sometime productive with Phlegm.    )         ASSESSMENT/PLAN:  1. SOB (shortness of breath) on exertion  -     azithromycin (ZITHROMAX Z-NAYANA) 250 MG tablet; Take two (2) tablets by mouth on day 1, then take one (1) tablet by mouth daily for four (4) days. , Disp-6 tablet, R-0Normal  2. Abdominal adhesions  3. Recurrent abdominal pain  4. Other irritable bowel syndrome  5. Acute cough  -     azithromycin (ZITHROMAX Z-NAYANA) 250 MG tablet; Take two (2) tablets by mouth on day 1, then take one (1) tablet by mouth daily for four (4) days. , Disp-6 tablet, R-0Normal    3 weeks plus of ongoing cough that is becoming more productive and patient with history of smoking. We will treat with Z-Nayana. She will make sure to drink plenty of water may consider some Mucinex. Concern is for an acute bronchitis. Abdominal issues are controlled and patient is back to a better baseline  If her upper respiratory and some of the postnasal drip that she discussed starts worsening again, we discussed using a different type of nasal spray than the Flonase that she tried over-the-counter. She prefers not adding tablets. Return for 11/15/23. Subjective   SUBJECTIVE/OBJECTIVE:  HPI  Patient following up from her belly issues.   She has had several months of recurrent abdominal pain in the setting of abdominal adhesions and increased

## 2023-06-26 DIAGNOSIS — R10.84 GENERALIZED ABDOMINAL PAIN: Primary | ICD-10-CM

## 2023-06-26 RX ORDER — HYDROCODONE BITARTRATE AND ACETAMINOPHEN 5; 325 MG/1; MG/1
1 TABLET ORAL EVERY 6 HOURS PRN
Qty: 28 TABLET | Refills: 0 | Status: SHIPPED | OUTPATIENT
Start: 2023-06-26 | End: 2023-07-03

## 2023-07-14 ENCOUNTER — TELEPHONE (OUTPATIENT)
Dept: FAMILY MEDICINE CLINIC | Age: 68
End: 2023-07-14

## 2023-07-14 DIAGNOSIS — M25.551 RIGHT HIP PAIN: Primary | ICD-10-CM

## 2023-07-14 DIAGNOSIS — F41.9 ANXIETY: ICD-10-CM

## 2023-07-14 RX ORDER — BUSPIRONE HYDROCHLORIDE 10 MG/1
10 TABLET ORAL 2 TIMES DAILY
Qty: 60 TABLET | Refills: 1 | Status: SHIPPED | OUTPATIENT
Start: 2023-07-14 | End: 2023-09-12

## 2023-07-17 NOTE — TELEPHONE ENCOUNTER
Patient informed and reports she has been taking Buspar 10mg  twice daily. Patient is questioning if she can take 15 mg twice daily. Patient reports she doesn't feel like it is doing anything or if she has a tolerance to it? Patient given Central Scheduling # [phone], and will call to schedule the right hip xray.

## 2023-07-18 ENCOUNTER — HOSPITAL ENCOUNTER (OUTPATIENT)
Age: 68
Discharge: HOME | End: 2023-07-18
Payer: MEDICARE

## 2023-07-18 ENCOUNTER — HOSPITAL ENCOUNTER (OUTPATIENT)
Dept: GENERAL RADIOLOGY | Age: 68
Discharge: HOME | End: 2023-07-18
Payer: MEDICARE

## 2023-07-18 ENCOUNTER — TELEPHONE (OUTPATIENT)
Dept: FAMILY MEDICINE CLINIC | Age: 68
End: 2023-07-18

## 2023-07-18 DIAGNOSIS — M25.551 RIGHT HIP PAIN: ICD-10-CM

## 2023-07-18 PROCEDURE — 73502 X-RAY EXAM HIP UNI 2-3 VIEWS: CPT

## 2023-07-18 NOTE — TELEPHONE ENCOUNTER
Buspar can work slowly in some patients. If she is needing something to act more quickly, then hydroxyzine is an option. She may take buspar 10 mg TID. I recommended an OV to follow up on the anxiety since flaring up, please help her arrange that.      Future Appointments   Date Time Provider [address]   11/15/2023  8:00 AM Oscar Rocha MD [facility] [place]

## 2023-07-18 NOTE — RESULT ENCOUNTER NOTE
Please let patient know that her right hip xray show mild arthritis. Such is usually treated with medication, strengthening/stretching exercises, some rest.  If not improving then orthopaedic evaluation is helpful.

## 2023-07-18 NOTE — TELEPHONE ENCOUNTER
----- Message from Efra Calabrese MD sent at 7/18/2023  3:06 PM EDT -----  Please let patient know that her right hip xray show mild arthritis. Such is usually treated with medication, strengthening/stretching exercises, some rest.  If not improving then orthopaedic evaluation is helpful.

## 2023-07-18 NOTE — TELEPHONE ENCOUNTER
Patient informed and verbalized understanding. Patient will increase Buspar 10mg TID and scheduled appt. For 7/25/23 at 0900.

## 2023-07-25 ENCOUNTER — OFFICE VISIT (OUTPATIENT)
Dept: FAMILY MEDICINE CLINIC | Age: 68
End: 2023-07-25

## 2023-07-25 VITALS
WEIGHT: 130 LBS | HEIGHT: 68 IN | DIASTOLIC BLOOD PRESSURE: 70 MMHG | OXYGEN SATURATION: 97 % | TEMPERATURE: 97.1 F | BODY MASS INDEX: 19.7 KG/M2 | HEART RATE: 74 BPM | SYSTOLIC BLOOD PRESSURE: 122 MMHG

## 2023-07-25 DIAGNOSIS — M25.551 RIGHT HIP PAIN: ICD-10-CM

## 2023-07-25 DIAGNOSIS — M54.31 SCIATIC PAIN, RIGHT: ICD-10-CM

## 2023-07-25 DIAGNOSIS — F41.9 ANXIETY: Primary | ICD-10-CM

## 2023-07-25 PROBLEM — F60.7 DEPENDENT PERSONALITY DISORDER (HCC): Status: RESOLVED | Noted: 2017-01-26 | Resolved: 2023-07-25

## 2023-07-25 PROBLEM — F60.7 DEPENDENT PERSONALITY DISORDER (HCC): Status: ACTIVE | Noted: 2017-01-26

## 2023-07-25 RX ORDER — METHYLPREDNISOLONE 4 MG/1
TABLET ORAL
Qty: 21 TABLET | Refills: 0 | Status: SHIPPED | OUTPATIENT
Start: 2023-07-25 | End: 2023-07-31

## 2023-07-25 RX ORDER — BUSPIRONE HYDROCHLORIDE 15 MG/1
15 TABLET ORAL 2 TIMES DAILY
Qty: 180 TABLET | Refills: 1 | Status: SHIPPED | OUTPATIENT
Start: 2023-07-25 | End: 2024-01-21

## 2023-07-25 NOTE — PROGRESS NOTES
Aamir Troncoso (:  1955) is a 80 y.o. female,Established patient, here for evaluation of the following chief complaint(s): Anxiety (Would like to see if 1.5 tablets of Buspar would work for anxiety. Having a hard time with afternoon dose.)         ASSESSMENT/PLAN:  1. CAMILLE (generalized anxiety disorder)  2. Anxiety  -     busPIRone (BUSPAR) 15 MG tablet; Take 15 mg by mouth 2 times daily, Disp-180 tablet, R-1Normal  3. Right hip pain  -     ROB - Jessi Klein MD, Orthopedic Surgery, [place]  -     methylPREDNISolone (MEDROL DOSEPACK) 4 MG tablet; Take by mouth as per instructions, Disp-21 tablet, R-0Normal  4. Sciatic pain, right  -     ROB - Jessi Klein MD, Orthopedic Surgery, [place]  -     methylPREDNISolone (MEDROL DOSEPACK) 4 MG tablet; Take by mouth as per instructions, Disp-21 tablet, R-0Normal    Counseling given here. She appears to be well aware of anxiety. Using gratefulness and prayer to manage symptoms as well. Hopefully medrol will be tolerated and will help until she can see ortho. Return for 11/15 as planned . Subjective   SUBJECTIVE/OBJECTIVE:  HPI  Anxiety -- Increased stress has increased her anxiety. She has called in a couple of times since her  visit. She denies depression. Decisions to be made. Transitions happening. Home is falling apart and Son would like to buy it. However needs to move. A lot of changes. She's looking for a small house. She is a hoarder. She will want to donate. Buspar 1.5 tab 10 mg BID doing well. Abdominal issue seem to be okay minus days that she eats more from her garden. This is impressive due to IBS + adhesion issues seem to flare up with stress. She is acknowledging stress better now. Eating better and gained some weight. Right hip pain, no back pain. Pain is moderately severe. She cannot get her work done now. Worsening over the last few months. She tries to stretch but not working.    Prednison not

## 2023-07-28 PROBLEM — M25.551 RIGHT HIP PAIN: Status: ACTIVE | Noted: 2023-07-28

## 2023-07-28 PROBLEM — F41.9 ANXIETY: Status: ACTIVE | Noted: 2023-07-28

## 2023-08-17 ENCOUNTER — HOSPITAL ENCOUNTER (OUTPATIENT)
Dept: MRI IMAGING | Age: 68
Discharge: HOME | End: 2023-08-17
Payer: MEDICARE

## 2023-08-17 DIAGNOSIS — M25.551 RIGHT HIP PAIN: ICD-10-CM

## 2023-08-17 PROCEDURE — 73721 MRI JNT OF LWR EXTRE W/O DYE: CPT

## 2023-08-31 ENCOUNTER — HOSPITAL ENCOUNTER (OUTPATIENT)
Dept: INTERVENTIONAL RADIOLOGY/VASCULAR | Age: 68
Discharge: HOME | End: 2023-08-31
Payer: MEDICARE

## 2023-08-31 DIAGNOSIS — R52 PAIN: ICD-10-CM

## 2023-08-31 PROCEDURE — 2709999900 IR FLUORO GUIDED NEEDLE PLACEMENT

## 2023-08-31 PROCEDURE — 77002 NEEDLE LOCALIZATION BY XRAY: CPT

## 2023-08-31 PROCEDURE — 6360000004 HC RX CONTRAST MEDICATION: Performed by: RADIOLOGY

## 2023-08-31 PROCEDURE — 20610 DRAIN/INJ JOINT/BURSA W/O US: CPT

## 2023-08-31 PROCEDURE — 6360000002 HC RX W HCPCS: Performed by: RADIOLOGY

## 2023-08-31 RX ORDER — METHYLPREDNISOLONE ACETATE 80 MG/ML
80 INJECTION, SUSPENSION INTRA-ARTICULAR; INTRALESIONAL; INTRAMUSCULAR; SOFT TISSUE ONCE
Status: COMPLETED | OUTPATIENT
Start: 2023-08-31 | End: 2023-08-31

## 2023-08-31 RX ORDER — BUPIVACAINE HYDROCHLORIDE 5 MG/ML
10 INJECTION, SOLUTION EPIDURAL; INTRACAUDAL ONCE
Status: COMPLETED | OUTPATIENT
Start: 2023-08-31 | End: 2023-08-31

## 2023-08-31 RX ADMIN — BUPIVACAINE HYDROCHLORIDE 3 ML: 5 INJECTION, SOLUTION EPIDURAL; INTRACAUDAL; PERINEURAL at 14:27

## 2023-08-31 RX ADMIN — METHYLPREDNISOLONE ACETATE 80 MG: 80 INJECTION, SUSPENSION INTRA-ARTICULAR; INTRALESIONAL; INTRAMUSCULAR; SOFT TISSUE at 14:27

## 2023-08-31 RX ADMIN — IOPAMIDOL 1 ML: 408 INJECTION, SOLUTION INTRATHECAL at 14:26

## 2023-08-31 ASSESSMENT — PAIN DESCRIPTION - DIRECTION
RADIATING_TOWARDS: RIGHT GROIN AND LEG
RADIATING_TOWARDS: RIGHT GROIN AND LEG

## 2023-08-31 ASSESSMENT — PAIN - FUNCTIONAL ASSESSMENT
PAIN_FUNCTIONAL_ASSESSMENT: PREVENTS OR INTERFERES SOME ACTIVE ACTIVITIES AND ADLS
PAIN_FUNCTIONAL_ASSESSMENT: PREVENTS OR INTERFERES SOME ACTIVE ACTIVITIES AND ADLS

## 2023-08-31 ASSESSMENT — PAIN DESCRIPTION - FREQUENCY
FREQUENCY: CONTINUOUS
FREQUENCY: CONTINUOUS

## 2023-08-31 ASSESSMENT — PAIN DESCRIPTION - ORIENTATION
ORIENTATION: RIGHT
ORIENTATION: RIGHT

## 2023-08-31 ASSESSMENT — PAIN SCALES - GENERAL
PAINLEVEL_OUTOF10: 2
PAINLEVEL_OUTOF10: 5

## 2023-08-31 ASSESSMENT — PAIN DESCRIPTION - LOCATION
LOCATION: HIP
LOCATION: HIP

## 2023-08-31 ASSESSMENT — PAIN DESCRIPTION - ONSET
ONSET: ON-GOING
ONSET: ON-GOING

## 2023-08-31 ASSESSMENT — PAIN DESCRIPTION - PAIN TYPE
TYPE: CHRONIC PAIN
TYPE: CHRONIC PAIN

## 2023-08-31 ASSESSMENT — PAIN DESCRIPTION - DESCRIPTORS
DESCRIPTORS: ACHING;DISCOMFORT;PRESSURE
DESCRIPTORS: ACHING;DISCOMFORT

## 2023-08-31 NOTE — H&P
Formulation and discussion of sedation / procedure plans, risks, benefits, side effects and alternatives with patient and/or responsible adult completed.     Electronically signed by Celina Kan MD on 8/31/2023 at 2:22 PM

## 2023-08-31 NOTE — PROGRESS NOTES
1412 Pt arrived to special procedure room 1 for right hip injection. Significant other remains in waiting room. 1413 Procedure explained using teach back method. Pt states understanding. 1 Dr Loli Bourgeois into assess patient and explain procedure. This procedure has been fully reviewed with the patient and written informed consent has been obtained. 1418 Patient assisted to table in supine position. Comfort ensured. 1424 Pre-procedure images obtained. 1426 Procedure begins. 1428 Procedure complete. Patient assisted to seated position. Comfort ensured. 1430 Patient ambulated to discharge lobby in stable condition. Significant other at side.

## 2023-09-06 DIAGNOSIS — F51.01 PRIMARY INSOMNIA: ICD-10-CM

## 2023-09-06 DIAGNOSIS — M79.7 FIBROMYALGIA: Chronic | ICD-10-CM

## 2023-09-06 DIAGNOSIS — F41.1 GAD (GENERALIZED ANXIETY DISORDER): Chronic | ICD-10-CM

## 2023-09-07 RX ORDER — DIAZEPAM 5 MG/1
5 TABLET ORAL NIGHTLY PRN
Qty: 30 TABLET | Refills: 3 | Status: SHIPPED | OUTPATIENT
Start: 2023-09-07 | End: 2023-10-07

## 2023-09-07 NOTE — TELEPHONE ENCOUNTER
Kane Boland called requesting a refill on the following medications:  Requested Prescriptions     Pending Prescriptions Disp Refills    diazePAM (VALIUM) 5 MG tablet 30 tablet 5     Sig: Take 1 tablet by mouth nightly as needed for Anxiety for up to 30 days.        Date of last visit: 7/25/2023  Date of next visit (if applicable):11/15/2023  Date of last refill: 3/8/2023  Pharmacy Name: [address] [place] [address]     Rx pending 30/5    Thanks,  Jace Viera LPN

## 2023-09-14 DIAGNOSIS — R10.84 GENERALIZED ABDOMINAL PAIN: Primary | ICD-10-CM

## 2023-09-14 RX ORDER — HYDROCODONE BITARTRATE AND ACETAMINOPHEN 5; 325 MG/1; MG/1
1 TABLET ORAL EVERY 6 HOURS PRN
Qty: 28 TABLET | Refills: 0 | Status: SHIPPED | OUTPATIENT
Start: 2023-09-14 | End: 2023-09-21

## 2023-10-04 ENCOUNTER — HOSPITAL ENCOUNTER (OUTPATIENT)
Dept: PHYSICAL THERAPY | Age: 68
Setting detail: THERAPIES SERIES
Discharge: HOME | End: 2023-10-04
Payer: MEDICARE

## 2023-10-04 PROCEDURE — 97162 PT EVAL MOD COMPLEX 30 MIN: CPT

## 2023-10-04 PROCEDURE — 97110 THERAPEUTIC EXERCISES: CPT

## 2023-10-04 NOTE — PROGRESS NOTES
Structures/Functions/Activity Limitations: impaired balance, impaired ROM, impaired strength, pain, and abnormal gait  Prognosis: good    GOALS:  Patient Goal: to get my pain to lessen    Short Term Goals:  Time Frame: deferred to LTG's    Long Term Goals:  Time Frame: 5 week  Increase AROM R hip flexion 60, abduction 20, knee 0- 125 degrees to allow patient to get R leg in/out of bed without using arms  Increase strength R LE to 4-/5 to allow patient to walk x 20 minutes at store with decreased pain to 4/10  I  with HEP to improve Tinetti score 22/28 to allow patient to walk without device with no LOB    Patient Education:   [x]  HEP/Education Completed: Plan of Care, Goals, handout for above exercises  [employer] Access Code:  []  No new Education completed  []  Reviewed Prior HEP      []  Patient verbalized and/or demonstrated understanding of education provided. []  Patient unable to verbalize and/or demonstrate understanding of education provided. Will continue education. [x]  Barriers to learning: handout needed    PLAN:  Treatment Recommendations: Strengthening, Range of Motion, Balance Training, Gait Training, Stair Training, Home Exercise Program, Patient Education, and Modalities    [x]  Plan of care initiated. Plan to see patient 3 times per week for 5 weeks to address the treatment planned outlined above.   []  Continue with current plan of care  []  Modify plan of care as follows:    []  Hold pending physician visit  []  Discharge    Time In 925   Time Out 1015   Timed Code Minutes: 25 min   Total Treatment Time: 45 min       Electronically Signed by: Austin Hernandez PT

## 2023-10-05 ENCOUNTER — HOSPITAL ENCOUNTER (OUTPATIENT)
Dept: INTERVENTIONAL RADIOLOGY/VASCULAR | Age: 68
Discharge: HOME | End: 2023-10-05
Payer: MEDICARE

## 2023-10-05 DIAGNOSIS — M16.11 PRIMARY OSTEOARTHRITIS OF RIGHT HIP: ICD-10-CM

## 2023-10-05 PROCEDURE — 6360000002 HC RX W HCPCS: Performed by: RADIOLOGY

## 2023-10-05 PROCEDURE — 6360000004 HC RX CONTRAST MEDICATION: Performed by: RADIOLOGY

## 2023-10-05 PROCEDURE — 77002 NEEDLE LOCALIZATION BY XRAY: CPT

## 2023-10-05 PROCEDURE — 2709999900 IR INJ ARTHROGRAM HIP RIGHT

## 2023-10-05 PROCEDURE — 20610 DRAIN/INJ JOINT/BURSA W/O US: CPT

## 2023-10-05 RX ORDER — METHYLPREDNISOLONE ACETATE 80 MG/ML
INJECTION, SUSPENSION INTRA-ARTICULAR; INTRALESIONAL; INTRAMUSCULAR; SOFT TISSUE PRN
Status: COMPLETED | OUTPATIENT
Start: 2023-10-05 | End: 2023-10-05

## 2023-10-05 RX ORDER — IOPAMIDOL 408 MG/ML
INJECTION, SOLUTION INTRATHECAL PRN
Status: COMPLETED | OUTPATIENT
Start: 2023-10-05 | End: 2023-10-05

## 2023-10-05 RX ORDER — BUPIVACAINE HYDROCHLORIDE 5 MG/ML
INJECTION, SOLUTION EPIDURAL; INTRACAUDAL PRN
Status: COMPLETED | OUTPATIENT
Start: 2023-10-05 | End: 2023-10-05

## 2023-10-05 RX ADMIN — BUPIVACAINE HYDROCHLORIDE 3 ML: 5 INJECTION, SOLUTION EPIDURAL; INTRACAUDAL at 09:52

## 2023-10-05 RX ADMIN — METHYLPREDNISOLONE ACETATE 80 MG: 80 INJECTION, SUSPENSION INTRA-ARTICULAR; INTRALESIONAL; INTRAMUSCULAR; SOFT TISSUE at 09:52

## 2023-10-05 RX ADMIN — IOPAMIDOL 1 ML: 408 INJECTION, SOLUTION INTRATHECAL at 09:52

## 2023-10-05 NOTE — OP NOTE
Department of Radiology  Post Procedure Progress Note    Pre-Procedure Diagnosis:  Hip pain    Procedure Performed:  Hip joint block/steroid injection      Anesthesia: local     Findings: successful    Immediate Complications:  None    Estimated Blood Loss: minimal    SEE DICTATED PROCEDURE NOTE FOR COMPLETE DETAILS.     Electronically signed by Kristen Lezama MD on 10/5/2023 at 9:53 AM

## 2023-10-05 NOTE — H&P
Formulation and discussion of sedation / procedure plans, risks, benefits, side effects and alternatives with patient and/or responsible adult completed.     Electronically signed by Kristen Lezama MD on 10/5/2023 at 9:53 AM

## 2023-10-05 NOTE — PROGRESS NOTES
0944 Pt arrived in IR for right hip injection. 2046 Procedure reviewed with pt and pt agreed to site of procedure and procedure to be performed today. 0948 Pt positioned supine on table  0951 Procedure started with Dr. Claudia Guallpa  6616 Procedure complete. Pt tolerated well. Bandaid to right hip puncture site. 0955 Pt released in satisfactory condition, ambulatory. Skin natural for race, warm, dry.  Respirations even and unlabored at rest. No complaints voiced at this time

## 2023-10-09 ENCOUNTER — HOSPITAL ENCOUNTER (OUTPATIENT)
Dept: PHYSICAL THERAPY | Age: 68
Setting detail: THERAPIES SERIES
Discharge: HOME | End: 2023-10-09
Payer: MEDICARE

## 2023-10-09 PROCEDURE — 97035 APP MDLTY 1+ULTRASOUND EA 15: CPT

## 2023-10-09 PROCEDURE — 97110 THERAPEUTIC EXERCISES: CPT

## 2023-10-09 NOTE — PROGRESS NOTES
** PLEASE SIGN, DATE AND TIME CERTIFICATION BELOW AND RETURN TO [facility] OUTPATIENT REHABILITATION (FAX #: [phone]). ATTEST/CO-SIGN IF ACCESSING VIA [id number]. THANK YOU.**    I certify that I have examined the patient below and determined that Physical Medicine and Rehabilitation service is necessary and that I approve the established plan of care for up to 90 days or as specifically noted.   Attestation, signature or co-signature of physician indicates approval of certification requirements.    ________________________ ____________ __________  Physician Signature   Date   Time   [address] [facility]  PHYSICAL THERAPY  [] EVALUATION  [x] DAILY NOTE (LAND) [] DAILY NOTE (AQUATIC ) [] PROGRESS NOTE [] DISCHARGE NOTE    [] [address] [facility] [place]   [x] [address] [facility]    [] [facility]   [] [facility]    Date: 10/9/2023  Patient Name:  Anita Carr  : 1955  MRN: [id number]  CSN: [id number]    Referring Practitioner Geoff Corrales., ALIN   Diagnosis Pain in right hip [M25.551]  Unilateral primary osteoarthritis, right hip [M16.11]    Treatment Diagnosis R hip pain ,difficulty walking, decreased balance   Date of Evaluation 10/4/23   Additional Pertinent History Fibromyalgia dx by family MD 6 years ago, anxiety, arthritis, fall in  with femur fracture and ORIF with no residual pain, neck surgery 12 years ago      Functional Outcome Measure Used HOOS    Functional Outcome Score Eval 20 (10/4/23)       Insurance: Primary: Payor: Danilo Turpin /  /  / ,   Secondary:    Authorization Information: INSURANCE PAYS AT: 100% AFTER CO PAY               PATIENT RESPONSIBILITY AND/OR CO-PAY: $40 CO PAY   SECONDARY INSURANCE COMPANY:        PRE CERTIFICATION REQUIRED: NO  INSURANCE THERAPY BENEFIT:  UNLIMITED VISITS BASED ON MEDICAL NECESSITY   AQUATIC THERAPY COVERED: YES  MODALITIES COVERED:  YES  Does also have financial assistance from

## 2023-10-10 ENCOUNTER — HOSPITAL ENCOUNTER (OUTPATIENT)
Dept: PHYSICAL THERAPY | Age: 68
Setting detail: THERAPIES SERIES
Discharge: HOME | End: 2023-10-10
Payer: MEDICARE

## 2023-10-10 PROCEDURE — 97035 APP MDLTY 1+ULTRASOUND EA 15: CPT

## 2023-10-10 PROCEDURE — 97110 THERAPEUTIC EXERCISES: CPT

## 2023-10-10 NOTE — PROGRESS NOTES
[facility]  PHYSICAL THERAPY  [] EVALUATION  [x] DAILY NOTE (LAND) [] DAILY NOTE (AQUATIC ) [] PROGRESS NOTE [] DISCHARGE NOTE    [] [address] - [place]   [x] [address] [facility]    [] [facility]   [] Yasm[facility] Ko    Date: 10/10/2023  Patient Name:  Teodoro Casanova  : 1955  MRN: [id number]  CSN: [id number]    Referring Practitioner Arie Pizarro, ALIN   Diagnosis Pain in right hip [M25.551]  Unilateral primary osteoarthritis, right hip [M16.11]    Treatment Diagnosis R hip pain ,difficulty walking, decreased balance   Date of Evaluation 10/4/23   Additional Pertinent History Fibromyalgia dx by family MD 6 years ago, anxiety, arthritis, fall in  with femur fracture and ORIF with no residual pain, neck surgery 12 years ago      Functional Outcome Measure Used HOOS    Functional Outcome Score Eval 20 (10/4/23)       Insurance: Primary: Payor: Marion Hua /  /  / ,   Secondary:    Authorization Information: INSURANCE PAYS AT: 100% AFTER CO PAY               PATIENT RESPONSIBILITY AND/OR CO-PAY: $40 CO PAY   SECONDARY INSURANCE COMPANY:        PRE CERTIFICATION REQUIRED: NO  INSURANCE THERAPY BENEFIT:  UNLIMITED VISITS BASED ON MEDICAL NECESSITY   AQUATIC THERAPY COVERED: YES  MODALITIES COVERED:  YES  Does also have financial assistance from [address] [place], [place] [address] [place] though November   Visit # 3, 3/10 for progress note   Visits Allowed: unlimited   Recertification Date: 66   Physician Follow-Up: Injection in R hip scheduled 10/5 and f/u with Dr. Dallas Landry 2024   Physician Orders:    History of Present Illness: R hip /groin/buttock pain started 1 year ago.   Gradual worsening, did try chiropractor x 10 x but \"it hurt too bad\", went to family MD with exercises did increase pain, went to Dr. aDllas Landry 3 months ago, 1 month ago did injection with less pain x 5 days, pain returned , back to MD office on  and ordered a 2nd injection which

## 2023-10-12 ENCOUNTER — HOSPITAL ENCOUNTER (OUTPATIENT)
Dept: PHYSICAL THERAPY | Age: 68
Setting detail: THERAPIES SERIES
Discharge: HOME | End: 2023-10-12
Payer: MEDICARE

## 2023-10-12 PROCEDURE — 97110 THERAPEUTIC EXERCISES: CPT

## 2023-10-12 PROCEDURE — 97035 APP MDLTY 1+ULTRASOUND EA 15: CPT

## 2023-10-12 NOTE — PROGRESS NOTES
[address] [facility]  PHYSICAL THERAPY  [] EVALUATION  [x] DAILY NOTE (LAND) [] DAILY NOTE (AQUATIC ) [] PROGRESS NOTE [] DISCHARGE NOTE    [] [address] - [place]   [x] [facility]    [] [facility]   [] Cristy Bunch    Date: 10/12/2023  Patient Name:  General Buckley  : 1955  MRN: [id number]  CSN: [id number]    Referring Practitioner Sharyle Colla., ALIN   Diagnosis Pain in right hip [M25.551]  Unilateral primary osteoarthritis, right hip [M16.11]    Treatment Diagnosis R hip pain ,difficulty walking, decreased balance   Date of Evaluation 10/4/23   Additional Pertinent History Fibromyalgia dx by family MD 6 years ago, anxiety, arthritis, fall in  with femur fracture and ORIF with no residual pain, neck surgery 12 years ago      Functional Outcome Measure Used HOOS    Functional Outcome Score Eval 20 (10/4/23)       Insurance: Primary: Payor: Vaishali Franco /  /  / ,   Secondary:    Authorization Information: INSURANCE PAYS AT: 100% AFTER CO PAY               PATIENT RESPONSIBILITY AND/OR CO-PAY: $40 CO PAY   SECONDARY INSURANCE COMPANY:        PRE CERTIFICATION REQUIRED: NO  INSURANCE THERAPY BENEFIT:  UNLIMITED VISITS BASED ON MEDICAL NECESSITY   AQUATIC THERAPY COVERED: YES  MODALITIES COVERED:  YES  Does also have financial assistance from [facility] though November   Visit # 4, 4/10 for progress note   Visits Allowed: unlimited   Recertification Date:    Physician Follow-Up: Injection in R hip scheduled 10/5 and f/u with Dr. Jennifer Alcantara 2024   Physician Orders:    History of Present Illness: R hip /groin/buttock pain started 1 year ago.   Gradual worsening, did try chiropractor x 10 x but \"it hurt too bad\", went to family MD with exercises did increase pain, went to Dr. Jennifer Alcantara 3 months ago, 1 month ago did injection with less pain x 5 days, pain returned , back to MD office on  and ordered a 2nd injection which

## 2023-10-16 ENCOUNTER — HOSPITAL ENCOUNTER (OUTPATIENT)
Dept: PHYSICAL THERAPY | Age: 68
Setting detail: THERAPIES SERIES
Discharge: HOME | End: 2023-10-16
Payer: MEDICARE

## 2023-10-16 PROCEDURE — 97035 APP MDLTY 1+ULTRASOUND EA 15: CPT

## 2023-10-16 PROCEDURE — 97110 THERAPEUTIC EXERCISES: CPT

## 2023-10-16 NOTE — PROGRESS NOTES
[facility]  PHYSICAL THERAPY  [] EVALUATION  [x] DAILY NOTE (LAND) [] DAILY NOTE (AQUATIC ) [] PROGRESS NOTE [] DISCHARGE NOTE    [] [address] - [place]   [x] 44 [facility]    [] [facility]   [] Terri Amado    Date: 10/16/2023  Patient Name:  Leigh Gallagher  : 1955  MRN: [id number]  CSN: [id number]    Referring Practitioner Anita Gong., ALIN   Diagnosis Pain in right hip [M25.551]  Unilateral primary osteoarthritis, right hip [M16.11]    Treatment Diagnosis R hip pain ,difficulty walking, decreased balance   Date of Evaluation 10/4/23   Additional Pertinent History Fibromyalgia dx by family MD 6 years ago, anxiety, arthritis, fall in  with femur fracture and ORIF with no residual pain, neck surgery 12 years ago      Functional Outcome Measure Used HOOS    Functional Outcome Score Eval 20 (10/4/23)       Insurance: Primary: Payor: Jamia Schmid /  /  / ,   Secondary:    Authorization Information: INSURANCE PAYS AT: 100% AFTER CO PAY               PATIENT RESPONSIBILITY AND/OR CO-PAY: $40 CO PAY   SECONDARY INSURANCE COMPANY:        PRE CERTIFICATION REQUIRED: NO  INSURANCE THERAPY BENEFIT:  UNLIMITED VISITS BASED ON MEDICAL NECESSITY   AQUATIC THERAPY COVERED: YES  MODALITIES COVERED:  YES  Does also have financial assistance from [facility] though November   Visit # 5, 5/10 for progress note   Visits Allowed: unlimited   Recertification Date: 26   Physician Follow-Up: Injection in R hip scheduled 10/5 and f/u with Dr. Xavier Carrasco 2024   Physician Orders:    History of Present Illness: R hip /groin/buttock pain started 1 year ago.   Gradual worsening, did try chiropractor x 10 x but \"it hurt too bad\", went to family MD with exercises did increase pain, went to Dr. Xavier Carrasco 3 months ago, 1 month ago did injection with less pain x 5 days, pain returned , back to MD office on  and ordered a 2nd injection which

## 2023-10-18 ENCOUNTER — HOSPITAL ENCOUNTER (OUTPATIENT)
Dept: PHYSICAL THERAPY | Age: 68
Setting detail: THERAPIES SERIES
Discharge: HOME | End: 2023-10-18
Payer: MEDICARE

## 2023-10-18 PROCEDURE — 97035 APP MDLTY 1+ULTRASOUND EA 15: CPT

## 2023-10-18 PROCEDURE — 97110 THERAPEUTIC EXERCISES: CPT

## 2023-10-18 NOTE — PROGRESS NOTES
[address] [facility]  PHYSICAL THERAPY  [] EVALUATION  [x] DAILY NOTE (LAND) [] DAILY NOTE (AQUATIC ) [] PROGRESS NOTE [] DISCHARGE NOTE    [] [address] - [place]   [x] 44 [facility]    [] [facility]   [] Erin Cumberland    Date: 10/18/2023  Patient Name:  Gillian Washington  : 1955  MRN: [id number]  CSN: [id number]    Referring Practitioner Krzysztof Metz., ALIN   Diagnosis Pain in right hip [M25.551]  Unilateral primary osteoarthritis, right hip [M16.11]    Treatment Diagnosis R hip pain ,difficulty walking, decreased balance   Date of Evaluation 10/4/23   Additional Pertinent History Fibromyalgia dx by family MD 6 years ago, anxiety, arthritis, fall in  with femur fracture and ORIF with no residual pain, neck surgery 12 years ago      Functional Outcome Measure Used HOOS    Functional Outcome Score Eval 20 (10/4/23)       Insurance: Primary: Payor: Warren Singleton /  /  / ,   Secondary:    Authorization Information: INSURANCE PAYS AT: 100% AFTER CO PAY               PATIENT RESPONSIBILITY AND/OR CO-PAY: $40 CO PAY   SECONDARY INSURANCE COMPANY:        PRE CERTIFICATION REQUIRED: NO  INSURANCE THERAPY BENEFIT:  UNLIMITED VISITS BASED ON MEDICAL NECESSITY   AQUATIC THERAPY COVERED: YES  MODALITIES COVERED:  YES  Does also have financial assistance from [facility] though November   Visit # 6, 6/10 for progress note   Visits Allowed: unlimited   Recertification Date: 98   Physician Follow-Up: Injection in R hip scheduled 10/5 and f/u with Dr. Priti Hawk 2024   Physician Orders:    History of Present Illness: R hip /groin/buttock pain started 1 year ago.   Gradual worsening, did try chiropractor x 10 x but \"it hurt too bad\", went to family MD with exercises did increase pain, went to Dr. Priti Hawk 3 months ago, 1 month ago did injection with less pain x 5 days, pain returned , back to MD office on  and ordered a 2nd injection which 63

## 2023-10-19 ENCOUNTER — APPOINTMENT (OUTPATIENT)
Dept: PHYSICAL THERAPY | Age: 68
End: 2023-10-19
Payer: MEDICARE

## 2023-10-23 ENCOUNTER — APPOINTMENT (OUTPATIENT)
Dept: PHYSICAL THERAPY | Age: 68
End: 2023-10-23
Payer: MEDICARE

## 2023-10-24 ENCOUNTER — HOSPITAL ENCOUNTER (OUTPATIENT)
Dept: PHYSICAL THERAPY | Age: 68
Setting detail: THERAPIES SERIES
Discharge: HOME | End: 2023-10-24
Payer: MEDICARE

## 2023-10-24 PROCEDURE — 97110 THERAPEUTIC EXERCISES: CPT

## 2023-10-24 PROCEDURE — 97035 APP MDLTY 1+ULTRASOUND EA 15: CPT

## 2023-10-24 ASSESSMENT — HOOS JR
HOOS JR TOTAL INTERVAL SCORE: 36.363
GOING UP OR DOWN STAIRS: 3
WALKING ON UNEVEN SURFACE: 3
HOOS JR RAW SCORE: 17
BENDING TO THE FLOOR TO PICK UP OBJECT: 3
LYING IN BED (TURNING OVER, MAINTAINING HIP POSITION): 3
HOOS JR RAW SCORE: 17
SITTING: 3
RISING FROM SITTING: 2

## 2023-10-24 NOTE — DISCHARGE SUMMARY
exercises and high pain levels continue. Patient is going to f/u with MD and will be discussing THR due to severe pain 8/10 with all movements. Will discharge to HEP and advised patient to continue with exercises daily to keep working on AROM and strength. GOALS:  Patient Goal: to get my pain to lessen    Short Term Goals:  Time Frame: deferred to LT's    Long Term Goals:  Time Frame: 5 week  Increase AROM R hip flexion 60, abduction 20, knee 0- 125 degrees to allow patient to get R leg in/out of bed without using arms. MET FOR AROM R KNEE 0- 130 DEGREES  , MODERATE PROGRESS WITH HIP FLEXION IMPROVED TO 40, ABDUCTION TO 18 DEGREES BUT PAIN 8/10 WITH LIFTING- ,HAS  TO USE ARMS TO LIFT LEG DUE TO 8/10 PAIN IN/OUT OF BED AND CAR  Increase strength R LE to 4-/5 to allow patient to walk x 20 minutes at store with decreased pain to 4/10. MET FOR STRENGTH 4-/5 BUT PAIN WALKING 8/10  I  with HEP to improve Tinetti score 22/28 to allow patient to walk without device with no LOB. MET FOR HEP AND TINETTI 22/28 BUT STILL USING RW OR CANE DUE TO SEVERE PAIN    Patient Education:   []  HEP/Education Completed:abdominal bracing/ keeping SPC with her at all times. []  No new Education completed  [x]  Reviewed Prior HEP      [x]  Patient verbalized and/or demonstrated understanding of education provided. []  Patient unable to verbalize and/or demonstrate understanding of education provided. Will continue education. [x]  Barriers to learning: handout needed    PLAN:  Treatment Recommendations: Strengthening, Range of Motion, Balance Training, Gait Training, Stair Training, Home Exercise Program, Patient Education, and Modalities    []  Plan of care initiated. Plan to see patient 3 times per week for 5 weeks to address the treatment planned outlined above.   []  Continue with current plan of care  []  Modify plan of care as follows:    []  Hold pending physician visit  [x]  Discharge    Time In 950   Time Out 1030   Timed

## 2023-10-26 ENCOUNTER — APPOINTMENT (OUTPATIENT)
Dept: PHYSICAL THERAPY | Age: 68
End: 2023-10-26
Payer: MEDICARE

## 2023-10-30 ENCOUNTER — APPOINTMENT (OUTPATIENT)
Dept: PHYSICAL THERAPY | Age: 68
End: 2023-10-30
Payer: MEDICARE

## 2023-10-31 ENCOUNTER — APPOINTMENT (OUTPATIENT)
Dept: PHYSICAL THERAPY | Age: 68
End: 2023-10-31
Payer: MEDICARE

## 2023-11-22 DIAGNOSIS — R10.84 GENERALIZED ABDOMINAL PAIN: Primary | ICD-10-CM

## 2023-11-22 RX ORDER — HYDROCODONE BITARTRATE AND ACETAMINOPHEN 5; 325 MG/1; MG/1
1 TABLET ORAL EVERY 6 HOURS PRN
Qty: 20 TABLET | Refills: 0 | Status: SHIPPED | OUTPATIENT
Start: 2023-11-22 | End: 2023-11-27

## 2023-11-25 ENCOUNTER — APPOINTMENT (OUTPATIENT)
Dept: GENERAL RADIOLOGY | Age: 68
End: 2023-11-25
Payer: MEDICARE

## 2023-11-25 ENCOUNTER — HOSPITAL ENCOUNTER (EMERGENCY)
Age: 68
Discharge: HOME | End: 2023-11-25
Payer: MEDICARE

## 2023-11-25 VITALS
HEART RATE: 52 BPM | RESPIRATION RATE: 18 BRPM | TEMPERATURE: 97.5 F | DIASTOLIC BLOOD PRESSURE: 75 MMHG | SYSTOLIC BLOOD PRESSURE: 128 MMHG | OXYGEN SATURATION: 96 %

## 2023-11-25 DIAGNOSIS — R07.89 CHEST WALL PAIN: Primary | ICD-10-CM

## 2023-11-25 LAB
EKG ATRIAL RATE: 51 BPM
EKG P AXIS: 87 DEGREES
EKG P-R INTERVAL: 174 MS
EKG Q-T INTERVAL: 434 MS
EKG QRS DURATION: 86 MS
EKG QTC CALCULATION (BAZETT): 400 MS
EKG R AXIS: 73 DEGREES
EKG T AXIS: 88 DEGREES
EKG VENTRICULAR RATE: 51 BPM

## 2023-11-25 PROCEDURE — 93010 ELECTROCARDIOGRAM REPORT: CPT | Performed by: NUCLEAR MEDICINE

## 2023-11-25 PROCEDURE — 71101 X-RAY EXAM UNILAT RIBS/CHEST: CPT

## 2023-11-25 PROCEDURE — 99213 OFFICE O/P EST LOW 20 MIN: CPT

## 2023-11-25 PROCEDURE — 93005 ELECTROCARDIOGRAM TRACING: CPT | Performed by: NURSE PRACTITIONER

## 2023-11-25 PROCEDURE — 99213 OFFICE O/P EST LOW 20 MIN: CPT | Performed by: NURSE PRACTITIONER

## 2023-11-25 RX ORDER — LIDOCAINE 50 MG/G
1 PATCH TOPICAL DAILY
Qty: 10 PATCH | Refills: 0 | Status: SHIPPED | OUTPATIENT
Start: 2023-11-25 | End: 2023-12-05

## 2023-11-25 ASSESSMENT — PAIN SCALES - GENERAL: PAINLEVEL_OUTOF10: 5

## 2023-11-25 ASSESSMENT — PAIN DESCRIPTION - ORIENTATION: ORIENTATION: LEFT;MID

## 2023-11-25 ASSESSMENT — PAIN DESCRIPTION - LOCATION: LOCATION: RIB CAGE

## 2023-11-25 ASSESSMENT — PAIN - FUNCTIONAL ASSESSMENT: PAIN_FUNCTIONAL_ASSESSMENT: 0-10

## 2023-11-25 NOTE — DISCHARGE INSTRUCTIONS
Use heating pad to the affected area. Continue muscle relaxers. Use lidocaine patches as prescribed. Follow-up with your primary care provider on Monday. Report to ER with new or severe symptoms.

## 2023-11-25 NOTE — ED PROVIDER NOTES
Karl Enriquez       Chief Complaint   Patient presents with    Chest Pain       Nurses Notes reviewed and I agree except as noted in the HPI. HISTORY OF PRESENT ILLNESS   Natali Madera is a 80 y.o. female who presents to urgent care with complaints of left rib pain. She states that she has a history of cervical spine issues. She started doing her cervical spine exercises approximately last Wednesday. She started having pain to the left shoulder and chest on Saturday. Pain is sharp. It is worse with movement. Pain with palpation. No recent trauma or falls. Patient states that she feels like she pulled something doing her exercises. REVIEW OF SYSTEMS     Review of Systems   Cardiovascular:  Positive for chest pain. PAST MEDICAL HISTORY         Diagnosis Date    Anxiety     Arthralgia     Arthritis     Cervical radiculopathy at C5 02/16/2015    Chronic abdominal pain     Diverticulitis     Fibromyalgia 08/21/2015    Hypercholesteremia 03/02/2022    IBS (irritable bowel syndrome)     Irritable bowel disease     Other irritable bowel syndrome 03/02/2022    Primary osteoarthritis involving multiple joints 01/04/2019    Tension headache 02/16/2015       SURGICAL HISTORY     Patient  has a past surgical history that includes Appendectomy; Ovary removal; Tonsillectomy; Abdominal exploration surgery; Colonoscopy (11/15/11); Upper gastrointestinal endoscopy (11/15/11); Cholecystectomy (3/4/08); cervical fusion (oct 2008); other surgical history (12/9/13); colectomy (12/09/2013); Hip fracture surgery (Left, 06/2018); Colonoscopy (Left, 11/25/2020); Breast biopsy (Right, 12/14/2016); and [facility] STEREO BREAST BX W LOC DEVICE 1ST LESION RIGHT (Right, 11/18/2016).     CURRENT MEDICATIONS       Discharge Medication List as of 11/25/2023 11:30 AM        CONTINUE these medications which have NOT CHANGED    Details   HYDROcodone-acetaminophen ([place]) 5325

## 2023-11-25 NOTE — ED TRIAGE NOTES
Started this last Sunday with random sharp/shocking pain which can come as much as 15 minutes apart, from left shoulder blade around to breast bone, keeping awake at night

## 2023-11-30 ENCOUNTER — OFFICE VISIT (OUTPATIENT)
Dept: FAMILY MEDICINE CLINIC | Age: 68
End: 2023-11-30
Payer: MEDICARE

## 2023-11-30 VITALS
BODY MASS INDEX: 20.49 KG/M2 | WEIGHT: 135.2 LBS | DIASTOLIC BLOOD PRESSURE: 82 MMHG | RESPIRATION RATE: 16 BRPM | HEIGHT: 68 IN | OXYGEN SATURATION: 97 % | TEMPERATURE: 97.2 F | SYSTOLIC BLOOD PRESSURE: 110 MMHG | HEART RATE: 78 BPM

## 2023-11-30 DIAGNOSIS — M79.7 FIBROMYALGIA: Chronic | ICD-10-CM

## 2023-11-30 DIAGNOSIS — F41.1 GAD (GENERALIZED ANXIETY DISORDER): Chronic | ICD-10-CM

## 2023-11-30 DIAGNOSIS — M54.14 RADICULOPATHY, THORACIC REGION: Primary | ICD-10-CM

## 2023-11-30 DIAGNOSIS — M54.9 UPPER BACK PAIN ON LEFT SIDE: ICD-10-CM

## 2023-11-30 DIAGNOSIS — F51.01 PRIMARY INSOMNIA: ICD-10-CM

## 2023-11-30 PROCEDURE — 99214 OFFICE O/P EST MOD 30 MIN: CPT | Performed by: FAMILY MEDICINE

## 2023-11-30 PROCEDURE — 1123F ACP DISCUSS/DSCN MKR DOCD: CPT | Performed by: FAMILY MEDICINE

## 2023-11-30 RX ORDER — BACLOFEN 20 MG/1
20 TABLET ORAL 3 TIMES DAILY PRN
Qty: 90 TABLET | Refills: 2 | Status: SHIPPED | OUTPATIENT
Start: 2023-11-30

## 2023-11-30 RX ORDER — GABAPENTIN 100 MG/1
100 CAPSULE ORAL NIGHTLY
Qty: 30 CAPSULE | Refills: 0 | Status: SHIPPED | OUTPATIENT
Start: 2023-11-30 | End: 2023-12-30

## 2023-11-30 RX ORDER — LIDOCAINE 50 MG/G
OINTMENT TOPICAL
Qty: 50 G | Refills: 5 | Status: SHIPPED | OUTPATIENT
Start: 2023-11-30

## 2023-11-30 ASSESSMENT — ENCOUNTER SYMPTOMS
BACK PAIN: 1
SHORTNESS OF BREATH: 0

## 2023-11-30 NOTE — PROGRESS NOTES
Magdy Dixon (:  1955) is a 80 y.o. female,Established patient, here for evaluation of the following chief complaint(s):  Follow-Up from Hospital (Pt states she's having shocking/shooting pains around the left side of her chest to her back. Feels like she has a knot between her shoulder blades. Noticing pain in her left breast as well. Denies any redness or swelling. She says its just super tender and takes her breath away. Pt states symptoms started on  and feels like things are about the same.)         ASSESSMENT/PLAN:  1. Radiculopathy, thoracic region  -     lidocaine (XYLOCAINE) 5 % ointment; Apply topically as needed up to 5 times a day, Disp-50 g, R-5, Normal  -     gabapentin (NEURONTIN) 100 MG capsule; Take 1 capsule by mouth nightly for 30 days. , Disp-30 capsule, R-0Normal  2. Fibromyalgia  -     baclofen (LIORESAL) 20 MG tablet; Take 1 tablet by mouth 3 times daily as needed (muscle spasm/pain), Disp-90 tablet, R-2Normal  3. CAMILLE (generalized anxiety disorder)  4. Primary insomnia  5. Upper back pain on left side  -     lidocaine (XYLOCAINE) 5 % ointment; Apply topically as needed up to 5 times a day, Disp-50 g, R-5, Normal  -     gabapentin (NEURONTIN) 100 MG capsule; Take 1 capsule by mouth nightly for 30 days. , Disp-30 capsule, R-0Normal    Lifted up cane for patient to avoid having to lean much to the left side. Will start gabapentin only at night to allow some radiculopathy pain relief. We discussed that this may also help her fibromyalgia pain has been flared up. Lidocaine ointment of 5% may be also helpful during the day. --Thoracic x-ray to be done as neck step if it continues. Patient has history of osteopenia in 2019. Will aim for a DEXA scan update in the near future. Zanaflex declined. Recommend she get mammogram soon. She states that currently she is not able to tolerate the pain.   I gave it the patient several stretches to help with the chest wall

## 2023-12-04 ENCOUNTER — PREP FOR PROCEDURE (OUTPATIENT)
Dept: ORTHOPEDIC SURGERY | Age: 68
End: 2023-12-04

## 2023-12-04 RX ORDER — SODIUM CHLORIDE 9 MG/ML
INJECTION, SOLUTION INTRAVENOUS CONTINUOUS
Status: CANCELLED | OUTPATIENT
Start: 2023-12-04

## 2023-12-04 RX ORDER — SODIUM CHLORIDE 9 MG/ML
INJECTION, SOLUTION INTRAVENOUS PRN
Status: CANCELLED | OUTPATIENT
Start: 2023-12-04

## 2023-12-04 RX ORDER — SODIUM CHLORIDE 0.9 % (FLUSH) 0.9 %
5-40 SYRINGE (ML) INJECTION PRN
Status: CANCELLED | OUTPATIENT
Start: 2023-12-04

## 2023-12-04 RX ORDER — ACETAMINOPHEN 500 MG
1000 TABLET ORAL ONCE
Status: CANCELLED | OUTPATIENT
Start: 2023-12-04 | End: 2023-12-04

## 2023-12-04 RX ORDER — TRANEXAMIC ACID 10 MG/ML
1000 INJECTION, SOLUTION INTRAVENOUS
Status: CANCELLED | OUTPATIENT
Start: 2023-12-04 | End: 2023-12-04

## 2023-12-04 RX ORDER — SODIUM CHLORIDE 0.9 % (FLUSH) 0.9 %
5-40 SYRINGE (ML) INJECTION EVERY 12 HOURS SCHEDULED
Status: CANCELLED | OUTPATIENT
Start: 2023-12-04

## 2023-12-28 NOTE — PROGRESS NOTES
PAT VISIT  Appointment reminder call given  Date: 1/4  Arrival time: 12:30 and location; [facility]   Bring Drivers license and insurance  Bring Medications in original bottles  Please be ready to give Urine Sample  If possible bring caregiver for appointment  Take am medications with water unless you are holding any for surgery  Appointment may last 2 hours

## 2024-01-04 ENCOUNTER — HOSPITAL ENCOUNTER (OUTPATIENT)
Dept: PREADMISSION TESTING | Age: 69
Discharge: HOME | End: 2024-01-04
Payer: MEDICARE

## 2024-01-04 VITALS
HEART RATE: 46 BPM | RESPIRATION RATE: 18 BRPM | BODY MASS INDEX: 20.72 KG/M2 | HEIGHT: 68 IN | SYSTOLIC BLOOD PRESSURE: 119 MMHG | TEMPERATURE: 97.6 F | WEIGHT: 136.69 LBS | DIASTOLIC BLOOD PRESSURE: 74 MMHG | OXYGEN SATURATION: 100 %

## 2024-01-04 LAB
ANION GAP SERPL CALC-SCNC: 11 MEQ/L (ref 8–16)
BACTERIA URNS QL MICRO: ABNORMAL /HPF
BASOPHILS ABSOLUTE: 0.1 THOU/MM3 (ref 0–0.1)
BASOPHILS NFR BLD AUTO: 1 %
BILIRUB UR QL STRIP.AUTO: NEGATIVE
BUN SERPL-MCNC: 15 MG/DL (ref 7–22)
CALCIUM SERPL-MCNC: 9.4 MG/DL (ref 8.5–10.5)
CASTS #/AREA URNS LPF: ABNORMAL /LPF
CASTS 2: ABNORMAL /LPF
CHARACTER UR: CLEAR
CHLORIDE SERPL-SCNC: 103 MEQ/L (ref 98–111)
CO2 SERPL-SCNC: 24 MEQ/L (ref 23–33)
COLOR: YELLOW
CREAT SERPL-MCNC: 0.8 MG/DL (ref 0.4–1.2)
CRYSTALS URNS MICRO: ABNORMAL
DEPRECATED RDW RBC AUTO: 42.4 FL (ref 35–45)
EOSINOPHIL NFR BLD AUTO: 3.2 %
EOSINOPHILS ABSOLUTE: 0.2 THOU/MM3 (ref 0–0.4)
EPITHELIAL CELLS, UA: ABNORMAL /HPF
ERYTHROCYTE [DISTWIDTH] IN BLOOD BY AUTOMATED COUNT: 12 % (ref 11.5–14.5)
GFR SERPL CREATININE-BSD FRML MDRD: > 60 ML/MIN/1.73M2
GLUCOSE SERPL-MCNC: 101 MG/DL (ref 70–108)
GLUCOSE UR QL STRIP.AUTO: NEGATIVE MG/DL
HCT VFR BLD AUTO: 44.4 % (ref 37–47)
HGB BLD-MCNC: 15 GM/DL (ref 12–16)
HGB UR QL STRIP.AUTO: NEGATIVE
IMM GRANULOCYTES # BLD AUTO: 0.03 THOU/MM3 (ref 0–0.07)
IMM GRANULOCYTES NFR BLD AUTO: 0.4 %
KETONES UR QL STRIP.AUTO: ABNORMAL
LYMPHOCYTES ABSOLUTE: 2.3 THOU/MM3 (ref 1–4.8)
LYMPHOCYTES NFR BLD AUTO: 32 %
MCH RBC QN AUTO: 32.4 PG (ref 26–33)
MCHC RBC AUTO-ENTMCNC: 33.8 GM/DL (ref 32.2–35.5)
MCV RBC AUTO: 95.9 FL (ref 81–99)
MISCELLANEOUS 2: ABNORMAL
MONOCYTES ABSOLUTE: 0.5 THOU/MM3 (ref 0.4–1.3)
MONOCYTES NFR BLD AUTO: 7 %
MRSA DNA SPEC QL NAA+PROBE: NEGATIVE
NEUTROPHILS NFR BLD AUTO: 56.4 %
NITRITE UR QL STRIP: NEGATIVE
NRBC BLD AUTO-RTO: 0 /100 WBC
PH UR STRIP.AUTO: 6.5 [PH] (ref 5–9)
PLATELET # BLD AUTO: 257 THOU/MM3 (ref 130–400)
PMV BLD AUTO: 9.1 FL (ref 9.4–12.4)
POTASSIUM SERPL-SCNC: 4.4 MEQ/L (ref 3.5–5.2)
PROT UR STRIP.AUTO-MCNC: ABNORMAL MG/DL
RBC # BLD AUTO: 4.63 MILL/MM3 (ref 4.2–5.4)
RBC URINE: ABNORMAL /HPF
RENAL EPI CELLS #/AREA URNS HPF: ABNORMAL /[HPF]
SEGMENTED NEUTROPHILS ABSOLUTE COUNT: 4.1 THOU/MM3 (ref 1.8–7.7)
SODIUM SERPL-SCNC: 138 MEQ/L (ref 135–145)
SP GR UR REFRACT.AUTO: 1.02 (ref 1–1.03)
UROBILINOGEN, URINE: 1 EU/DL (ref 0–1)
WBC # BLD AUTO: 7.2 THOU/MM3 (ref 4.8–10.8)
WBC #/AREA URNS HPF: ABNORMAL /HPF
WBC #/AREA URNS HPF: ABNORMAL /[HPF]
YEAST LIKE FUNGI URNS QL MICRO: ABNORMAL

## 2024-01-04 PROCEDURE — 87086 URINE CULTURE/COLONY COUNT: CPT

## 2024-01-04 PROCEDURE — 81001 URINALYSIS AUTO W/SCOPE: CPT

## 2024-01-04 PROCEDURE — 36415 COLL VENOUS BLD VENIPUNCTURE: CPT

## 2024-01-04 PROCEDURE — 85025 COMPLETE CBC W/AUTO DIFF WBC: CPT

## 2024-01-04 PROCEDURE — 80048 BASIC METABOLIC PNL TOTAL CA: CPT

## 2024-01-04 PROCEDURE — 87641 MR-STAPH DNA AMP PROBE: CPT

## 2024-01-04 RX ORDER — VITAMIN B COMPLEX
1 CAPSULE ORAL DAILY
COMMUNITY

## 2024-01-04 ASSESSMENT — PAIN DESCRIPTION - LOCATION: LOCATION: HIP

## 2024-01-04 ASSESSMENT — PAIN DESCRIPTION - DESCRIPTORS: DESCRIPTORS: DULL;THROBBING

## 2024-01-04 ASSESSMENT — PAIN DESCRIPTION - FREQUENCY: FREQUENCY: CONTINUOUS

## 2024-01-04 ASSESSMENT — PAIN DESCRIPTION - ONSET: ONSET: ON-GOING

## 2024-01-04 ASSESSMENT — PAIN DESCRIPTION - PAIN TYPE: TYPE: CHRONIC PAIN

## 2024-01-04 ASSESSMENT — PAIN SCALES - GENERAL: PAINLEVEL_OUTOF10: 5

## 2024-01-04 ASSESSMENT — PAIN DESCRIPTION - ORIENTATION: ORIENTATION: RIGHT

## 2024-01-04 NOTE — PROGRESS NOTES
Keeping You Safe for Surgery    Staphylococcus aureus or “Staph” is a germ that lives on the skin and in the nose of some healthy people. Your skin protects you from those germs. However, when you have surgery, we will be cutting your skin. Sometimes germs can get into those cuts and cause infection.    How do we screen for Staph?  We will swab your nose to see if you are a carrier of Staph. The results will be available about 2 hours after we obtain the nasal specimen.      A positive test does not mean you have an infection; it just means you are a carrier of Staph. Your surgery most likely will not be canceled or delayed.    If my test is positive, what happens?  If your test is positive, a nurse will call you and tell you to get Mupirocin Ointment (Bactroban) at your pharmacy. The medicine may come in one large tube or may come in many small packets. When the medication is administered into your nose, it works by killing some of the germs that could cause you to get a surgical site infection.   If you get a big tube of Mupirocin, place enough medicine to cover the tip of a cotton swab (Q-tip). Place the cotton swab inside one nostril and rub the medicine onto the inside of the nose. Then repeat this same procedure in your other nostril.  If you get small individual tubes, put half the tube on a cotton swab and put the medicine in one nostril. Then the other half in the other nostril.  After the medication has been inserted into each nostril, gently press your nose together and release for about a minute to get the medicine all over the inside of your nose.   Do this once in the morning and once at night for 5 days prior to your scheduled surgery date.        If I have Staph, will I be treated differently in the hospital?  If you have a certain type of Staph called “MRSA” (Methicillin-Resistant Staph aureus), you will be in a single room on “Contact Precautions.” This means your doctors and nurses will  wear gloves and gowns when taking care of you. We do this (along with good hand hygiene) to make sure we do not spread MRSA to any another patients in our care.        SURGERY PREPARATION CHECKLIST     NAME: ________________________________________     DATE OF SURGERY: ____________________________    Enter Dates, Check (?) circles to indicate task is completed.    Date MUPIROCIN NASAL OINTMENT BODY CLEANSING     DAY 1 ________1/11______________   Morning    Evening          Day 2 _________1/12_____________   Morning     Evening        Day 3 _________1/13_____________   Morning  Evening        Day 4 ________1/14______________   Morning    Evening        Day 5 _______1/15_______________   Morning  Evening        Day 6 ________1/16______________  (Day of Surgery)               PLEASE COMPLETE and BRING THIS CHECKLIST WITH YOU TO THE HOSPITAL to give to your nurse on the day of surgery.   You will be notified if you need to use Mupirocin Nasal Ointment.

## 2024-01-04 NOTE — PROGRESS NOTES
Preliminary Discharge Planning Questionnaire  Date of Surgery 1/16/23   Surgeon Dr Ivy      Having the proper help and care after surgery is very important to your recovery. Who will be able to help you at home when you are discharged from the hospital?    significant other    Name(s) Pro    How many steps to enter your home?  4    Bathroom on first floor?  Yes    Bedroom on the first floor?  Yes    Do you have an elevated toilet seat to use at home?  Yes    Do you have a walker to use at home?    Total Joints - with wheels Yes   Spine - with wheels  N/A     Have you been doing home exercises?yes    *You will go home with some outpatient physical therapy, where do you prefer to go?          [facility]     This typically will not start until after your post visit to your Dr. (3-4 weeks after surgery)    * What home health agency would you like to use? [facility]      List of all Home Health Agencies Available with website ( per Social Work Team) given to patient     Please have 2 preferences when you come for surgery- 1st and 2nd choice

## 2024-01-04 NOTE — PROGRESS NOTES
Pain-patient states pain is a 5/10 and describes as \"annoying\" dull throbbing when sitting and sharp when walking. Pain scale and management reviewed and verbalizes understanding.

## 2024-01-04 NOTE — PROGRESS NOTES
Joint Replacement Pre-op Instruction Sheet    Nothing to eat or drink after midnight the night before surgery, except sips of water to take medications.  Bring photo ID and any health insurance cards.  Wear comfortable clean loose-fitting clothing.  Do not wear any jewelry, do not glue in dentures  Bring your medication in the original bottles.  Bring your walker with 2 wheels if you have one.  Bring your CPAP machine if you have one.  Wear clean clothes to bed and place fresh clean sheets and pillowcases on your bed the night before surgery  Your expected length of stay in the hospital after your surgery is 1-2 days  You will be discharged with outpatient physical therapy and possibly home health care  Shower:  Shower 2 days before, day before and morning of surgery using the Start Clean® kit provided (follow the instructions provided with the kit)  Do not shave the surgical area! If needed, your nurse will use clippers to remove any excess hair at the surgical site when you come in for surgery. Do not use a razor on the site of your surgery for at least 2 days prior to surgery because shaving can leave tiny nicks in the skin which may allow germs to enter and cause infection.  Educational handouts on Preventing Surgical Site infections, General Anesthesia, Coughing and Deep Breathing/ Incentive Spirometer as needed, Fall Prevention, MRSA/MSSA, Hand Hygiene and Jewelry Precautions Given  Joint Handbook and Exercise Booklet given and reviewed with patient  Perform the exercises given in your booklet 3 times daily starting today  Physical Therapy Video; [email or web address]    If you have any questions about your preoperative preparations, please call the [facility] Pre-Admission Testing department at [phone] M-F 7:30-4  Updated 1/10/23        START CLEAN® KIT SHOWER INSTRUCITONS SURGERY:     ____1/14____ (date)  FIRST SHOWER: Two days before surgery: Take a shower and wash your entire body, including

## 2024-01-05 LAB
BACTERIA UR CULT: ABNORMAL
ORGANISM: ABNORMAL

## 2024-01-08 ENCOUNTER — OFFICE VISIT (OUTPATIENT)
Dept: FAMILY MEDICINE CLINIC | Age: 69
End: 2024-01-08
Payer: MEDICARE

## 2024-01-08 ENCOUNTER — TELEPHONE (OUTPATIENT)
Dept: FAMILY MEDICINE CLINIC | Age: 69
End: 2024-01-08

## 2024-01-08 VITALS
WEIGHT: 138 LBS | BODY MASS INDEX: 20.92 KG/M2 | HEIGHT: 68 IN | RESPIRATION RATE: 16 BRPM | DIASTOLIC BLOOD PRESSURE: 82 MMHG | SYSTOLIC BLOOD PRESSURE: 122 MMHG | HEART RATE: 80 BPM

## 2024-01-08 DIAGNOSIS — Z01.818 PRE-OPERATIVE EXAMINATION: Primary | ICD-10-CM

## 2024-01-08 DIAGNOSIS — Z23 NEED FOR PNEUMOCOCCAL VACCINATION: ICD-10-CM

## 2024-01-08 DIAGNOSIS — M25.551 ARTHRALGIA OF RIGHT HIP: ICD-10-CM

## 2024-01-08 PROBLEM — M51.37 DEGENERATION OF LUMBOSACRAL INTERVERTEBRAL DISC: Status: ACTIVE | Noted: 2024-01-08

## 2024-01-08 PROBLEM — M51.379 DEGENERATION OF LUMBOSACRAL INTERVERTEBRAL DISC: Status: ACTIVE | Noted: 2024-01-08

## 2024-01-08 PROBLEM — M25.552 PAIN OF LEFT HIP JOINT: Status: ACTIVE | Noted: 2024-01-08

## 2024-01-08 PROBLEM — M79.604 PAIN IN RIGHT LEG: Status: ACTIVE | Noted: 2023-07-28

## 2024-01-08 PROBLEM — Z96.641 PRESENCE OF RIGHT ARTIFICIAL HIP JOINT: Status: ACTIVE | Noted: 2024-01-08

## 2024-01-08 PROBLEM — Z96.641 PRESENCE OF RIGHT ARTIFICIAL HIP JOINT: Status: RESOLVED | Noted: 2024-01-08 | Resolved: 2024-01-08

## 2024-01-08 PROCEDURE — G0009 ADMIN PNEUMOCOCCAL VACCINE: HCPCS | Performed by: NURSE PRACTITIONER

## 2024-01-08 PROCEDURE — 99214 OFFICE O/P EST MOD 30 MIN: CPT | Performed by: NURSE PRACTITIONER

## 2024-01-08 PROCEDURE — 90677 PCV20 VACCINE IM: CPT | Performed by: NURSE PRACTITIONER

## 2024-01-08 PROCEDURE — 1123F ACP DISCUSS/DSCN MKR DOCD: CPT | Performed by: NURSE PRACTITIONER

## 2024-01-08 PROCEDURE — 93000 ELECTROCARDIOGRAM COMPLETE: CPT | Performed by: NURSE PRACTITIONER

## 2024-01-08 ASSESSMENT — PATIENT HEALTH QUESTIONNAIRE - PHQ9
1. LITTLE INTEREST OR PLEASURE IN DOING THINGS: 0
SUM OF ALL RESPONSES TO PHQ QUESTIONS 1-9: 0
SUM OF ALL RESPONSES TO PHQ QUESTIONS 1-9: 0
2. FEELING DOWN, DEPRESSED OR HOPELESS: 0
SUM OF ALL RESPONSES TO PHQ QUESTIONS 1-9: 0
SUM OF ALL RESPONSES TO PHQ9 QUESTIONS 1 & 2: 0
SUM OF ALL RESPONSES TO PHQ QUESTIONS 1-9: 0

## 2024-01-08 ASSESSMENT — ENCOUNTER SYMPTOMS
SHORTNESS OF BREATH: 0
COUGH: 1

## 2024-01-08 NOTE — TELEPHONE ENCOUNTER
Chitra is calling about an antibiotic for her UTI before her surgery. She thought the other DrKisha Was ordering one but he is not. Does she need one? If so please send to [facility].

## 2024-01-08 NOTE — TELEPHONE ENCOUNTER
Patient notified that [place] had ordered the Urine with culture.  Patient is calling Ivy's office to see if they will call a Rx in.

## 2024-01-08 NOTE — PROGRESS NOTES
Immunizations Administered       Name Date Dose Route    Pneumococcal, PCV20, PREVNAR 20, (age 6w+), IM, 0.5mL 1/8/2024 0.5 mL Intramuscular    Site: Deltoid- Right    Lot: [id number]    NDC: 9575-6912-71          
reviewed.   Constitutional:       General: She is awake.      Appearance: Normal appearance.   HENT:      Head: Normocephalic and atraumatic.      Right Ear: Hearing and external ear normal.      Left Ear: Hearing and external ear normal.      Nose: Nose normal. No congestion or rhinorrhea.   Eyes:      General: Lids are normal.         Right eye: No discharge.         Left eye: No discharge.      Conjunctiva/sclera: Conjunctivae normal.   Neck:      Trachea: No tracheal deviation.   Cardiovascular:      Rate and Rhythm: Normal rate and regular rhythm.      Heart sounds: Normal heart sounds. No murmur heard.  Pulmonary:      Effort: Pulmonary effort is normal. No respiratory distress.      Breath sounds: No stridor. No wheezing.   Musculoskeletal:      Cervical back: Full passive range of motion without pain.   Skin:     General: Skin is dry.      Coloration: Skin is not jaundiced or pale.   Neurological:      General: No focal deficit present.      Mental Status: She is alert. Mental status is at baseline.      Comments: Walking with a cane   Psychiatric:         Mood and Affect: Mood and affect normal.         Behavior: Behavior is cooperative.         Immunization History   Administered Date(s) Administered    COVID-19, J&J, (age 18y+), IM, 0.5 mL 03/10/2021    COVID-19, PFIZER PURPLE top, DILUTE for use, (age 12 y+), 30mcg/0.3mL 12/20/2021    Influenza Virus Vaccine 11/04/2013, 10/10/2018    Influenza, FLUAD, (age 65 y+), Adjuvanted, 0.5mL 10/30/2021, 09/06/2022, 12/02/2023    Influenza, FLUARIX, FLULAVAL, FLUZONE (age 6 mo+) AND AFLURIA, (age 3 y+), PF, 0.5mL 11/07/2017, 10/10/2018    Influenza, FLUCELVAX, (age 6 mo+), MDCK, PF, 0.5mL 11/08/2019, 10/13/2020    Pneumococcal, PCV20, PREVNAR 20, (age 6w+), IM, 0.5mL 01/08/2024    Zoster Recombinant (Shingrix) 10/13/2020, 01/02/2021       Health Maintenance   Topic Date Due    DTaP/Tdap/Td vaccine (1 - Tdap) Never done    Respiratory Syncytial Virus (RSV) Pregnant

## 2024-01-15 ENCOUNTER — ANESTHESIA EVENT (OUTPATIENT)
Dept: OPERATING ROOM | Age: 69
End: 2024-01-15
Payer: MEDICARE

## 2024-01-16 ENCOUNTER — ANESTHESIA (OUTPATIENT)
Dept: OPERATING ROOM | Age: 69
End: 2024-01-16
Payer: MEDICARE

## 2024-01-23 ENCOUNTER — APPOINTMENT (OUTPATIENT)
Dept: GENERAL RADIOLOGY | Age: 69
End: 2024-01-23
Attending: ORTHOPAEDIC SURGERY
Payer: MEDICARE

## 2024-01-23 ENCOUNTER — HOSPITAL ENCOUNTER (OUTPATIENT)
Age: 69
Discharge: HOME | End: 2024-01-24
Attending: ORTHOPAEDIC SURGERY | Admitting: ORTHOPAEDIC SURGERY
Payer: MEDICARE

## 2024-01-23 DIAGNOSIS — M16.11 OSTEOARTHRITIS OF RIGHT HIP, UNSPECIFIED OSTEOARTHRITIS TYPE: Primary | ICD-10-CM

## 2024-01-23 DIAGNOSIS — M16.11 PRIMARY OSTEOARTHRITIS OF RIGHT HIP: ICD-10-CM

## 2024-01-23 PROCEDURE — 2709999900 HC NON-CHARGEABLE SUPPLY: Performed by: ORTHOPAEDIC SURGERY

## 2024-01-23 PROCEDURE — 2580000003 HC RX 258: Performed by: ORTHOPAEDIC SURGERY

## 2024-01-23 PROCEDURE — 73501 X-RAY EXAM HIP UNI 1 VIEW: CPT

## 2024-01-23 PROCEDURE — 3600000004 HC SURGERY LEVEL 4 BASE: Performed by: ORTHOPAEDIC SURGERY

## 2024-01-23 PROCEDURE — 6360000002 HC RX W HCPCS: Performed by: ORTHOPAEDIC SURGERY

## 2024-01-23 PROCEDURE — 7100000001 HC PACU RECOVERY - ADDTL 15 MIN: Performed by: ORTHOPAEDIC SURGERY

## 2024-01-23 PROCEDURE — 64450 NJX AA&/STRD OTHER PN/BRANCH: CPT | Performed by: ANESTHESIOLOGY

## 2024-01-23 PROCEDURE — 3700000001 HC ADD 15 MINUTES (ANESTHESIA): Performed by: ORTHOPAEDIC SURGERY

## 2024-01-23 PROCEDURE — 7100000000 HC PACU RECOVERY - FIRST 15 MIN: Performed by: ORTHOPAEDIC SURGERY

## 2024-01-23 PROCEDURE — 7100000011 HC PHASE II RECOVERY - ADDTL 15 MIN: Performed by: ORTHOPAEDIC SURGERY

## 2024-01-23 PROCEDURE — 6370000000 HC RX 637 (ALT 250 FOR IP): Performed by: ORTHOPAEDIC SURGERY

## 2024-01-23 PROCEDURE — 2500000003 HC RX 250 WO HCPCS: Performed by: NURSE ANESTHETIST, CERTIFIED REGISTERED

## 2024-01-23 PROCEDURE — 2580000003 HC RX 258: Performed by: NURSE ANESTHETIST, CERTIFIED REGISTERED

## 2024-01-23 PROCEDURE — C1776 JOINT DEVICE (IMPLANTABLE): HCPCS | Performed by: ORTHOPAEDIC SURGERY

## 2024-01-23 PROCEDURE — 6360000002 HC RX W HCPCS: Performed by: NURSE ANESTHETIST, CERTIFIED REGISTERED

## 2024-01-23 PROCEDURE — 2500000003 HC RX 250 WO HCPCS: Performed by: ORTHOPAEDIC SURGERY

## 2024-01-23 PROCEDURE — 7100000010 HC PHASE II RECOVERY - FIRST 15 MIN: Performed by: ORTHOPAEDIC SURGERY

## 2024-01-23 PROCEDURE — 3600000014 HC SURGERY LEVEL 4 ADDTL 15MIN: Performed by: ORTHOPAEDIC SURGERY

## 2024-01-23 PROCEDURE — 6360000002 HC RX W HCPCS: Performed by: ANESTHESIOLOGY

## 2024-01-23 PROCEDURE — 64447 NJX AA&/STRD FEMORAL NRV IMG: CPT | Performed by: ANESTHESIOLOGY

## 2024-01-23 PROCEDURE — 3700000000 HC ANESTHESIA ATTENDED CARE: Performed by: ORTHOPAEDIC SURGERY

## 2024-01-23 PROCEDURE — 73502 X-RAY EXAM HIP UNI 2-3 VIEWS: CPT

## 2024-01-23 DEVICE — REFLECTION INTERFIT THREADED HOLE COVER
Type: IMPLANTABLE DEVICE | Site: HIP | Status: FUNCTIONAL
Brand: REFLECTION

## 2024-01-23 DEVICE — POLARSTEM LATERAL NON-CEMENTED                                    WITH TI/HA 6
Type: IMPLANTABLE DEVICE | Site: HIP | Status: FUNCTIONAL
Brand: POLARSTEM

## 2024-01-23 DEVICE — R3 0 HOLE ACETABULAR SHELL 52MM
Type: IMPLANTABLE DEVICE | Site: HIP | Status: FUNCTIONAL
Brand: R3 ACETABULAR

## 2024-01-23 DEVICE — R3 0 DEGREE XLPE ACETABULAR LINER                                    36MM INNER DIAMETER X OUTER DIAMETER 52MM
Type: IMPLANTABLE DEVICE | Site: HIP | Status: FUNCTIONAL
Brand: R3

## 2024-01-23 DEVICE — HIP H1 TOT STD COCR HD IMPL CAPPED H1 SN: Type: IMPLANTABLE DEVICE | Site: HIP | Status: FUNCTIONAL

## 2024-01-23 DEVICE — COBALT CHROME 12/14 TAPER FEMORAL                                    HEAD 36MM +0: Type: IMPLANTABLE DEVICE | Site: HIP | Status: FUNCTIONAL

## 2024-01-23 RX ORDER — LIDOCAINE HCL/PF 100 MG/5ML
SYRINGE (ML) INJECTION PRN
Status: DISCONTINUED | OUTPATIENT
Start: 2024-01-23 | End: 2024-01-23 | Stop reason: SDUPTHER

## 2024-01-23 RX ORDER — CYCLOBENZAPRINE HCL 10 MG
10 TABLET ORAL 3 TIMES DAILY PRN
Status: DISCONTINUED | OUTPATIENT
Start: 2024-01-23 | End: 2024-01-24 | Stop reason: HOSPADM

## 2024-01-23 RX ORDER — HYDROXYZINE PAMOATE 25 MG/1
25 CAPSULE ORAL 3 TIMES DAILY PRN
Status: DISCONTINUED | OUTPATIENT
Start: 2024-01-23 | End: 2024-01-24 | Stop reason: HOSPADM

## 2024-01-23 RX ORDER — KETAMINE HCL IN NACL, ISO-OSM 100MG/10ML
SYRINGE (ML) INJECTION PRN
Status: DISCONTINUED | OUTPATIENT
Start: 2024-01-23 | End: 2024-01-23 | Stop reason: SDUPTHER

## 2024-01-23 RX ORDER — SODIUM CHLORIDE 0.9 % (FLUSH) 0.9 %
5-40 SYRINGE (ML) INJECTION PRN
Status: DISCONTINUED | OUTPATIENT
Start: 2024-01-23 | End: 2024-01-23 | Stop reason: SDUPTHER

## 2024-01-23 RX ORDER — DIAZEPAM 5 MG/1
2.5 TABLET ORAL NIGHTLY PRN
Status: DISCONTINUED | OUTPATIENT
Start: 2024-01-23 | End: 2024-01-24 | Stop reason: HOSPADM

## 2024-01-23 RX ORDER — HYDROCODONE BITARTRATE AND ACETAMINOPHEN 5; 325 MG/1; MG/1
1 TABLET ORAL EVERY 4 HOURS PRN
Status: DISCONTINUED | OUTPATIENT
Start: 2024-01-23 | End: 2024-01-24 | Stop reason: HOSPADM

## 2024-01-23 RX ORDER — POLYETHYLENE GLYCOL 3350 17 G/17G
17 POWDER, FOR SOLUTION ORAL DAILY
Status: DISCONTINUED | OUTPATIENT
Start: 2024-01-23 | End: 2024-01-24 | Stop reason: HOSPADM

## 2024-01-23 RX ORDER — ONDANSETRON 2 MG/ML
4 INJECTION INTRAMUSCULAR; INTRAVENOUS EVERY 6 HOURS PRN
Status: DISCONTINUED | OUTPATIENT
Start: 2024-01-23 | End: 2024-01-24 | Stop reason: HOSPADM

## 2024-01-23 RX ORDER — ACETAMINOPHEN 500 MG
1000 TABLET ORAL ONCE
Status: COMPLETED | OUTPATIENT
Start: 2024-01-23 | End: 2024-01-23

## 2024-01-23 RX ORDER — SODIUM CHLORIDE 0.9 % (FLUSH) 0.9 %
5-40 SYRINGE (ML) INJECTION PRN
Status: DISCONTINUED | OUTPATIENT
Start: 2024-01-23 | End: 2024-01-23 | Stop reason: HOSPADM

## 2024-01-23 RX ORDER — SODIUM CHLORIDE 9 MG/ML
INJECTION, SOLUTION INTRAVENOUS PRN
Status: DISCONTINUED | OUTPATIENT
Start: 2024-01-23 | End: 2024-01-24 | Stop reason: HOSPADM

## 2024-01-23 RX ORDER — ROPIVACAINE HYDROCHLORIDE 2 MG/ML
INJECTION, SOLUTION EPIDURAL; INFILTRATION; PERINEURAL
Status: COMPLETED | OUTPATIENT
Start: 2024-01-23 | End: 2024-01-23

## 2024-01-23 RX ORDER — ROPIVACAINE HYDROCHLORIDE 5 MG/ML
INJECTION, SOLUTION EPIDURAL; INFILTRATION; PERINEURAL
Status: COMPLETED | OUTPATIENT
Start: 2024-01-23 | End: 2024-01-23

## 2024-01-23 RX ORDER — SODIUM CHLORIDE 9 MG/ML
INJECTION, SOLUTION INTRAVENOUS CONTINUOUS
Status: DISCONTINUED | OUTPATIENT
Start: 2024-01-23 | End: 2024-01-24 | Stop reason: HOSPADM

## 2024-01-23 RX ORDER — TRANEXAMIC ACID 100 MG/ML
INJECTION, SOLUTION INTRAVENOUS PRN
Status: DISCONTINUED | OUTPATIENT
Start: 2024-01-23 | End: 2024-01-23 | Stop reason: ALTCHOICE

## 2024-01-23 RX ORDER — SODIUM CHLORIDE 0.9 % (FLUSH) 0.9 %
5-40 SYRINGE (ML) INJECTION EVERY 12 HOURS SCHEDULED
Status: DISCONTINUED | OUTPATIENT
Start: 2024-01-23 | End: 2024-01-23 | Stop reason: HOSPADM

## 2024-01-23 RX ORDER — DEXAMETHASONE SODIUM PHOSPHATE 10 MG/ML
INJECTION, EMULSION INTRAMUSCULAR; INTRAVENOUS PRN
Status: DISCONTINUED | OUTPATIENT
Start: 2024-01-23 | End: 2024-01-23 | Stop reason: SDUPTHER

## 2024-01-23 RX ORDER — ONDANSETRON 2 MG/ML
INJECTION INTRAMUSCULAR; INTRAVENOUS PRN
Status: DISCONTINUED | OUTPATIENT
Start: 2024-01-23 | End: 2024-01-23 | Stop reason: SDUPTHER

## 2024-01-23 RX ORDER — TRAMADOL HYDROCHLORIDE 50 MG/1
50 TABLET ORAL EVERY 4 HOURS PRN
Status: DISCONTINUED | OUTPATIENT
Start: 2024-01-23 | End: 2024-01-24 | Stop reason: HOSPADM

## 2024-01-23 RX ORDER — FENTANYL CITRATE 50 UG/ML
25 INJECTION, SOLUTION INTRAMUSCULAR; INTRAVENOUS EVERY 5 MIN PRN
Status: DISCONTINUED | OUTPATIENT
Start: 2024-01-23 | End: 2024-01-23 | Stop reason: HOSPADM

## 2024-01-23 RX ORDER — ACETAMINOPHEN 325 MG/1
650 TABLET ORAL EVERY 6 HOURS
Status: DISCONTINUED | OUTPATIENT
Start: 2024-01-23 | End: 2024-01-24 | Stop reason: HOSPADM

## 2024-01-23 RX ORDER — SODIUM CHLORIDE 0.9 % (FLUSH) 0.9 %
5-40 SYRINGE (ML) INJECTION EVERY 12 HOURS SCHEDULED
Status: DISCONTINUED | OUTPATIENT
Start: 2024-01-23 | End: 2024-01-23 | Stop reason: SDUPTHER

## 2024-01-23 RX ORDER — PROPOFOL 10 MG/ML
INJECTION, EMULSION INTRAVENOUS CONTINUOUS PRN
Status: DISCONTINUED | OUTPATIENT
Start: 2024-01-23 | End: 2024-01-23 | Stop reason: SDUPTHER

## 2024-01-23 RX ORDER — DICYCLOMINE HYDROCHLORIDE 10 MG/1
10 CAPSULE ORAL 3 TIMES DAILY PRN
Status: DISCONTINUED | OUTPATIENT
Start: 2024-01-23 | End: 2024-01-24 | Stop reason: HOSPADM

## 2024-01-23 RX ORDER — BUPIVACAINE HYDROCHLORIDE 5 MG/ML
INJECTION, SOLUTION EPIDURAL; INTRACAUDAL PRN
Status: DISCONTINUED | OUTPATIENT
Start: 2024-01-23 | End: 2024-01-23 | Stop reason: ALTCHOICE

## 2024-01-23 RX ORDER — FENTANYL CITRATE 50 UG/ML
50 INJECTION, SOLUTION INTRAMUSCULAR; INTRAVENOUS EVERY 5 MIN PRN
Status: DISCONTINUED | OUTPATIENT
Start: 2024-01-23 | End: 2024-01-23 | Stop reason: HOSPADM

## 2024-01-23 RX ORDER — SODIUM CHLORIDE 0.9 % (FLUSH) 0.9 %
5-40 SYRINGE (ML) INJECTION EVERY 12 HOURS SCHEDULED
Status: DISCONTINUED | OUTPATIENT
Start: 2024-01-23 | End: 2024-01-24 | Stop reason: HOSPADM

## 2024-01-23 RX ORDER — ASPIRIN 325 MG
325 TABLET, DELAYED RELEASE (ENTERIC COATED) ORAL 2 TIMES DAILY
Status: DISCONTINUED | OUTPATIENT
Start: 2024-01-23 | End: 2024-01-24 | Stop reason: HOSPADM

## 2024-01-23 RX ORDER — ENEMA 19; 7 G/133ML; G/133ML
1 ENEMA RECTAL DAILY PRN
Status: DISCONTINUED | OUTPATIENT
Start: 2024-01-23 | End: 2024-01-24 | Stop reason: HOSPADM

## 2024-01-23 RX ORDER — SODIUM CHLORIDE 9 MG/ML
INJECTION, SOLUTION INTRAVENOUS PRN
Status: DISCONTINUED | OUTPATIENT
Start: 2024-01-23 | End: 2024-01-23 | Stop reason: HOSPADM

## 2024-01-23 RX ORDER — BACLOFEN 10 MG/1
20 TABLET ORAL 3 TIMES DAILY PRN
Status: DISCONTINUED | OUTPATIENT
Start: 2024-01-23 | End: 2024-01-24 | Stop reason: HOSPADM

## 2024-01-23 RX ORDER — SODIUM CHLORIDE 9 MG/ML
INJECTION, SOLUTION INTRAVENOUS CONTINUOUS
Status: DISCONTINUED | OUTPATIENT
Start: 2024-01-23 | End: 2024-01-23 | Stop reason: HOSPADM

## 2024-01-23 RX ORDER — SODIUM CHLORIDE 0.9 % (FLUSH) 0.9 %
5-40 SYRINGE (ML) INJECTION PRN
Status: DISCONTINUED | OUTPATIENT
Start: 2024-01-23 | End: 2024-01-24 | Stop reason: HOSPADM

## 2024-01-23 RX ORDER — LABETALOL HYDROCHLORIDE 5 MG/ML
10 INJECTION INTRAVENOUS
Status: DISCONTINUED | OUTPATIENT
Start: 2024-01-23 | End: 2024-01-23 | Stop reason: HOSPADM

## 2024-01-23 RX ORDER — EPHEDRINE SULFATE/0.9% NACL/PF 50 MG/5 ML
SYRINGE (ML) INTRAVENOUS PRN
Status: DISCONTINUED | OUTPATIENT
Start: 2024-01-23 | End: 2024-01-23 | Stop reason: SDUPTHER

## 2024-01-23 RX ORDER — SODIUM CHLORIDE 9 MG/ML
INJECTION, SOLUTION INTRAVENOUS PRN
Status: DISCONTINUED | OUTPATIENT
Start: 2024-01-23 | End: 2024-01-23 | Stop reason: SDUPTHER

## 2024-01-23 RX ADMIN — Medication 30 MG: at 10:05

## 2024-01-23 RX ADMIN — SODIUM CHLORIDE: 9 INJECTION, SOLUTION INTRAVENOUS at 08:04

## 2024-01-23 RX ADMIN — CYCLOBENZAPRINE HYDROCHLORIDE 10 MG: 10 TABLET, FILM COATED ORAL at 13:24

## 2024-01-23 RX ADMIN — ROPIVACAINE HYDROCHLORIDE 20 ML: 2 INJECTION, SOLUTION EPIDURAL; INFILTRATION at 10:25

## 2024-01-23 RX ADMIN — SODIUM CHLORIDE 20 MCG: 9 INJECTION, SOLUTION INTRAVENOUS at 10:30

## 2024-01-23 RX ADMIN — TRANEXAMIC ACID 1000 MG: 100 INJECTION, SOLUTION INTRAVENOUS at 10:30

## 2024-01-23 RX ADMIN — ACETAMINOPHEN 1000 MG: 500 TABLET ORAL at 08:04

## 2024-01-23 RX ADMIN — HYDROMORPHONE HYDROCHLORIDE 0.5 MG: 1 INJECTION, SOLUTION INTRAMUSCULAR; INTRAVENOUS; SUBCUTANEOUS at 22:46

## 2024-01-23 RX ADMIN — HYDROMORPHONE HYDROCHLORIDE 0.5 MG: 1 INJECTION, SOLUTION INTRAMUSCULAR; INTRAVENOUS; SUBCUTANEOUS at 18:25

## 2024-01-23 RX ADMIN — DEXAMETHASONE SODIUM PHOSPHATE 10 MG: 10 INJECTION, EMULSION INTRAMUSCULAR; INTRAVENOUS at 10:15

## 2024-01-23 RX ADMIN — ONDANSETRON 8 MG: 2 INJECTION INTRAMUSCULAR; INTRAVENOUS at 10:00

## 2024-01-23 RX ADMIN — ROPIVACAINE HYDROCHLORIDE 6 ML: 5 INJECTION, SOLUTION EPIDURAL; INFILTRATION; PERINEURAL at 10:30

## 2024-01-23 RX ADMIN — Medication 2000 MG: at 10:15

## 2024-01-23 RX ADMIN — SODIUM CHLORIDE: 9 INJECTION, SOLUTION INTRAVENOUS at 17:07

## 2024-01-23 RX ADMIN — HYDROCODONE BITARTRATE AND ACETAMINOPHEN 1 TABLET: 5; 325 TABLET ORAL at 12:57

## 2024-01-23 RX ADMIN — PROPOFOL 75 MCG/KG/MIN: 10 INJECTION, EMULSION INTRAVENOUS at 10:15

## 2024-01-23 RX ADMIN — HYDROMORPHONE HYDROCHLORIDE 0.5 MG: 1 INJECTION, SOLUTION INTRAMUSCULAR; INTRAVENOUS; SUBCUTANEOUS at 14:58

## 2024-01-23 RX ADMIN — Medication 20 MG: at 10:45

## 2024-01-23 RX ADMIN — Medication 100 MG: at 10:15

## 2024-01-23 RX ADMIN — SODIUM CHLORIDE 10 MCG: 9 INJECTION, SOLUTION INTRAVENOUS at 10:45

## 2024-01-23 RX ADMIN — MAGNESIUM HYDROXIDE 30 ML: 1200 LIQUID ORAL at 16:01

## 2024-01-23 RX ADMIN — SODIUM CHLORIDE: 9 INJECTION, SOLUTION INTRAVENOUS at 11:10

## 2024-01-23 RX ADMIN — Medication 20 MG: at 10:28

## 2024-01-23 RX ADMIN — Medication 2000 MG: at 18:29

## 2024-01-23 RX ADMIN — Medication 10 MG: at 10:48

## 2024-01-23 ASSESSMENT — PAIN SCALES - GENERAL
PAINLEVEL_OUTOF10: 0
PAINLEVEL_OUTOF10: 3
PAINLEVEL_OUTOF10: 7
PAINLEVEL_OUTOF10: 3
PAINLEVEL_OUTOF10: 7
PAINLEVEL_OUTOF10: 7
PAINLEVEL_OUTOF10: 0
PAINLEVEL_OUTOF10: 7
PAINLEVEL_OUTOF10: 7

## 2024-01-23 ASSESSMENT — PAIN DESCRIPTION - LOCATION
LOCATION: HIP

## 2024-01-23 ASSESSMENT — PAIN - FUNCTIONAL ASSESSMENT
PAIN_FUNCTIONAL_ASSESSMENT: PREVENTS OR INTERFERES SOME ACTIVE ACTIVITIES AND ADLS
PAIN_FUNCTIONAL_ASSESSMENT: NONE - DENIES PAIN
PAIN_FUNCTIONAL_ASSESSMENT: PREVENTS OR INTERFERES SOME ACTIVE ACTIVITIES AND ADLS
PAIN_FUNCTIONAL_ASSESSMENT: 0-10

## 2024-01-23 ASSESSMENT — PAIN DESCRIPTION - DESCRIPTORS
DESCRIPTORS: SHARP
DESCRIPTORS: ACHING;THROBBING
DESCRIPTORS: ACHING
DESCRIPTORS: ACHING;DISCOMFORT
DESCRIPTORS: STABBING

## 2024-01-23 ASSESSMENT — PAIN DESCRIPTION - PAIN TYPE: TYPE: SURGICAL PAIN

## 2024-01-23 ASSESSMENT — PAIN DESCRIPTION - ORIENTATION
ORIENTATION: RIGHT

## 2024-01-23 ASSESSMENT — PAIN DESCRIPTION - FREQUENCY: FREQUENCY: CONTINUOUS

## 2024-01-23 ASSESSMENT — PAIN DESCRIPTION - ONSET: ONSET: ON-GOING

## 2024-01-23 NOTE — PLAN OF CARE
Problem: Discharge Planning  Goal: Discharge to home or other facility with appropriate resources  1/23/2024 1759 by Mariama Swan RN  Outcome: Progressing  Flowsheets (Taken 1/23/2024 1759)  Discharge to home or other facility with appropriate resources:   Identify barriers to discharge with patient and caregiver   Identify discharge learning needs (meds, wound care, etc)   Refer to discharge planning if patient needs post-hospital services based on physician order or complex needs related to functional status, cognitive ability or social support system  1/23/2024 1758 by Mariama Swan RN  Outcome: Progressing     Problem: Pain  Goal: Verbalizes/displays adequate comfort level or baseline comfort level  1/23/2024 1759 by Mariama Swan RN  Outcome: Progressing  Flowsheets (Taken 1/23/2024 1759)  Verbalizes/displays adequate comfort level or baseline comfort level:   Encourage patient to monitor pain and request assistance   Assess pain using appropriate pain scale   Implement non-pharmacological measures as appropriate and evaluate response  1/23/2024 1758 by Mariama Swan RN  Outcome: Progressing     Problem: Safety - Adult  Goal: Free from fall injury  1/23/2024 1759 by Mariama Swan RN  Outcome: Progressing  Flowsheets (Taken 1/23/2024 1759)  Free From Fall Injury: Instruct family/caregiver on patient safety  1/23/2024 1758 by Mariama Swan RN  Outcome: Progressing     Problem: Cardiovascular - Adult  Goal: Maintains optimal cardiac output and hemodynamic stability  Outcome: Progressing  Flowsheets (Taken 1/23/2024 1759)  Maintains optimal cardiac output and hemodynamic stability:   Monitor blood pressure and heart rate   Assess for signs of decreased cardiac output     Problem: Skin/Tissue Integrity - Adult  Goal: Incisions, wounds, or drain sites healing without S/S of infection  Outcome: Progressing  Flowsheets (Taken 1/23/2024 1759)  Incisions, Wounds, or Drain Sites Healing Without Sign and  Symptoms of Infection:   ADMISSION and DAILY: Assess and document risk factors for pressure ulcer development   TWICE DAILY: Assess and document dressing/incision, wound bed, drain sites and surrounding tissue     Problem: Musculoskeletal - Adult  Goal: Return mobility to safest level of function  Outcome: Progressing  Flowsheets (Taken 1/23/2024 1759)  Return Mobility to Safest Level of Function:   Assess patient stability and activity tolerance for standing, transferring and ambulating with or without assistive devices   Assist with transfers and ambulation using safe patient handling equipment as needed   Ensure adequate protection for wounds/incisions during mobilization   Obtain physical therapy/occupational therapy consults as needed  Goal: Maintain proper alignment of affected body part  Outcome: Progressing  Flowsheets (Taken 1/23/2024 1759)  Maintain proper alignment of affected body part: Support and protect limb and body alignment per provider's orders     Problem: Gastrointestinal - Adult  Goal: Minimal or absence of nausea and vomiting  Outcome: Progressing  Flowsheets (Taken 1/23/2024 1759)  Minimal or absence of nausea and vomiting:   Administer IV fluids as ordered to ensure adequate hydration   Provide nonpharmacologic comfort measures as appropriate   Administer ordered antiemetic medications as needed  Goal: Maintains or returns to baseline bowel function  Outcome: Progressing  Flowsheets (Taken 1/23/2024 1759)  Maintains or returns to baseline bowel function:   Assess bowel function   Administer IV fluids as ordered to ensure adequate hydration   Encourage oral fluids to ensure adequate hydration   Administer ordered medications as needed   Encourage mobilization and activity  Goal: Maintains adequate nutritional intake  Outcome: Progressing  Flowsheets (Taken 1/23/2024 1759)  Maintains adequate nutritional intake:   Monitor percentage of each meal consumed   Identify factors contributing to

## 2024-01-23 NOTE — PROGRESS NOTES
Pt admitted to [facility] room 16 and oriented to unit. SCD sleeves applied. Nares swabbed. Pt verbalized permission for first name, last initial and physicians name on white board. SDS board and discharge criteria explained, pt and family verbalized understanding. Pt denies thoughts of harming self or others. Call light in reach. Family at the bedside.  Pro [phone]

## 2024-01-23 NOTE — ANESTHESIA POSTPROCEDURE EVALUATION
Department of Anesthesiology  Postprocedure Note    Patient: Dominique Pink  MRN: [id number]  YOB: 1955  Date of evaluation: 1/23/2024    Procedure Summary       Date: 01/23/24 Room / Location: [facility] OR [address] [place] [address] OR    Anesthesia Start: 0957 Anesthesia Stop: 1146    Procedure: Right Total Hip Anterior Approach (Right: Hip) Diagnosis:       Osteoarthritis of right hip, unspecified osteoarthritis type      (Osteoarthritis of right hip, unspecified osteoarthritis type [M16.11])    Surgeons: Placido Ivy MD Responsible Provider: Michael Guerrero MD    Anesthesia Type: spinal ASA Status: 2            Anesthesia Type: No value filed.    Rin Phase I: Rin Score: 10    Rin Phase II: Rin Score: 10    Anesthesia Post Evaluation    Patient location during evaluation: PACU  Patient participation: complete - patient participated  Level of consciousness: awake  Airway patency: patent  Nausea & Vomiting: no vomiting and no nausea  Cardiovascular status: hemodynamically stable  Respiratory status: acceptable and nasal cannula  Hydration status: stable  Pain management: adequate    No notable events documented.

## 2024-01-23 NOTE — ANESTHESIA PRE PROCEDURE
Department of Anesthesiology  Preprocedure Note       Name:  Dominique Pink   Age:  68 y.o.  :  1955                                          MRN:  [id number]         Date:  2024      Surgeon: Surgeon(s):  Placido Ivy MD    Procedure: Procedure(s):  Right Total Hip Anterior Approach    Medications prior to admission:   Prior to Admission medications    Medication Sig Start Date End Date Taking? Authorizing Provider   b complex vitamins capsule Take 1 capsule by mouth daily    Augustine Cohen MD   lidocaine (XYLOCAINE) 5 % ointment Apply topically as needed up to 5 times a day 23   Chrissie Espinal MD   baclofen (LIORESAL) 20 MG tablet Take 1 tablet by mouth 3 times daily as needed (muscle spasm/pain) 23   Chrissie Espinal MD   diazePAM (VALIUM) 5 MG tablet Take 1 tablet by mouth nightly as needed for Anxiety for up to 30 days. 23  Chrissie Espinal MD   psyllium (METAMUCIL) 28.3 % POWD powder Take 3.4 g by mouth as needed    Augustine Cohen MD   dicyclomine (BENTYL) 10 MG capsule Take 1 capsule by mouth 3 times daily as needed (intestinal spasm and abdominal pain)  Patient taking differently: Take 1 capsule by mouth 3 times daily as needed (intestinal spasm and abdominal pain) PRN 3/8/23   Chrissie Espinal MD   vitamin D (CHOLECALCIFEROL) 1000 UNIT TABS tablet Take 1 tablet by mouth daily    Augustine Cohen MD   Multiple Vitamins-Minerals (THERAPEUTIC MULTIVITAMIN-MINERALS) tablet Take 1 tablet by mouth daily    Augustine Cohen MD       Current medications:    Current Facility-Administered Medications   Medication Dose Route Frequency Provider Last Rate Last Admin   • 0.9 % sodium chloride infusion   IntraVENous Continuous Placido Ivy  mL/hr at 24 0804 New Bag at 24 0804   • sodium chloride flush 0.9 % injection 5-40 mL  5-40 mL IntraVENous 2 times per day Placido Ivy MD       • sodium chloride flush

## 2024-01-23 NOTE — H&P
[facility]  History and Physical Update    Pt Name: Dominique Pink  MRN: [id number]  YOB: 1955  Date of evaluation: 1/23/2024    I have examined the patient and reviewed the H&P/Consult and there are no changes to the patient or plans.      Placido Ivy MD  Electronically signed 1/23/2024 at 7:29 AM

## 2024-01-23 NOTE — ANESTHESIA PROCEDURE NOTES
Peripheral Block    Patient location during procedure: OR  Reason for block: post-op pain management and at surgeon's request  Start time: 1/23/2024 10:25 AM  End time: 1/23/2024 10:30 AM  Staffing  Anesthesiologist: Michael Guerrero MD  Performed by: Michael Guerrero MD  Authorized by: Michael Guerrero MD    Preanesthetic Checklist  Completed: patient identified, IV checked, site marked, risks and benefits discussed, surgical/procedural consents, equipment checked, pre-op evaluation, timeout performed, anesthesia consent given, oxygen available, monitors applied/VS acknowledged, fire risk safety assessment completed and verbalized and blood product R/B/A discussed and consented  Peripheral Block   Patient position: supine  Prep: ChloraPrep  Provider prep: mask and sterile gloves  Patient monitoring: responsive to questions, oxygen, IV access and frequent blood pressure checks  Block type: PENG  Laterality: right  Injection technique: single-shot  Guidance: ultrasound guided    Needle   Needle type: insulated echogenic nerve stimulator needle   Needle gauge: 20 G  Needle localization: ultrasound guidance  Needle insertion depth: 4 cm  Test dose: negative  Needle length: 10 cm  Assessment   Injection assessment: local visualized surrounding nerve on ultrasound  Paresthesia pain: none  Slow fractionated injection: yes  Hemodynamics: stableno  Outcomes: uncomplicated and patient tolerated procedure well    Medications Administered  ropivacaine (NAROPIN) injection 0.2% - Perineural   20 mL - 1/23/2024 10:25:00 AM

## 2024-01-23 NOTE — PROGRESS NOTES
Pt returned to [facility] room 16. Vitals and assessment as charted. 0.9 infusing, @500ml to count from PACU. Pt has gingerale. Family at the bedside. Pt and family verbalized understanding of discharge criteria and call light use. Call light in reach.

## 2024-01-23 NOTE — ANESTHESIA PROCEDURE NOTES
Spinal Block    Patient location during procedure: OR  End time: 1/23/2024 10:23 AM  Reason for block: at surgeon's request  Staffing  Performed: resident/CRNA   Resident/CRNA: Tapan Orellana APRN - CRNA  Performed by: Tapan Orellana APRN - CRNA  Authorized by: Michael Guerrero MD    Spinal Block  Patient position: sitting  Prep: ChloraPrep  Patient monitoring: cardiac monitor, continuous pulse ox, frequent blood pressure checks and oxygen  Approach: midline  Location: L3/L4  Provider prep: mask and sterile gown  Needle  Needle type: Pencan   Needle gauge: 25 G  Needle length: 3.5 in  Assessment  Sensory level: T6  Swirl obtained: Yes  CSF: clear  Attempts: 2  Hemodynamics: stable

## 2024-01-23 NOTE — PROGRESS NOTES
1145  - pt arrives to pacu, respirations unlabored on room air, pt denies pain, VSS    1200 -  xray at bedside, pt denies pain    1215 - pt meets criteria for discharge from pacu at this time, pt transported to [facility] in stable condition, no bed at this time

## 2024-01-23 NOTE — ANESTHESIA PROCEDURE NOTES
Peripheral Block    Patient location during procedure: OR  Reason for block: post-op pain management and at surgeon's request  Start time: 1/23/2024 10:30 AM  End time: 1/23/2024 10:32 AM  Staffing  Anesthesiologist: Michael Guerrero MD  Performed by: Michael Guerrero MD  Authorized by: Michael Guerrero MD    Preanesthetic Checklist  Completed: patient identified, IV checked, site marked, risks and benefits discussed, surgical/procedural consents, equipment checked, pre-op evaluation, timeout performed, anesthesia consent given, oxygen available, monitors applied/VS acknowledged, fire risk safety assessment completed and verbalized and blood product R/B/A discussed and consented  Peripheral Block   Patient position: supine  Prep: ChloraPrep  Provider prep: mask and sterile gloves  Patient monitoring: responsive to questions, oxygen, IV access and frequent blood pressure checks  Block type: Femoral lateral cutaneous  Laterality: right  Injection technique: single-shot  Guidance: other    Needle   Needle type: insulated echogenic nerve stimulator needle   Needle gauge: 20 G  Needle insertion depth: 1 cm  Test dose: negative  Needle length: 10 cm  Assessment   Injection assessment: negative aspiration for heme  Paresthesia pain: none  Slow fractionated injection: yes  Hemodynamics: stableno  Outcomes: uncomplicated and patient tolerated procedure well    Medications Administered  ropivacaine (NAROPIN) injection 0.5% - Perineural   6 mL - 1/23/2024 10:30:00 AM

## 2024-01-23 NOTE — BRIEF OP NOTE
Brief Postoperative Note      Patient: Dominique Pink  YOB: 1955  MRN: [id number]    Date of Procedure: 1/23/2024    Pre-Op Diagnosis Codes:     * Osteoarthritis of right hip, unspecified osteoarthritis type [M16.11]    Post-Op Diagnosis: Same       Procedure(s):  Right Total Hip Anterior Approach    Surgeon(s):  Placido Ivy MD    Assistant:  Physician Assistant: William Rowe PA    Anesthesia: Spinal    Estimated Blood Loss (mL): 300     Complications: None    Specimens:   * No specimens in log *    Implants:  Implant Name Type Inv. Item Serial No.  Lot No. LRB No. Used Action   COVER H ACET INTFIT THRD WATERTIGHT FOR REFLCT SYS - [id number]  COVER H ACET INTFIT THRD WATERTIGHT FOR REFLCT SYS  [facility] [id number] Right 1 Implanted   SHELL ACET [id number] STD HIP HA NO H SLD R3 - [id number]  SHELL ACET [id number] STD HIP HA NO H SLD R3  [employer] SoftwareUC [facility] [id number] Right 1 Implanted   LINER ACET OD52MM ID36MM TAPR REGION THK4.3MM LOAD BEAR - [id number]  LINER ACET OD52MM ID36MM TAPR REGION THK4.3MM LOAD BEAR  [employer] SoftwareUC [facility] [id number] Right 1 Implanted   STEM FEM SZ 6 CCD 126DEG LAT 12/14 TAPR TI HA CEMENTLESS - [id number]  STEM FEM SZ 6 CCD 126DEG LAT 12/14 TAPR TI HA CEMENTLESS  [employer] Software[employer] [facility] [id number] Right 1 Implanted   HEAD FEM [id number] +0MM OFFSET CO CHROM 12/14 TAPR - [id number]  HEAD FEM [id number] +0MM OFFSET CO CHROM 12/14 TAPR  [facility] [employer] [facility] [id number] Right 1 Implanted         Drains: * No LDAs found *    Findings: See operative dictation      Electronically signed by Placido Ivy MD on 1/23/2024 at 11:34 AM

## 2024-01-23 NOTE — PROGRESS NOTES
Pt  arrived to 7k 06 from pacu alert and oriented x4 c/o 7/10 right hip pain, iv infusing into left arm, fiance at bedside. Oriented pt to room went over admission orders

## 2024-01-24 VITALS
RESPIRATION RATE: 16 BRPM | HEIGHT: 68 IN | DIASTOLIC BLOOD PRESSURE: 62 MMHG | OXYGEN SATURATION: 96 % | HEART RATE: 82 BPM | TEMPERATURE: 98 F | SYSTOLIC BLOOD PRESSURE: 113 MMHG | BODY MASS INDEX: 20.46 KG/M2 | WEIGHT: 135 LBS

## 2024-01-24 LAB
DEPRECATED RDW RBC AUTO: 43.8 FL (ref 35–45)
ERYTHROCYTE [DISTWIDTH] IN BLOOD BY AUTOMATED COUNT: 12.2 % (ref 11.5–14.5)
HCT VFR BLD AUTO: 37.4 % (ref 37–47)
HGB BLD-MCNC: 12.3 GM/DL (ref 12–16)
MCH RBC QN AUTO: 32.1 PG (ref 26–33)
MCHC RBC AUTO-ENTMCNC: 32.9 GM/DL (ref 32.2–35.5)
MCV RBC AUTO: 97.7 FL (ref 81–99)
PLATELET # BLD AUTO: 180 THOU/MM3 (ref 130–400)
PMV BLD AUTO: 9.4 FL (ref 9.4–12.4)
RBC # BLD AUTO: 3.83 MILL/MM3 (ref 4.2–5.4)
WBC # BLD AUTO: 12.7 THOU/MM3 (ref 4.8–10.8)

## 2024-01-24 PROCEDURE — 6360000002 HC RX W HCPCS: Performed by: ORTHOPAEDIC SURGERY

## 2024-01-24 PROCEDURE — 2580000003 HC RX 258: Performed by: ORTHOPAEDIC SURGERY

## 2024-01-24 PROCEDURE — 97535 SELF CARE MNGMENT TRAINING: CPT

## 2024-01-24 PROCEDURE — 85027 COMPLETE CBC AUTOMATED: CPT

## 2024-01-24 PROCEDURE — 97530 THERAPEUTIC ACTIVITIES: CPT

## 2024-01-24 PROCEDURE — 36415 COLL VENOUS BLD VENIPUNCTURE: CPT

## 2024-01-24 PROCEDURE — 97162 PT EVAL MOD COMPLEX 30 MIN: CPT

## 2024-01-24 PROCEDURE — 97116 GAIT TRAINING THERAPY: CPT

## 2024-01-24 PROCEDURE — 6370000000 HC RX 637 (ALT 250 FOR IP): Performed by: ORTHOPAEDIC SURGERY

## 2024-01-24 PROCEDURE — 97166 OT EVAL MOD COMPLEX 45 MIN: CPT

## 2024-01-24 RX ORDER — ASPIRIN 325 MG
325 TABLET, DELAYED RELEASE (ENTERIC COATED) ORAL 2 TIMES DAILY
Qty: 30 TABLET | Refills: 0 | Status: SHIPPED | OUTPATIENT
Start: 2024-01-24

## 2024-01-24 RX ORDER — HYDROCODONE BITARTRATE AND ACETAMINOPHEN 5; 325 MG/1; MG/1
1 TABLET ORAL EVERY 4 HOURS PRN
Qty: 42 TABLET | Refills: 0 | Status: SHIPPED | OUTPATIENT
Start: 2024-01-24 | End: 2024-01-31

## 2024-01-24 RX ORDER — OMEPRAZOLE 40 MG/1
40 CAPSULE, DELAYED RELEASE ORAL
Qty: 30 CAPSULE | Refills: 0 | Status: SHIPPED | OUTPATIENT
Start: 2024-01-24

## 2024-01-24 RX ADMIN — HYDROCODONE BITARTRATE AND ACETAMINOPHEN 1 TABLET: 5; 325 TABLET ORAL at 09:58

## 2024-01-24 RX ADMIN — SODIUM CHLORIDE: 9 INJECTION, SOLUTION INTRAVENOUS at 01:05

## 2024-01-24 RX ADMIN — HYDROMORPHONE HYDROCHLORIDE 0.5 MG: 1 INJECTION, SOLUTION INTRAMUSCULAR; INTRAVENOUS; SUBCUTANEOUS at 03:21

## 2024-01-24 RX ADMIN — SODIUM CHLORIDE, PRESERVATIVE FREE 10 ML: 5 INJECTION INTRAVENOUS at 10:00

## 2024-01-24 RX ADMIN — ASPIRIN 325 MG: 325 TABLET, COATED ORAL at 09:59

## 2024-01-24 RX ADMIN — Medication 2000 MG: at 02:47

## 2024-01-24 RX ADMIN — HYDROMORPHONE HYDROCHLORIDE 0.5 MG: 1 INJECTION, SOLUTION INTRAMUSCULAR; INTRAVENOUS; SUBCUTANEOUS at 08:00

## 2024-01-24 RX ADMIN — HYDROCODONE BITARTRATE AND ACETAMINOPHEN 1 TABLET: 5; 325 TABLET ORAL at 14:48

## 2024-01-24 ASSESSMENT — PAIN DESCRIPTION - LOCATION
LOCATION: HIP
LOCATION: HIP;LEG

## 2024-01-24 ASSESSMENT — PAIN DESCRIPTION - DESCRIPTORS
DESCRIPTORS: ACHING;DISCOMFORT
DESCRIPTORS: ACHING
DESCRIPTORS: ACHING;SHOOTING
DESCRIPTORS: ACHING;DISCOMFORT

## 2024-01-24 ASSESSMENT — PAIN SCALES - GENERAL
PAINLEVEL_OUTOF10: 6
PAINLEVEL_OUTOF10: 2
PAINLEVEL_OUTOF10: 7
PAINLEVEL_OUTOF10: 4
PAINLEVEL_OUTOF10: 7
PAINLEVEL_OUTOF10: 6

## 2024-01-24 ASSESSMENT — PAIN DESCRIPTION - ORIENTATION
ORIENTATION: RIGHT

## 2024-01-24 ASSESSMENT — PAIN - FUNCTIONAL ASSESSMENT
PAIN_FUNCTIONAL_ASSESSMENT: PREVENTS OR INTERFERES SOME ACTIVE ACTIVITIES AND ADLS
PAIN_FUNCTIONAL_ASSESSMENT: ACTIVITIES ARE NOT PREVENTED
PAIN_FUNCTIONAL_ASSESSMENT: PREVENTS OR INTERFERES SOME ACTIVE ACTIVITIES AND ADLS
PAIN_FUNCTIONAL_ASSESSMENT: PREVENTS OR INTERFERES SOME ACTIVE ACTIVITIES AND ADLS

## 2024-01-24 NOTE — PROGRESS NOTES
Pt was anointed.    01/24/24 1650   Encounter Summary   Encounter Overview/Reason  Initial Encounter   Service Provided For: Patient   Referral/Consult From: [facility]   Support System Family members   Last Encounter  01/24/24  (Anointed.)   Complexity of Encounter Low   Begin Time 1330   End Time  1337   Total Time Calculated 7 min   Spiritual/Emotional needs   Type Spiritual Support   Rituals, Rites and Sacraments   Type Anointing   Assessment/Intervention/Outcome   Assessment Hopeful   Intervention Empowerment

## 2024-01-24 NOTE — PROGRESS NOTES
[facility]  INPATIENT PHYSICAL THERAPY  EVALUATION  [facility] ORTHOPEDICS 7K - 7K-06/006-A    Time In: 0737  Time Out: 0757  Timed Code Treatment Minutes: 12 Minutes  Minutes: 20          Date: 2024  Patient Name: Dominique Pink,  Gender:  female        MRN: [id number]  : 1955  (68 y.o.)      Referring Practitioner: Placido Ivy MD  Diagnosis: Primary osteoarthritis of right hip  Additional Pertinent Hx: 2024  9:56 AM   Right Total Hip Anterior Approach     Placido Ivy MD     Restrictions/Precautions:  Restrictions/Precautions: Fall Risk  Position Activity Restriction  Hip Precautions: Anterior hip precautions    Subjective:  Chart Reviewed: Yes  Patient assessed for rehabilitation services?: Yes  Subjective: RN approved session. Pt pleasant and agreeable to therapy    General:  Overall Orientation Status: Within Functional Limits  Vision: Within Functional Limits  Hearing: Within functional limits       Pain: 6-7/10: R hip     Vitals: Vitals not assessed per clinical judgement, see nursing flowsheet    Social/Functional History:    Lives With: Significant other  Type of Home: House  Home Layout: One level  Home Access: Stairs to enter with rails  Entrance Stairs - Number of Steps: 4  Entrance Stairs - Rails: Left  Home Equipment: Walker, 4 wheeled, Cane     Bathroom Shower/Tub: Walk-in shower  Bathroom Toilet: Standard  Bathroom Equipment: Grab bars in shower, Toilet raiser       ADL Assistance: Independent  Homemaking Assistance: Independent  Ambulation Assistance: Independent  Transfer Assistance: Independent          Additional Comments: ind with 4ww for the last 3 months d/t pain - used cane outside of home    OBJECTIVE:  Range of Motion:  Right Lower Extremity: WFL  Left Lower Extremity: WFL    Strength:  Right Lower Extremity: Impaired - d/t surgery   Left Lower Extremity: WFL    Balance:  Static Sitting Balance:  Stand By Assistance  Static Standing Balance:  Stand By Assistance    Bed Mobility:  Supine to Sit: Stand By Assistance, X 1, with rail    Transfers:  Sit to Stand: Stand By Assistance  Stand to Sit:Stand By Assistance    Ambulation:  Stand By Assistance  Distance: 200'   Surface: Level Tile  Device:Rolling Walker  Gait Deviations:  Slow Kimberly, Decreased Step Length on Left, Decreased Weight Shift Right, and Decreased Gait Speed  Step-through gait pattern     Stairs:  Contact Guard Assistance, with verbal cues   Number of Steps: 4  Height: 6\" step with rail on L; HH on R - to mimic home setup    Functional Outcome Measures: Completed  AM-PAC Inpatient Mobility Raw Score : 18  AM-PAC Inpatient T-Scale Score : 43.63  Modified Chase Scale:  Not Applicable    ASSESSMENT:  Activity Tolerance:  Patient tolerance of  treatment: good.       Treatment Initiated: Treatment and education initiated within context of evaluation.  Evaluation time included review of current medical information, gathering information related to past medical, social and functional history, completion of standardized testing, formal and informal observation of tasks, assessment of data and development of plan of care and goals.  Treatment time included skilled education and facilitation of tasks to increase safety and independence with functional mobility for improved independence and quality of life.    Assessment:  Body Structures, Functions, Activity Limitations Requiring Skilled Therapeutic Intervention: Decreased functional mobility , Decreased strength, Decreased endurance, Decreased balance, Decreased posture  Assessment: Dominique Pink is a 68 y.o. female that presents with R NICHOLAS. Pt demonstrates a decrease in baseline by way of bed mobility, transfers and ambulation secondary to decreased activity tolerance, strength, fatigue, and balance deficits. Pt will benefit from skilled PT services throughout admission and beyond hospital discharge for improvements in functional mobility

## 2024-01-24 NOTE — CARE COORDINATION
Case Management Assessment  Initial Evaluation    Date/Time of Evaluation: 1/24/2024 12:09 PM  Assessment Completed by: Christina Lamb RN    If patient is discharged prior to next notation, then this note serves as note for discharge by case management.    Patient Name: Dominique Pink                   YOB: 1955  Diagnosis: Osteoarthritis of right hip, unspecified osteoarthritis type [M16.11]  Primary osteoarthritis of right hip [M16.11]                   Date / Time: 1/23/2024  7:23 AM  Location: [address] [place] [address]     Patient Admission Status: Outpatient in a bed   Readmission Risk Low 0-14, Mod 15-19), High > 20: No data recorded  Current PCP: Chrissie Espinal MD  PCP verified by ? Yes    Chart Reviewed: Yes      History Provided by: Patient  Patient Orientation: Alert and Oriented    Patient Cognition: Alert    Hospitalization in the last 30 days (Readmission):  No    If yes, Readmission Assessment in  Navigator will be completed.    Advance Directives:      Code Status: Full Code   Patient's Primary Decision Maker is: Legal Next of Kin    Primary Decision Maker: Sharla Jamie - Child - [phone]    Secondary Decision Maker: Santino Jonesnelori - Child - [phone]    Discharge Planning:    Patient lives with: Spouse/Significant Other   Type of Home: House  Primary Care Giver: Self  Patient Support Systems include: Spouse/Significant Other   Current Financial resources: Medicare  Current community resources: None  Current services prior to admission: Durable Medical Equipment            Current DME: Walker, Cane            Type of Home Care services:  None    ADLS  Prior functional level: Independent in ADLs/IADLs  Current functional level: Assistance with the following:, Housework, Shopping, Cooking, Mobility    Family can provide assistance at DC: Yes  Would you like Case Management to discuss the discharge plan with any other family members/significant others, and if so, who? No  Plans to  Return to Present Housing: Yes  Other Identified Issues/Barriers to RETURNING to current housing: none  Potential Assistance needed at discharge: Outpatient PT/OT            Potential DME:    Patient expects to discharge to: House  Plan for transportation at discharge: Family    Financial    Payor: [employer] MEDICARE / Plan: [employer] MEDICARE-ADVANTAGE PPO / Product Type: Medicare /     Does insurance require precert for SNF: Yes    Potential assistance Purchasing Medications: No  Meds-to-Beds request: Yes      [id number] - [place], [place] - [address] [phone] - F [phone]  [address] [address]  Phone: [phone] Fax: [phone]      Notes:    Factors facilitating achievement of predicted outcomes: Family support, Caregiver support, Motivated, Cooperative, and Good insight into deficits    Barriers to discharge: Pain, Limited family support, and Decreased endurance    POD 1, begin therapy, drsg care, pain control. Poss home today after therapy.  Additional Case Management Notes: planned surgery by Dr Ivy    Procedure: 1/23 Right Total Hip Anterior Approach     The Plan for Transition of Care is related to the following treatment goals of Osteoarthritis of right hip, unspecified osteoarthritis type [M16.11]  Primary osteoarthritis of right hip [M16.11]    Patient Goals/Plan/Treatment Preferences: Met with Dominique and her fiance Pro. They reside home, insurance verified, post op day 1, worked with therapy, planning OP PT at [facility]. She has needed DME (see list) and can afford medicines.   Referral faxed and called over to [facility] at [facility]. Home later today.        Transportation/Food Security/Housekeeping Addressed: No issues identified.     Christina Lamb RN  Case Management Department

## 2024-01-24 NOTE — PLAN OF CARE
Problem: Discharge Planning  Goal: Discharge to home or other facility with appropriate resources  1/23/2024 2149 by Kimberley Fay RN  Outcome: Progressing  Flowsheets (Taken 1/23/2024 2149)  Discharge to home or other facility with appropriate resources:   Identify barriers to discharge with patient and caregiver   Identify discharge learning needs (meds, wound care, etc)   Arrange for needed discharge resources and transportation as appropriate     Problem: Pain  Goal: Verbalizes/displays adequate comfort level or baseline comfort level  1/23/2024 2149 by Kimberley Fay RN  Outcome: Progressing  Flowsheets (Taken 1/23/2024 2149)  Verbalizes/displays adequate comfort level or baseline comfort level:   Encourage patient to monitor pain and request assistance   Assess pain using appropriate pain scale   Administer analgesics based on type and severity of pain and evaluate response   Implement non-pharmacological measures as appropriate and evaluate response     Problem: Safety - Adult  Goal: Free from fall injury  1/23/2024 2149 by Kimberley Fay RN  Outcome: Progressing  Flowsheets (Taken 1/23/2024 2149)  Free From Fall Injury: Instruct family/caregiver on patient safety     Problem: Cardiovascular - Adult  Goal: Maintains optimal cardiac output and hemodynamic stability  1/23/2024 2149 by Kimberley Fay RN  Outcome: Progressing  Flowsheets (Taken 1/23/2024 2149)  Maintains optimal cardiac output and hemodynamic stability:   Monitor blood pressure and heart rate   Assess for signs of decreased cardiac output     Problem: Skin/Tissue Integrity - Adult  Goal: Incisions, wounds, or drain sites healing without S/S of infection  1/23/2024 2149 by Kimberley Fay RN  Outcome: Progressing  Flowsheets (Taken 1/23/2024 2149)  Incisions, Wounds, or Drain Sites Healing Without Sign and Symptoms of Infection:   TWICE DAILY: Assess and document skin integrity   Implement wound care per orders     Problem:  Musculoskeletal - Adult  Goal: Return mobility to safest level of function  1/23/2024 2149 by Kimberley Fay RN  Outcome: Progressing  Flowsheets (Taken 1/23/2024 2149)  Return Mobility to Safest Level of Function:   Assess patient stability and activity tolerance for standing, transferring and ambulating with or without assistive devices   Assist with transfers and ambulation using safe patient handling equipment as needed   Ensure adequate protection for wounds/incisions during mobilization   Instruct patient/family in ordered activity level     Problem: Musculoskeletal - Adult  Goal: Maintain proper alignment of affected body part  1/23/2024 2149 by Kimberley Fay RN  Outcome: Progressing  Flowsheets (Taken 1/23/2024 2149)  Maintain proper alignment of affected body part: Support and protect limb and body alignment per provider's orders     Problem: Gastrointestinal - Adult  Goal: Minimal or absence of nausea and vomiting  1/23/2024 2149 by Kimberley Fay RN  Outcome: Progressing  Flowsheets (Taken 1/23/2024 2149)  Minimal or absence of nausea and vomiting:   Administer IV fluids as ordered to ensure adequate hydration   Provide nonpharmacologic comfort measures as appropriate     Problem: Gastrointestinal - Adult  Goal: Maintains or returns to baseline bowel function  1/23/2024 2149 by Kimberley Fay RN  Outcome: Progressing  Flowsheets (Taken 1/23/2024 2149)  Maintains or returns to baseline bowel function:   Assess bowel function   Encourage mobilization and activity     Problem: Gastrointestinal - Adult  Goal: Maintains adequate nutritional intake  1/23/2024 2149 by Kimberley Fay RN  Outcome: Progressing  Flowsheets (Taken 1/23/2024 2149)  Maintains adequate nutritional intake:   Monitor percentage of each meal consumed   Monitor intake and output, weight and lab values     Problem: Infection - Adult  Goal: Absence of infection at discharge  1/23/2024 2149 by Kimberley Fay RN  Outcome:

## 2024-01-24 NOTE — PROGRESS NOTES
Patient has bottle of home medication diazepam at bedside. Diazepam count done with Pierce BURNS. Home medication secured and locked in [facility] cabinet. Will pass information to [facility] RN on report tomorrow.

## 2024-01-24 NOTE — DISCHARGE INSTRUCTIONS
Follow up in 2 weeks  Wbat with walker  May shower  Leave dressing on  No hip precautions  Scds both legs  [place] script sent to pharmacy from [facility]

## 2024-01-24 NOTE — OP NOTE
[place] [id number]                                OPERATIVE REPORT    PATIENT NAME: TRAVIS REYES                :        1955  MED REC NO:   [id number]                           ROOM:       0006  ACCOUNT NO:   [id number]                           ADMIT DATE: 2024  PROVIDER:     Placido Ivy M.D.    DATE OF PROCEDURE:  2024    ATTENDING SURGEON:  Placido Ivy MD    ASSISTANT:  William Rowe PA-C    PREOPERATIVE DIAGNOSIS:  Right hip degenerative joint disease.    POSTOPERATIVE DIAGNOSIS:  Right hip degenerative joint disease.    OPERATION PERFORMED:  Right total hip replacement, anterior approach.    ANESTHESIA:  Spinal plus block.    SPECIMENS TO PATHOLOGY:  None.    IMPLANTS:  These were from [employer] systems:  1.  Size 52 mm outer diameter no hole R3 acetabular shell with apex hole  eliminator.  2.  Size 36 mm inner diameter polyethylene liner.  3.  Size 6 lateralized cementless, collarless polar stem.  4.  Size 36 mm +0 cobalt chrome femoral head.    COMPLICATIONS:  None.    TOURNIQUET TIME:  None.    ESTIMATED BLOOD LOSS:  300 mL.    ANTIBIOTICS:  Preoperative Ancef, intraoperative vancomycin powder, and  Irrisept.    HISTORY OF PRESENT ILLNESS:  The patient is a pleasant 68-year-old  female who was referred to me for complaints of pain in her right hip,  increasing pain and problems, increasing stiffness, increasing  difficulties with activities of daily living including in and out of  chairs, up and down stairs, in and out of cars.  She has tried and  failed conservative measures including anti-inflammatory medications,  activity modifications, external assistive devices as well as home  exercise programs, injections, etc., and now she has opted to move on  surgical intervention of hip replacement on the right side.  Risks and  benefits including possible complications such as  was 5 or  6 mm proud.  I went ahead and re-trialed with a lateralized 0 ball,  reduced the hip and now had excellent stability.    Hip was then dislocated.  We removed the broach, impacted the real stem  to our tidemark, impacted the real 36 +0 cobalt chrome head, reduced the  hip, had no instability.    Hip was then copiously irrigated with a combination of normal saline and  Irrisept, sprinkled with 500 mg of vancomycin powder.  Capsule was  closed followed by injection of 1 gm of tranexamic acid.  Fascia was  closed followed by 2-0 Vicryl, ZipLine, Dermabond, and a dressing was  applied.    The patient remained awake throughout the entire procedure and taken to  the recovery room in satisfactory condition.  All needle and sponge  counts were correct.    William Rowe PA-C, assisted throughout the procedure with  positioning, draping, retraction, wound closure, dressing, and splint  application.        TATA MCCAIN M.D.    D: 01/23/2024 11:34:10       T: 01/23/2024 13:08:45     [email or web address]  Job#: [id number]     Doc#: [id number]    CC:

## 2024-01-24 NOTE — PROGRESS NOTES
[facility]  INPATIENT OCCUPATIONAL THERAPY  [facility] ORTHOPEDICS 7K  EVALUATION    Time:   Time In: 1041  Time Out: 1112  Timed Code Treatment Minutes: 23 Minutes  Minutes: 31          Date: 2024  Patient Name: Dominique Pink,   Gender: female      MRN: [id number]  : 1955  (68 y.o.)  Referring Practitioner: Placido Ivy MD  Diagnosis: Osteoarthritis of right hip  Additional Pertinent Hx: This 68 year old female is s/p Right Total Hip Anterior Approach by Dr. Ivy on 24.    Restrictions/Precautions:  Restrictions/Precautions: Fall Risk, Weight Bearing  Right Lower Extremity Weight Bearing: Weight Bearing As Tolerated  Position Activity Restriction  Hip Precautions: Anterior hip precautions    Subjective  Chart Reviewed: Yes, Orders, Progress Notes, History and Physical, Operative Notes  Patient assessed for rehabilitation services?: Yes    Subjective: RN okayed OT session. Upon arrival patient was sitting EOB. Pt was agreeable to OT session.    Pain: 5/10: Right Hip     Vitals: Vitals not assessed per clinical judgement, see nursing flowsheet.      Social/Functional History:  Lives With: Significant other  Type of Home: House  Home Layout: One level  Home Access: Stairs to enter with rails  Entrance Stairs - Number of Steps: 4  Entrance Stairs - Rails: Left  Home Equipment: Walker, 4 wheeled, Cane   Bathroom Shower/Tub: Walk-in shower  Bathroom Toilet: Standard  Bathroom Equipment: Grab bars in shower, Toilet raiser       ADL Assistance: Independent  Homemaking Assistance: Independent  Ambulation Assistance: Independent  Transfer Assistance: Independent    Active : Yes     Additional Comments: ind with 4ww for the last 3 months d/t pain - used cane outside of home    VISION:WFL    HEARING:  WFL    COGNITION: WFL    RANGE OF MOTION:  Bilateral Upper Extremity:  WFL    STRENGTH:  Bilateral Upper Extremity:  WFL    SENSATION:   WFL    ADL:   Upper Extremity Dressing:  information related to past medical, social and functional history, completion of standardized testing, formal and informal observation of tasks, assessment of data and development of plan of care and goals.  Treatment time included skilled education and facilitation of tasks to increase safety and independence with ADL's for improved functional independence and quality of life.    Discharge Recommendations:  Continue to assess pending progress, Home with assist PRN    Patient Education:     Patient Education  Education Given To: Patient  Education Provided: Role of Therapy, Plan of Care, ADL Adaptive Strategies, Transfer Training  Education Method: Demonstration, Verbal  Barriers to Learning: None  Education Outcome: Verbalized understanding, Continued education needed    Equipment Recommendations:  Equipment Needed: No  Other: Pt owns reacher and sock aide.    Plan:  Times Per Week: 5x  Times Per Day: Once a day  Current Treatment Recommendations: Strengthening, Balance training, Functional mobility training, Safety education & training, Equipment evaluation, education, & procurement, Self-Care / ADL, Patient/Caregiver education & training, Home management training, Endurance training.  See long-term goal time frame for expected duration of plan of care.  If no long-term goals established, a short length of stay is anticipated.    Goals:  Patient goals : Go Home  Short Term Goals  Time Frame for Short Term Goals: Until Discharge  Short Term Goal 1: Pt will dynamic standing task x 5 minutes with SBA and 2 UE release to increase indep and endurance with all sinkside grooming.  Short Term Goal 2: Pt will complete functional mobility to/from BR and HH distances with SBA to increase indep and endurance with all toileting.  Short Term Goal 3: Pt will complete LB dressing with LHAE PRN, SBA, and min vcs for safety to increase occupational performance in home environment.    AM-PAC Inpatient Daily Activity Raw Score:

## 2024-01-24 NOTE — PROGRESS NOTES
Progress Note    Subjective:     Post-Operative Day: 1 Status Post right Total Hip Arthroplasty  Systemic or Specific Complaints:No Complaints    Objective:   VITALS:  BP (!) 102/58   Pulse 65   Temp 97.8 °F (36.6 °C) (Oral)   Resp 17   Ht 1.727 m (5' 8\")   Wt 61.2 kg (135 lb)   SpO2 97%   BMI 20.53 kg/m²     General: alert, appears stated age, and cooperative   Wound: Wound clean and dry no evidence of infection.   DVT Exam: No evidence of DVT seen on physical exam.   Abdomen soft and non tender  NVI in lower extremity. Thigh swollen but soft. Moving foot and ankle.      Data Review  CBC:   Lab Results   Component Value Date/Time    WBC 7.2 01/04/2024 12:15 PM    RBC 4.63 01/04/2024 12:15 PM    RBC 4.51 11/02/2011 01:00 PM    HGB 15.0 01/04/2024 12:15 PM    HCT 44.4 01/04/2024 12:15 PM     01/04/2024 12:15 PM    INR 0.95 06/22/2018 02:50 PM     BMP:   Lab Results   Component Value Date/Time     01/04/2024 12:15 PM    K 4.4 01/04/2024 12:15 PM     01/04/2024 12:15 PM    CO2 24 01/04/2024 12:15 PM    BUN 15 01/04/2024 12:15 PM    CREATININE 0.8 01/04/2024 12:15 PM    GLUCOSE 101 01/04/2024 12:15 PM    GLUCOSE 117 06/22/2018 02:50 PM       Assessment:     Status Post right Total Hip Arthroplasty. Doing well postoperatively.     Plan:      1:  Continue Total Hip Replacement protocol   2:  Continue Deep venous thrombosis prophylaxis  3:  Continue physical therapy   4:  Continue Pain Control  5:  Monitor Labs  6:  Discharge pending home today    William Rowe PA-C in collaboration with Dr. Ivy

## 2024-01-24 NOTE — CARE COORDINATION
1/24/24, 2:57 PM EST    DISCHARGE PLANNING EVALUATION    Consult for discharge plan, care manager has assessed, plans home with outpt PT, no other needs identified.

## 2024-01-24 NOTE — PROGRESS NOTES
Went over discharge instructions with Dominique and her . Went over medications and new prescription. Talked about follow up appointments and incisional care. Patients locked up Valium returned after counting with Haylie BURNS.

## 2024-01-25 NOTE — CARE COORDINATION
1/25/24, 7:15 AM EST    Patient goals/plan/ treatment preferences discussed by  and .  Patient goals/plan/ treatment preferences reviewed with patient/ family.  Patient/ family verbalize understanding of discharge plan and are in agreement with goal/plan/treatment preferences.  Understanding was demonstrated using the teach back method.  AVS provided by RN at time of discharge, which includes all necessary medical information pertaining to the patients current course of illness, treatment, post-discharge goals of care, and treatment preferences.     Services At/After Discharge: PT       IMM Letter  Observation Status Letter date given:: 01/24/24  Observation Status Letter time given:: 1140  Observation Status Letter given to Patient/Family/Significant other/Guardian/POA/by:: CM: Christina BURNS

## 2024-01-31 ENCOUNTER — OFFICE VISIT (OUTPATIENT)
Dept: FAMILY MEDICINE CLINIC | Age: 69
End: 2024-01-31
Payer: MEDICARE

## 2024-01-31 VITALS
RESPIRATION RATE: 16 BRPM | OXYGEN SATURATION: 96 % | BODY MASS INDEX: 21.37 KG/M2 | HEART RATE: 72 BPM | SYSTOLIC BLOOD PRESSURE: 100 MMHG | WEIGHT: 141 LBS | HEIGHT: 68 IN | TEMPERATURE: 97.8 F | DIASTOLIC BLOOD PRESSURE: 60 MMHG

## 2024-01-31 DIAGNOSIS — Z09 HOSPITAL DISCHARGE FOLLOW-UP: ICD-10-CM

## 2024-01-31 DIAGNOSIS — M16.11 PRIMARY OSTEOARTHRITIS OF RIGHT HIP: Primary | ICD-10-CM

## 2024-01-31 PROCEDURE — 99214 OFFICE O/P EST MOD 30 MIN: CPT | Performed by: NURSE PRACTITIONER

## 2024-01-31 PROCEDURE — 1123F ACP DISCUSS/DSCN MKR DOCD: CPT | Performed by: NURSE PRACTITIONER

## 2024-01-31 RX ORDER — ENOXAPARIN SODIUM 100 MG/ML
INJECTION SUBCUTANEOUS
COMMUNITY
Start: 2024-01-25

## 2024-01-31 ASSESSMENT — ENCOUNTER SYMPTOMS
VOMITING: 0
EYE PAIN: 0
NAUSEA: 0
CHEST TIGHTNESS: 0
SORE THROAT: 0
SHORTNESS OF BREATH: 0
ABDOMINAL PAIN: 0
COUGH: 0

## 2024-01-31 NOTE — PROGRESS NOTES
[facility] PROFESSIONAL [facility] MEDICINE  [address]  [facility] [address]  Dept: [phone]  Loc: [phone]     Dominique Pink (:  1955) is a 68 y.o. female, here for evaluation of the following chief complaint(s):  Follow-Up from Hospital (Osteoarthritis and hip replacement of right hip.  Pt stated she is in pain and pain level is at a 5 unless she's had pain medication.  Stated she starts PT on this Friday over at [facility]. [facility].  Follow up with [facility] on .  Pt denies any concerns today and each day she feels a little bit better. )      ASSESSMENT/PLAN:  1. Primary osteoarthritis of right hip  2. Hospital discharge follow-up    Will be starting PT. Has follow up with surgeon. Pain is manageable. Will call with any concerns. Regular follow up as scheduled.      SUBJECTIVE/OBJECTIVE:  Patient presents for follow up from hospital discharge after hip surgery with Dr Ivy. Has follow up with Dr Ivy on 2024. Pain is manageable. No fever or chills. Starting PT on Friday. No other concerns.       Review of Systems   Constitutional:  Negative for chills and fever.   HENT:  Negative for congestion and sore throat.    Eyes:  Negative for pain and visual disturbance.   Respiratory:  Negative for cough, chest tightness and shortness of breath.    Cardiovascular:  Negative for chest pain and palpitations.   Gastrointestinal:  Negative for abdominal pain, nausea and vomiting.   Genitourinary:  Negative for decreased urine volume and dysuria.   Skin:  Negative for rash.   Neurological:  Negative for dizziness, weakness and headaches.   Psychiatric/Behavioral:  Negative for dysphoric mood. The patient is not nervous/anxious.        Physical Exam  Constitutional:       General: She is not in acute distress.     Appearance: Normal appearance. She is normal weight. She is not ill-appearing.      Comments: Patient is ambulating with a walker.   HENT:

## 2024-02-02 ENCOUNTER — HOSPITAL ENCOUNTER (OUTPATIENT)
Dept: PHYSICAL THERAPY | Age: 69
Setting detail: THERAPIES SERIES
Discharge: HOME | End: 2024-02-02
Payer: MEDICARE

## 2024-02-02 PROCEDURE — 97162 PT EVAL MOD COMPLEX 30 MIN: CPT

## 2024-02-02 PROCEDURE — 97110 THERAPEUTIC EXERCISES: CPT

## 2024-02-02 NOTE — PROGRESS NOTES
** PLEASE SIGN, DATE AND TIME CERTIFICATION BELOW AND RETURN TO [facility] OUTPATIENT REHABILITATION (FAX #: [phone]).  ATTEST/CO-SIGN IF ACCESSING VIA INDataSphereET.  THANK YOU.**    I certify that I have examined the patient below and determined that Physical Medicine and Rehabilitation service is necessary and that I approve the established plan of care for up to 90 days or as specifically noted.  Attestation, signature or co-signature of physician indicates approval of certification requirements.    ________________________ ____________ __________  Physician Signature   Date   Time   [facility]  PHYSICAL THERAPY  [x] EVALUATION  [] DAILY NOTE (LAND) [] DAILY NOTE (AQUATIC ) [] PROGRESS NOTE [] DISCHARGE NOTE    [] OUTPATIENT REHABILITATION CENTER [facility]   [x] [place] [facility]    [] [facility]   [] [facility]    Date: 2024  Patient Name:  Dominique Pink  : 1955  MRN: [id number]  CSN: [id number]    Referring Practitioner William Rowe PA    Diagnosis Unilateral primary osteoarthritis, right hip    Treatment Diagnosis M25.551  Right Hip Pain  M79.651  Pain in right thigh  M25,651 Stiffness of right hip, not elsewhere classified  R53.1 Weakness  M62.81 Generalized Muscle Weakness  R26.2 Difficulty in walking   Date of Evaluation 24    Additional Pertinent History Vertigo, anxiety, arthritis, fibromyalgia, L ORIF femur 2018      Functional Outcome Measure Used [facility]   Functional Outcome Score Eval 22 (24)       Insurance: Primary: Payor: [employer] /  /  / ,   Secondary:    Authorization Information:  DEDUCTIBLE: $NA                 OUT OF POCKET: $5300 MET $114.24              INSURANCE PAYS AT: 100% AFTER COPAY                PATIENT RESPONSIBILITY AND/OR CO-PAY: $40 COPAY PER VISIT  SECONDARY INSURANCE COMPANY:  NA      PRE CERTIFICATION REQUIRED: NO  INSURANCE THERAPY BENEFIT:  NO LIMIT BASED ON MED NECESSITY  AQUATIC THERAPY

## 2024-02-06 ENCOUNTER — HOSPITAL ENCOUNTER (OUTPATIENT)
Dept: PHYSICAL THERAPY | Age: 69
Setting detail: THERAPIES SERIES
Discharge: HOME | End: 2024-02-06
Payer: MEDICARE

## 2024-02-06 PROCEDURE — 97110 THERAPEUTIC EXERCISES: CPT

## 2024-02-06 NOTE — PROGRESS NOTES
[facility]  PHYSICAL THERAPY  [] EVALUATION  [x] DAILY NOTE (LAND) [] DAILY NOTE (AQUATIC ) [] PROGRESS NOTE [] DISCHARGE NOTE    [] OUTPATIENT REHABILITATION CENTER - [place]   [x] [place] [facility]    [] [facility]   [] [facility]    Date: 2024  Patient Name:  Dominique Pink  : 1955  MRN: [id number]  CSN: [id number]    Referring Practitioner William Rowe PA    Diagnosis Unilateral primary osteoarthritis, right hip    Treatment Diagnosis M25.551  Right Hip Pain  M79.651  Pain in right thigh  M25,651 Stiffness of right hip, not elsewhere classified  R53.1 Weakness  M62.81 Generalized Muscle Weakness  R26.2 Difficulty in walking   Date of Evaluation 24    Additional Pertinent History Vertigo, anxiety, arthritis, fibromyalgia, L ORIF femur 2018      Functional Outcome Measure Used [facility]    Functional Outcome Score Eval 22 (24)       Insurance: Primary: Payor: [employer] /  /  / ,   Secondary:    Authorization Information:  DEDUCTIBLE: $NA                 OUT OF POCKET: $5300 MET $114.24              INSURANCE PAYS AT: 100% AFTER COPAY                PATIENT RESPONSIBILITY AND/OR CO-PAY: $40 COPAY PER VISIT  SECONDARY INSURANCE COMPANY:  [employer]      PRE CERTIFICATION REQUIRED: NO  INSURANCE THERAPY BENEFIT:  NO LIMIT BASED ON MED NECESSITY  AQUATIC THERAPY COVERED: YES  MODALITIES COVERED:  YES   Approved Procedure Codes: 17332 - Therapeutic Exercise   and 51504 - Gait Training  (Codes requested indicated by red font, codes approved indicated by black font)   Visit # 2, 2/10 for progress note   Visits Allowed: unlimited   Recertification Date: 24   Physician Follow-Up: 3/7/24 with Dr. Ivy   Physician Orders:    History of Present Illness: Patient has had R hip pain x 2 years.  2 injections last year but pain continued.  Patient had R THR on 24.  Home the next day, has had issues with taking aspirin after surgery due to

## 2024-02-07 ENCOUNTER — HOSPITAL ENCOUNTER (OUTPATIENT)
Dept: PHYSICAL THERAPY | Age: 69
Setting detail: THERAPIES SERIES
Discharge: HOME | End: 2024-02-07
Payer: MEDICARE

## 2024-02-07 PROCEDURE — 97110 THERAPEUTIC EXERCISES: CPT

## 2024-02-07 NOTE — PROGRESS NOTES
[facility]  PHYSICAL THERAPY  [] EVALUATION  [x] DAILY NOTE (LAND) [] DAILY NOTE (AQUATIC ) [] PROGRESS NOTE [] DISCHARGE NOTE    [] OUTPATIENT REHABILITATION CENTER - [place]   [x] [place] [facility]    [] [facility]   [] [facility]    Date: 2024  Patient Name:  Dominique Pink  : 1955  MRN: [id number]  CSN: [id number]    Referring Practitioner William Rowe PA    Diagnosis Unilateral primary osteoarthritis, right hip    Treatment Diagnosis M25.551  Right Hip Pain  M79.651  Pain in right thigh  M25,651 Stiffness of right hip, not elsewhere classified  R53.1 Weakness  M62.81 Generalized Muscle Weakness  R26.2 Difficulty in walking   Date of Evaluation 24    Additional Pertinent History Vertigo, anxiety, arthritis, fibromyalgia, L ORIF femur 2018      Functional Outcome Measure Used [facility]    Functional Outcome Score Eval 22 (24)       Insurance: Primary: Payor: [employer] /  /  / ,   Secondary:    Authorization Information:  DEDUCTIBLE: $NA                 OUT OF POCKET: $5300 MET $114.24              INSURANCE PAYS AT: 100% AFTER COPAY                PATIENT RESPONSIBILITY AND/OR CO-PAY: $40 COPAY PER VISIT  SECONDARY INSURANCE COMPANY:  [employer]E      PRE CERTIFICATION REQUIRED: NO  INSURANCE THERAPY BENEFIT:  NO LIMIT BASED ON MED NECESSITY  AQUATIC THERAPY COVERED: YES  MODALITIES COVERED:  YES   Approved Procedure Codes: 23645 - Therapeutic Exercise   and 00568 - Gait Training  (Codes requested indicated by red font, codes approved indicated by black font)   Visit # 3, 3/10 for progress note   Visits Allowed: unlimited   Recertification Date: 24   Physician Follow-Up: 3/7/24 with Dr. Ivy   Physician Orders:    History of Present Illness: Patient has had R hip pain x 2 years.  2 injections last year but pain continued.  Patient had R THR on 24.  Home the next day, has had issues with taking aspirin after surgery due to

## 2024-02-09 ENCOUNTER — HOSPITAL ENCOUNTER (OUTPATIENT)
Dept: PHYSICAL THERAPY | Age: 69
Setting detail: THERAPIES SERIES
Discharge: HOME | End: 2024-02-09
Payer: MEDICARE

## 2024-02-09 PROCEDURE — 97110 THERAPEUTIC EXERCISES: CPT

## 2024-02-09 NOTE — PROGRESS NOTES
[facility]  PHYSICAL THERAPY  [] EVALUATION  [x] DAILY NOTE (LAND) [] DAILY NOTE (AQUATIC ) [] PROGRESS NOTE [] DISCHARGE NOTE    [] OUTPATIENT REHABILITATION CENTER - [place]   [x] [place] [facility]    [] [facility]   [] [facility]    Date: 2024  Patient Name:  Dominique Pink  : 1955  MRN: [id number]  CSN: [id number]    Referring Practitioner William Rowe PA    Diagnosis Unilateral primary osteoarthritis, right hip    Treatment Diagnosis M25.551  Right Hip Pain  M79.651  Pain in right thigh  M25,651 Stiffness of right hip, not elsewhere classified  R53.1 Weakness  M62.81 Generalized Muscle Weakness  R26.2 Difficulty in walking   Date of Evaluation 24    Additional Pertinent History Vertigo, anxiety, arthritis, fibromyalgia, L ORIF femur 2018      Functional Outcome Measure Used [facility]    Functional Outcome Score Eval 22 (24)       Insurance: Primary: Payor: [employer] /  /  / ,   Secondary:    Authorization Information:  DEDUCTIBLE: $NA                 OUT OF POCKET: $5300 MET $114.24              INSURANCE PAYS AT: 100% AFTER COPAY                PATIENT RESPONSIBILITY AND/OR CO-PAY: $40 COPAY PER VISIT  SECONDARY INSURANCE COMPANY:  [employer]E      PRE CERTIFICATION REQUIRED: NO  INSURANCE THERAPY BENEFIT:  NO LIMIT BASED ON MED NECESSITY  AQUATIC THERAPY COVERED: YES  MODALITIES COVERED:  YES   Approved Procedure Codes: 56393 - Therapeutic Exercise   and 18448 - Gait Training  (Codes requested indicated by red font, codes approved indicated by black font)   Visit # 4,4/10 for progress note   Visits Allowed: unlimited   Recertification Date: 24   Physician Follow-Up: 3/7/24 with Dr. Ivy   Physician Orders:    History of Present Illness: Patient has had R hip pain x 2 years.  2 injections last year but pain continued.  Patient had R THR on 24.  Home the next day, has had issues with taking aspirin after surgery due to

## 2024-02-12 ENCOUNTER — HOSPITAL ENCOUNTER (OUTPATIENT)
Dept: PHYSICAL THERAPY | Age: 69
Setting detail: THERAPIES SERIES
Discharge: HOME | End: 2024-02-12
Payer: MEDICARE

## 2024-02-12 PROCEDURE — 97110 THERAPEUTIC EXERCISES: CPT

## 2024-02-12 NOTE — PROGRESS NOTES
[facility]  PHYSICAL THERAPY  [] EVALUATION  [x] DAILY NOTE (LAND) [] DAILY NOTE (AQUATIC ) [] PROGRESS NOTE [] DISCHARGE NOTE    [] [facility] [place]   [x] [place] [facility]    [] [facility]   [] [facility]    Date: 2024  Patient Name:  Dominique Pink  : 1955  MRN: [id number]  CSN: [id number]    Referring Practitioner William Rowe PA    Diagnosis Unilateral primary osteoarthritis, right hip    Treatment Diagnosis M25.551  Right Hip Pain  M79.651  Pain in right thigh  M25,651 Stiffness of right hip, not elsewhere classified  R53.1 Weakness  M62.81 Generalized Muscle Weakness  R26.2 Difficulty in walking   Date of Evaluation 24    Additional Pertinent History Vertigo, anxiety, arthritis, fibromyalgia, L ORIF femur 2018      Functional Outcome Measure Used HOOS    Functional Outcome Score Eval 22 (24)       Insurance: Primary: Payor: [employer] /  /  / ,   Secondary:    Authorization Information:  DEDUCTIBLE: $NA                 OUT OF POCKET: $5300 MET $114.24              INSURANCE PAYS AT: 100% AFTER COPAY                PATIENT RESPONSIBILITY AND/OR CO-PAY: $40 COPAY PER VISIT  SECONDARY INSURANCE COMPANY:  [employer]      PRE CERTIFICATION REQUIRED: NO  INSURANCE THERAPY BENEFIT:  NO LIMIT BASED ON MED NECESSITY  AQUATIC THERAPY COVERED: YES  MODALITIES COVERED:  YES   Approved Procedure Codes: 59832 - Therapeutic Exercise   and 84772 - Gait Training  (Codes requested indicated by red font, codes approved indicated by black font)   Visit # 5, 5/10 for progress note   Visits Allowed: unlimited   Recertification Date: 24   Physician Follow-Up: 3/7/24 with Dr. Ivy   Physician Orders:    History of Present Illness: Patient has had R hip pain x 2 years.  2 injections last year but pain continued.  Patient had R THR on 24.  Home the next day, has had issues with taking aspirin after surgery due to

## 2024-02-14 ENCOUNTER — HOSPITAL ENCOUNTER (OUTPATIENT)
Dept: PHYSICAL THERAPY | Age: 69
Setting detail: THERAPIES SERIES
Discharge: HOME | End: 2024-02-14
Payer: MEDICARE

## 2024-02-14 PROCEDURE — 97110 THERAPEUTIC EXERCISES: CPT

## 2024-02-14 NOTE — PROGRESS NOTES
[facility]  PHYSICAL THERAPY  [] EVALUATION  [x] DAILY NOTE (LAND) [] DAILY NOTE (AQUATIC ) [] PROGRESS NOTE [] DISCHARGE NOTE    [] [facility] [place]   [x] [place] [facility]    [] [facility][1]   [] [facility]    Date: 2024  Patient Name:  Dominique Pink  : 1955  MRN: [id number]  CSN: [id number]    Referring Practitioner William Rowe PA    Diagnosis Unilateral primary osteoarthritis, right hip    Treatment Diagnosis M25.551  Right Hip Pain  M79.651  Pain in right thigh  M25,651 Stiffness of right hip, not elsewhere classified  R53.1 Weakness  M62.81 Generalized Muscle Weakness  R26.2 Difficulty in walking   Date of Evaluation 24    Additional Pertinent History Vertigo, anxiety, arthritis, fibromyalgia, L ORIF femur 2018      Functional Outcome Measure Used HOOS    Functional Outcome Score Eval 22 (24)       Insurance: Primary: Payor: [employer] /  /  / ,   Secondary:    Authorization Information:  DEDUCTIBLE: $NA                 OUT OF POCKET: $5300 MET $114.24              INSURANCE PAYS AT: 100% AFTER COPAY                PATIENT RESPONSIBILITY AND/OR CO-PAY: $40 COPAY PER VISIT  SECONDARY INSURANCE COMPANY:  [employer]      PRE CERTIFICATION REQUIRED: NO  INSURANCE THERAPY BENEFIT:  NO LIMIT BASED ON MED NECESSITY  AQUATIC THERAPY COVERED: YES  MODALITIES COVERED:  YES   Approved Procedure Codes: 68495 - Therapeutic Exercise   and 20098 - Gait Training  (Codes requested indicated by red font, codes approved indicated by black font)   Visit # 6, 6/10 for progress note   Visits Allowed: unlimited   Recertification Date: 24   Physician Follow-Up: 3/7/24 with Dr. Ivy   Physician Orders:    History of Present Illness: Patient has had R hip pain x 2 years.  2 injections last year but pain continued.  Patient had R THR on 24.  Home the next day, has had issues with taking aspirin after surgery due to

## 2024-02-16 ENCOUNTER — HOSPITAL ENCOUNTER (OUTPATIENT)
Dept: PHYSICAL THERAPY | Age: 69
Setting detail: THERAPIES SERIES
Discharge: HOME | End: 2024-02-16
Payer: MEDICARE

## 2024-02-16 PROCEDURE — 97110 THERAPEUTIC EXERCISES: CPT

## 2024-02-16 NOTE — PROGRESS NOTES
[facility]  PHYSICAL THERAPY  [] EVALUATION  [x] DAILY NOTE (LAND) [] DAILY NOTE (AQUATIC ) [] PROGRESS NOTE [] DISCHARGE NOTE    [] [facility] [place]   [x] [place] [facility]    [] [facility]   [] [facility]    Date: 2024  Patient Name:  Dominique Pink  : 1955  MRN: [id number]  CSN: [id number]    Referring Practitioner William Rowe PA    Diagnosis Unilateral primary osteoarthritis, right hip    Treatment Diagnosis M25.551  Right Hip Pain  M79.651  Pain in right thigh  M25,651 Stiffness of right hip, not elsewhere classified  R53.1 Weakness  M62.81 Generalized Muscle Weakness  R26.2 Difficulty in walking   Date of Evaluation 24    Additional Pertinent History Vertigo, anxiety, arthritis, fibromyalgia, L ORIF femur 2018      Functional Outcome Measure Used HOOS    Functional Outcome Score Eval 22 (24)       Insurance: Primary: Payor: [employer] /  /  / ,   Secondary:    Authorization Information:  DEDUCTIBLE: $NA                 OUT OF POCKET: $5300 MET $114.24              INSURANCE PAYS AT: 100% AFTER COPAY                PATIENT RESPONSIBILITY AND/OR CO-PAY: $40 COPAY PER VISIT  SECONDARY INSURANCE COMPANY:  [employer]      PRE CERTIFICATION REQUIRED: NO  INSURANCE THERAPY BENEFIT:  NO LIMIT BASED ON MED NECESSITY  AQUATIC THERAPY COVERED: YES  MODALITIES COVERED:  YES   Approved Procedure Codes: 25572 - Therapeutic Exercise   and 97757 - Gait Training  (Codes requested indicated by red font, codes approved indicated by black font)   Visit # 7, 7/10 for progress note   Visits Allowed: unlimited   Recertification Date: 24   Physician Follow-Up: 3/7/24 with Dr. Ivy   Physician Orders:    History of Present Illness: Patient has had R hip pain x 2 years.  2 injections last year but pain continued.  Patient had R THR on 24.  Home the next day, has had issues with taking aspirin after surgery due to

## 2024-02-19 ENCOUNTER — HOSPITAL ENCOUNTER (OUTPATIENT)
Dept: PHYSICAL THERAPY | Age: 69
Setting detail: THERAPIES SERIES
Discharge: HOME | End: 2024-02-19
Payer: MEDICARE

## 2024-02-19 PROCEDURE — 97110 THERAPEUTIC EXERCISES: CPT

## 2024-02-19 NOTE — PROGRESS NOTES
[facility]  PHYSICAL THERAPY  [] EVALUATION  [x] DAILY NOTE (LAND) [] DAILY NOTE (AQUATIC ) [] PROGRESS NOTE [] DISCHARGE NOTE    [] [facility] [place]   [x] [place] [facility]    [] [facility]   [] [facility]    Date: 2024  Patient Name:  Dominique Pink  : 1955  MRN: [id number]  CSN: [id number]    Referring Practitioner William Rowe PA    Diagnosis Unilateral primary osteoarthritis, right hip    Treatment Diagnosis M25.551  Right Hip Pain  M79.651  Pain in right thigh  M25,651 Stiffness of right hip, not elsewhere classified  R53.1 Weakness  M62.81 Generalized Muscle Weakness  R26.2 Difficulty in walking   Date of Evaluation 24    Additional Pertinent History Vertigo, anxiety, arthritis, fibromyalgia, L ORIF femur 2018      Functional Outcome Measure Used HOOS    Functional Outcome Score Eval 22 (24)       Insurance: Primary: Payor: [employer] /  /  / ,   Secondary:    Authorization Information:  DEDUCTIBLE: $NA                 OUT OF POCKET: $5300 MET $114.24              INSURANCE PAYS AT: 100% AFTER COPAY                PATIENT RESPONSIBILITY AND/OR CO-PAY: $40 COPAY PER VISIT  SECONDARY INSURANCE COMPANY:  NA      PRE CERTIFICATION REQUIRED: NO  INSURANCE THERAPY BENEFIT:  NO LIMIT BASED ON MED NECESSITY  AQUATIC THERAPY COVERED: YES  MODALITIES COVERED:  YES   Approved Procedure Codes: 35274 - Therapeutic Exercise   and 83619 - Gait Training  (Codes requested indicated by red font, codes approved indicated by black font)   Visit # 8, 8/10 for progress note   Visits Allowed: unlimited   Recertification Date: 24   Physician Follow-Up: 3/7/24 with Dr. Ivy   Physician Orders:    History of Present Illness: Patient has had R hip pain x 2 years.  2 injections last year but pain continued.  Patient had R THR on 24.  Home the next day, has had issues with taking aspirin after surgery due to

## 2024-02-21 ENCOUNTER — HOSPITAL ENCOUNTER (OUTPATIENT)
Dept: PHYSICAL THERAPY | Age: 69
Setting detail: THERAPIES SERIES
Discharge: HOME | End: 2024-02-21
Payer: MEDICARE

## 2024-02-21 PROCEDURE — 97110 THERAPEUTIC EXERCISES: CPT

## 2024-02-21 NOTE — PROGRESS NOTES
[facility]  PHYSICAL THERAPY  [] EVALUATION  [x] DAILY NOTE (LAND) [] DAILY NOTE (AQUATIC ) [] PROGRESS NOTE [] DISCHARGE NOTE    [] [facility] [place]   [x] [place] [facility]    [] [facility]   [] [facility]    Date: 2024  Patient Name:  Dominique Pink  : 1955  MRN: [id number]  CSN: [id number]    Referring Practitioner William Rowe PA    Diagnosis Unilateral primary osteoarthritis, right hip    Treatment Diagnosis M25.551  Right Hip Pain  M79.651  Pain in right thigh  M25,651 Stiffness of right hip, not elsewhere classified  R53.1 Weakness  M62.81 Generalized Muscle Weakness  R26.2 Difficulty in walking   Date of Evaluation 24    Additional Pertinent History Vertigo, anxiety, arthritis, fibromyalgia, L ORIF femur 2018      Functional Outcome Measure Used HOOS    Functional Outcome Score Eval 22 (24)       Insurance: Primary: Payor: [employer] /  /  / ,   Secondary:    Authorization Information:  DEDUCTIBLE: $NA                 OUT OF POCKET: $5300 MET $114.24              INSURANCE PAYS AT: 100% AFTER COPAY                PATIENT RESPONSIBILITY AND/OR CO-PAY: $40 COPAY PER VISIT  SECONDARY INSURANCE COMPANY:  [employer]      PRE CERTIFICATION REQUIRED: NO  INSURANCE THERAPY BENEFIT:  NO LIMIT BASED ON MED NECESSITY  AQUATIC THERAPY COVERED: YES  MODALITIES COVERED:  YES   Approved Procedure Codes: 29468 - Therapeutic Exercise   and 86548 - Gait Training  (Codes requested indicated by red font, codes approved indicated by black font)   Visit # 9, 9/10 for progress note   Visits Allowed: unlimited   Recertification Date: 24   Physician Follow-Up: 3/7/24 with Dr. Ivy   Physician Orders:    History of Present Illness: Patient has had R hip pain x 2 years.  2 injections last year but pain continued.  Patient had R THR on 24.  Home the next day, has had issues with taking aspirin after surgery due to

## 2024-02-23 ENCOUNTER — HOSPITAL ENCOUNTER (OUTPATIENT)
Dept: PHYSICAL THERAPY | Age: 69
Setting detail: THERAPIES SERIES
Discharge: HOME | End: 2024-02-23
Payer: MEDICARE

## 2024-02-23 PROCEDURE — 97110 THERAPEUTIC EXERCISES: CPT

## 2024-02-23 ASSESSMENT — HOOS JR
HOOS JR TOTAL INTERVAL SCORE: 76.776
LYING IN BED (TURNING OVER, MAINTAINING HIP POSITION): 2
RISING FROM SITTING: 0
GOING UP OR DOWN STAIRS: 1
SITTING: 0
WALKING ON UNEVEN SURFACE: 1
BENDING TO THE FLOOR TO PICK UP OBJECT: 0
HOOS JR RAW SCORE: 4
HOOS JR RAW SCORE: 4

## 2024-02-23 NOTE — PROGRESS NOTES
[facility]  PHYSICAL THERAPY  [] EVALUATION  [] DAILY NOTE (LAND) [] DAILY NOTE (AQUATIC ) [x] PROGRESS NOTE [] DISCHARGE NOTE    [] [facility] [place]   [x] [place] [facility]    [] [facility]   [] [facility]    Date: 2024  Patient Name:  Dominique Pink  : 1955  MRN: [id number]  CSN: [id number]    Referring Practitioner William Rowe PA    Diagnosis Unilateral primary osteoarthritis, right hip    Treatment Diagnosis M25.551  Right Hip Pain  M79.651  Pain in right thigh  M25,651 Stiffness of right hip, not elsewhere classified  R53.1 Weakness  M62.81 Generalized Muscle Weakness  R26.2 Difficulty in walking   Date of Evaluation 24    Additional Pertinent History Vertigo, anxiety, arthritis, fibromyalgia, L ORIF femur 2018      Functional Outcome Measure Used HOOS    Functional Outcome Score Eval 22 (24) 4 (24)      Insurance: Primary: Payor: [employer]IC[employer] /  /  / ,   Secondary:    Authorization Information:  DEDUCTIBLE: $NA                 OUT OF POCKET: $5300 MET $114.24              INSURANCE PAYS AT: 100% AFTER COPAY                PATIENT RESPONSIBILITY AND/OR CO-PAY: $40 COPAY PER VISIT  SECONDARY INSURANCE COMPANY:  NA      PRE CERTIFICATION REQUIRED: NO  INSURANCE THERAPY BENEFIT:  NO LIMIT BASED ON MED NECESSITY  AQUATIC THERAPY COVERED: YES  MODALITIES COVERED:  YES   Approved Procedure Codes: 37022 - Therapeutic Exercise   and 41714 - Gait Training  (Codes requested indicated by red font, codes approved indicated by black font)   Visit # 10, 10/10 for progress note   Visits Allowed: unlimited   Recertification Date: 24   Physician Follow-Up: 3/7/24 with Dr. Ivy   Physician Orders:    History of Present Illness: Patient has had R hip pain x 2 years.  2 injections last year but pain continued.  Patient had R THR on 24.  Home the next day, has had issues with taking aspirin after

## 2024-02-26 ENCOUNTER — HOSPITAL ENCOUNTER (OUTPATIENT)
Dept: PHYSICAL THERAPY | Age: 69
Setting detail: THERAPIES SERIES
Discharge: HOME | End: 2024-02-26
Payer: MEDICARE

## 2024-02-26 PROCEDURE — 97110 THERAPEUTIC EXERCISES: CPT

## 2024-02-26 NOTE — PROGRESS NOTES
session.    GOALS:  Patient Goal: to walk normal and lift leg    Short Term Goals:  Time Frame: 4 weeks  Increase AROM R hip flexion 20, knee flexion 115 degrees to allow patient to get leg in/out of bed without using hands GOAL MET:  hip flexion 121 degrees, knee flexion 138 degrees, independent with bed mobility.  Discontinue Goal  Increase strength R LE to allow 10 reps SLR without assist to get leg in/out of car without arms GOAL MET:  able to do 10 reps SLR and no issues getting in/out of car.  Discontinue Goal  I with HEP as prescribed to allow patient to report able to progress to walking with cane in house with no buckling in R LE GOAL MET:  Pt independent with current HEP and able to walk with cane in the house without RLE buckling.  Continue Goal    Long Term Goals:  Time Frame: 12 weeks  Increase AROM R hip flexion to 70, abduction to 10, knee flexion to 120 degrees to allow patient to return to driving with no increased pain GOAL MET:  abduction 21 degrees, not driving per doctor restriction of 6 weeks.  Discontinue Goal  Increase strength GOAL NOT MET- right hip flexion 4/5, abduction 3+/5. Continue Goal    Patient Education:   [x]  HEP/Education Completed: no AD in the home, cane in community as tolerated  access Code: [email or web address]  - Supine Bridge  - 2 x daily - 7 x weekly - 1 sets - 10 reps - 5s hold  - Abdominal bracing with Supine hip adduction  - 2 x daily - 7 x weekly - 1 sets - 10 reps - 5s hold  - Standing Heel Raise with Support  - 2 x daily - 7 x weekly - 1 sets - 10 reps  - Standing Toe Raises at Chair  - 2 x daily - 7 x weekly - 1 sets - 10 reps  - Standing March with Counter Support  - 2 x daily - 7 x weekly - 1 sets - 10 reps  - Mini Squat with Counter Support  - 2 x daily - 7 x weekly - 1 sets - 10 reps  - Standing Knee Flexion AROM with Chair Support  - 2 x daily - 7 x weekly - 1 sets - 10 reps  - Standing Hip Extension with Counter Support  - 2 x daily - 7 x weekly - 1 sets - 10 reps  -

## 2024-02-28 ENCOUNTER — HOSPITAL ENCOUNTER (OUTPATIENT)
Dept: PHYSICAL THERAPY | Age: 69
Setting detail: THERAPIES SERIES
End: 2024-02-28
Payer: MEDICARE

## 2024-03-01 ENCOUNTER — HOSPITAL ENCOUNTER (OUTPATIENT)
Dept: PHYSICAL THERAPY | Age: 69
Setting detail: THERAPIES SERIES
Discharge: HOME | End: 2024-03-01
Payer: MEDICARE

## 2024-03-01 PROCEDURE — 97110 THERAPEUTIC EXERCISES: CPT

## 2024-03-01 NOTE — DISCHARGE SUMMARY
Strengthening, Range of Motion, and Balance Training, gait training    []  Plan of care initiated.  Plan to see patient 3 times per week for 12 weeks to address the treatment planned outlined above.  []  Continue with current plan of care  []  Modify plan of care as follows:    []  Hold pending physician visit  [x]  Discharge    Time In 1255   Time Out 1335   Timed Code Minutes:   40 min   Total Treatment Time: 40 min       Electronically Signed by: Nataliya Barbour, PT [id number]

## 2024-03-22 DIAGNOSIS — F41.1 GAD (GENERALIZED ANXIETY DISORDER): Chronic | ICD-10-CM

## 2024-03-22 DIAGNOSIS — M79.7 FIBROMYALGIA: Chronic | ICD-10-CM

## 2024-03-22 DIAGNOSIS — F51.01 PRIMARY INSOMNIA: ICD-10-CM

## 2024-03-25 RX ORDER — DIAZEPAM 5 MG/1
5 TABLET ORAL NIGHTLY PRN
Qty: 30 TABLET | Refills: 0 | Status: SHIPPED | OUTPATIENT
Start: 2024-03-25 | End: 2024-04-24

## 2024-03-25 NOTE — TELEPHONE ENCOUNTER
Dominique Pink called requesting a refill on the following medications:  Requested Prescriptions     Pending Prescriptions Disp Refills    diazePAM (VALIUM) 5 MG tablet 30 tablet 3     Sig: Take 1 tablet by mouth nightly as needed for Anxiety for up to 30 days.       Date of last visit: 1/31/2024  Date of next visit (if applicable):5/9/2024  Date of last refill: 9/7/23  Pharmacy Name: [facility],  Vijaya Molina MA

## 2024-05-01 ENCOUNTER — TELEPHONE (OUTPATIENT)
Dept: FAMILY MEDICINE CLINIC | Age: 69
End: 2024-05-01

## 2024-05-01 DIAGNOSIS — R11.0 NAUSEA: Primary | ICD-10-CM

## 2024-05-01 DIAGNOSIS — R42 VERTIGO: ICD-10-CM

## 2024-05-01 RX ORDER — ONDANSETRON 4 MG/1
4 TABLET, ORALLY DISINTEGRATING ORAL 3 TIMES DAILY PRN
Qty: 21 TABLET | Refills: 0 | Status: SHIPPED | OUTPATIENT
Start: 2024-05-01

## 2024-05-01 RX ORDER — MECLIZINE HCL 12.5 MG/1
12.5 TABLET ORAL 3 TIMES DAILY PRN
Qty: 15 TABLET | Refills: 0 | Status: SHIPPED | OUTPATIENT
Start: 2024-05-01 | End: 2024-05-11

## 2024-05-01 NOTE — TELEPHONE ENCOUNTER
Patient c/o vertigo with severe nausea  States she stood up too quickly   States she has experienced this many times in the past   Is doing exercises that usually help with the dizziness  Drinking garcia tea for nausea  Had one phenergan tablet and took that but it made her very sleepy  Asking if there is something else she can take?

## 2024-05-02 NOTE — TELEPHONE ENCOUNTER
Patient states she has not been able to get the medication but someone is delivering it at 9am  Instructed to put zofran under her tongue to dissolve- she verbalized understanding  Instructed to call if symptoms continue or worsen  She verbalized understanding

## 2024-05-06 SDOH — HEALTH STABILITY: PHYSICAL HEALTH: ON AVERAGE, HOW MANY DAYS PER WEEK DO YOU ENGAGE IN MODERATE TO STRENUOUS EXERCISE (LIKE A BRISK WALK)?: 4 DAYS

## 2024-05-06 SDOH — ECONOMIC STABILITY: FOOD INSECURITY: WITHIN THE PAST 12 MONTHS, THE FOOD YOU BOUGHT JUST DIDN'T LAST AND YOU DIDN'T HAVE MONEY TO GET MORE.: NEVER TRUE

## 2024-05-06 SDOH — ECONOMIC STABILITY: INCOME INSECURITY: HOW HARD IS IT FOR YOU TO PAY FOR THE VERY BASICS LIKE FOOD, HOUSING, MEDICAL CARE, AND HEATING?: NOT HARD AT ALL

## 2024-05-06 SDOH — ECONOMIC STABILITY: FOOD INSECURITY: WITHIN THE PAST 12 MONTHS, YOU WORRIED THAT YOUR FOOD WOULD RUN OUT BEFORE YOU GOT MONEY TO BUY MORE.: NEVER TRUE

## 2024-05-06 SDOH — HEALTH STABILITY: PHYSICAL HEALTH: ON AVERAGE, HOW MANY MINUTES DO YOU ENGAGE IN EXERCISE AT THIS LEVEL?: 10 MIN

## 2024-05-06 ASSESSMENT — PATIENT HEALTH QUESTIONNAIRE - PHQ9
1. LITTLE INTEREST OR PLEASURE IN DOING THINGS: NOT AT ALL
SUM OF ALL RESPONSES TO PHQ QUESTIONS 1-9: 1
SUM OF ALL RESPONSES TO PHQ9 QUESTIONS 1 & 2: 1
2. FEELING DOWN, DEPRESSED OR HOPELESS: SEVERAL DAYS
SUM OF ALL RESPONSES TO PHQ QUESTIONS 1-9: 1

## 2024-05-06 ASSESSMENT — LIFESTYLE VARIABLES
HOW MANY STANDARD DRINKS CONTAINING ALCOHOL DO YOU HAVE ON A TYPICAL DAY: 1 OR 2
HOW OFTEN DO YOU HAVE A DRINK CONTAINING ALCOHOL: 4
HOW OFTEN DO YOU HAVE SIX OR MORE DRINKS ON ONE OCCASION: 1
HOW MANY STANDARD DRINKS CONTAINING ALCOHOL DO YOU HAVE ON A TYPICAL DAY: 1
HOW OFTEN DO YOU HAVE A DRINK CONTAINING ALCOHOL: 2-3 TIMES A WEEK

## 2024-05-09 ENCOUNTER — OFFICE VISIT (OUTPATIENT)
Dept: FAMILY MEDICINE CLINIC | Age: 69
End: 2024-05-09

## 2024-05-09 VITALS
RESPIRATION RATE: 18 BRPM | OXYGEN SATURATION: 98 % | BODY MASS INDEX: 20.65 KG/M2 | HEIGHT: 68 IN | DIASTOLIC BLOOD PRESSURE: 78 MMHG | SYSTOLIC BLOOD PRESSURE: 128 MMHG | TEMPERATURE: 97.6 F | HEART RATE: 62 BPM | WEIGHT: 136.25 LBS

## 2024-05-09 DIAGNOSIS — F41.1 GAD (GENERALIZED ANXIETY DISORDER): Chronic | ICD-10-CM

## 2024-05-09 DIAGNOSIS — K66.0 ABDOMINAL ADHESIONS: Chronic | ICD-10-CM

## 2024-05-09 DIAGNOSIS — M79.7 FIBROMYALGIA: Chronic | ICD-10-CM

## 2024-05-09 DIAGNOSIS — R20.0 LEFT ARM NUMBNESS: ICD-10-CM

## 2024-05-09 DIAGNOSIS — R20.0 LEFT FACIAL NUMBNESS: ICD-10-CM

## 2024-05-09 DIAGNOSIS — R42 VERTIGO: ICD-10-CM

## 2024-05-09 DIAGNOSIS — F51.01 PRIMARY INSOMNIA: ICD-10-CM

## 2024-05-09 DIAGNOSIS — K58.8 OTHER IRRITABLE BOWEL SYNDROME: ICD-10-CM

## 2024-05-09 DIAGNOSIS — R10.9 RECURRENT ABDOMINAL PAIN: ICD-10-CM

## 2024-05-09 DIAGNOSIS — Z00.00 MEDICARE ANNUAL WELLNESS VISIT, SUBSEQUENT: Primary | ICD-10-CM

## 2024-05-09 PROBLEM — M79.604 PAIN IN RIGHT LEG: Status: RESOLVED | Noted: 2023-07-28 | Resolved: 2024-05-09

## 2024-05-09 PROBLEM — Z72.0 TOBACCO USER: Status: RESOLVED | Noted: 2021-08-31 | Resolved: 2024-05-09

## 2024-05-09 RX ORDER — DICYCLOMINE HYDROCHLORIDE 10 MG/1
10 CAPSULE ORAL 3 TIMES DAILY PRN
Qty: 120 CAPSULE | Refills: 1 | Status: SHIPPED | OUTPATIENT
Start: 2024-05-09

## 2024-05-09 RX ORDER — DIAZEPAM 5 MG/1
5 TABLET ORAL NIGHTLY PRN
Qty: 30 TABLET | Refills: 2 | Status: SHIPPED | OUTPATIENT
Start: 2024-05-09 | End: 2024-08-07

## 2024-05-09 RX ORDER — BACLOFEN 20 MG/1
20 TABLET ORAL 3 TIMES DAILY PRN
Qty: 90 TABLET | Refills: 2 | Status: SHIPPED | OUTPATIENT
Start: 2024-05-09

## 2024-05-09 ASSESSMENT — LIFESTYLE VARIABLES
HOW MANY STANDARD DRINKS CONTAINING ALCOHOL DO YOU HAVE ON A TYPICAL DAY: 1 OR 2
HOW OFTEN DO YOU HAVE A DRINK CONTAINING ALCOHOL: 2-3 TIMES A WEEK
HAVE YOU OR SOMEONE ELSE BEEN INJURED AS A RESULT OF YOUR DRINKING: NO
HOW OFTEN DURING THE LAST YEAR HAVE YOU BEEN UNABLE TO REMEMBER WHAT HAPPENED THE NIGHT BEFORE BECAUSE YOU HAD BEEN DRINKING: NEVER
HAS A RELATIVE, FRIEND, DOCTOR, OR ANOTHER HEALTH PROFESSIONAL EXPRESSED CONCERN ABOUT YOUR DRINKING OR SUGGESTED YOU CUT DOWN: NO
HOW OFTEN DURING THE LAST YEAR HAVE YOU FAILED TO DO WHAT WAS NORMALLY EXPECTED FROM YOU BECAUSE OF DRINKING: NEVER
HOW OFTEN DURING THE LAST YEAR HAVE YOU FOUND THAT YOU WERE NOT ABLE TO STOP DRINKING ONCE YOU HAD STARTED: NEVER

## 2024-05-09 NOTE — PROGRESS NOTES
Medicare Annual Wellness Visit    Dominique Pink is here for Medicare AWV    Assessment & Plan   Medicare annual wellness visit, subsequent  Fibromyalgia  -     baclofen (LIORESAL) 20 MG tablet; Take 1 tablet by mouth 3 times daily as needed (muscle spasm/pain), Disp-90 tablet, R-2Normal  -     diazePAM (VALIUM) 5 MG tablet; Take 1 tablet by mouth nightly as needed for Anxiety for up to 90 days. Max Daily Amount: 5 mg, Disp-30 tablet, R-2Normal  CAMILLE (generalized anxiety disorder)  -     diazePAM (VALIUM) 5 MG tablet; Take 1 tablet by mouth nightly as needed for Anxiety for up to 90 days. Max Daily Amount: 5 mg, Disp-30 tablet, R-2Normal  Primary insomnia  -     diazePAM (VALIUM) 5 MG tablet; Take 1 tablet by mouth nightly as needed for Anxiety for up to 90 days. Max Daily Amount: 5 mg, Disp-30 tablet, R-2Normal  Other irritable bowel syndrome  Vertigo  -     diazePAM (VALIUM) 5 MG tablet; Take 1 tablet by mouth nightly as needed for Anxiety for up to 90 days. Max Daily Amount: 5 mg, Disp-30 tablet, R-2Normal  -     [facility] [place] [facility]  Abdominal adhesions  -     dicyclomine (BENTYL) 10 MG capsule; Take 1 capsule by mouth 3 times daily as needed (intestinal spasm and abdominal pain) PRN, Disp-120 capsule, R-1Normal  Recurrent abdominal pain  -     dicyclomine (BENTYL) 10 MG capsule; Take 1 capsule by mouth 3 times daily as needed (intestinal spasm and abdominal pain) PRN, Disp-120 capsule, R-1Normal  Left arm numbness  -     VL DUPLEX EXTRACRANIAL ARTERIES BILATERAL; Future  Left facial numbness  -     VL DUPLEX EXTRACRANIAL ARTERIES BILATERAL; Future    Recommendations for Preventive Services Due: see orders and patient instructions/AVS.    Patient with vertigo not controlled.  We will refill Valium and she will use it for that purpose as meclizine is not tolerated.  We will work to get her into therapy for the vestibular help.  Hopefully she can get the MRI of the neck soon.  In the meantime we 
Distal Radius Fracture

## 2024-05-15 ENCOUNTER — HOSPITAL ENCOUNTER (OUTPATIENT)
Dept: MRI IMAGING | Age: 69
Discharge: HOME | End: 2024-05-15
Payer: MEDICARE

## 2024-05-15 DIAGNOSIS — M54.12 CERVICAL RADICULOPATHY: ICD-10-CM

## 2024-05-15 PROCEDURE — 72141 MRI NECK SPINE W/O DYE: CPT

## 2024-05-24 ENCOUNTER — HOSPITAL ENCOUNTER (OUTPATIENT)
Age: 69
Discharge: HOME | End: 2024-05-24
Payer: MEDICARE

## 2024-05-24 ENCOUNTER — HOSPITAL ENCOUNTER (OUTPATIENT)
Dept: GENERAL RADIOLOGY | Age: 69
Discharge: HOME | End: 2024-05-24
Payer: MEDICARE

## 2024-05-24 DIAGNOSIS — M54.12 CERVICAL RADICULOPATHY: ICD-10-CM

## 2024-05-24 DIAGNOSIS — Z01.811 PRE-OP CHEST EXAM: ICD-10-CM

## 2024-05-24 LAB
ANION GAP SERPL CALC-SCNC: 15 MEQ/L (ref 8–16)
BASOPHILS ABSOLUTE: 0 THOU/MM3 (ref 0–0.1)
BASOPHILS NFR BLD AUTO: 0.6 %
BUN SERPL-MCNC: 10 MG/DL (ref 7–22)
CALCIUM SERPL-MCNC: 9.7 MG/DL (ref 8.5–10.5)
CHLORIDE SERPL-SCNC: 102 MEQ/L (ref 98–111)
CO2 SERPL-SCNC: 22 MEQ/L (ref 23–33)
CREAT SERPL-MCNC: 0.7 MG/DL (ref 0.4–1.2)
DEPRECATED RDW RBC AUTO: 47.4 FL (ref 35–45)
EOSINOPHIL NFR BLD AUTO: 1.1 %
EOSINOPHILS ABSOLUTE: 0.1 THOU/MM3 (ref 0–0.4)
ERYTHROCYTE [DISTWIDTH] IN BLOOD BY AUTOMATED COUNT: 13.2 % (ref 11.5–14.5)
GFR SERPL CREATININE-BSD FRML MDRD: > 90 ML/MIN/1.73M2
GLUCOSE SERPL-MCNC: 83 MG/DL (ref 70–108)
HCT VFR BLD AUTO: 46.8 % (ref 37–47)
HGB BLD-MCNC: 15 GM/DL (ref 12–16)
IMM GRANULOCYTES # BLD AUTO: 0.02 THOU/MM3 (ref 0–0.07)
IMM GRANULOCYTES NFR BLD AUTO: 0.3 %
LYMPHOCYTES ABSOLUTE: 1.4 THOU/MM3 (ref 1–4.8)
LYMPHOCYTES NFR BLD AUTO: 20.7 %
MCH RBC QN AUTO: 31.3 PG (ref 26–33)
MCHC RBC AUTO-ENTMCNC: 32.1 GM/DL (ref 32.2–35.5)
MCV RBC AUTO: 97.5 FL (ref 81–99)
MONOCYTES ABSOLUTE: 0.4 THOU/MM3 (ref 0.4–1.3)
MONOCYTES NFR BLD AUTO: 5.9 %
MRSA DNA SPEC QL NAA+PROBE: NEGATIVE
NEUTROPHILS ABSOLUTE: 4.7 THOU/MM3 (ref 1.8–7.7)
NEUTROPHILS NFR BLD AUTO: 71.4 %
NRBC BLD AUTO-RTO: 0 /100 WBC
PLATELET # BLD AUTO: 194 THOU/MM3 (ref 130–400)
PMV BLD AUTO: 11.3 FL (ref 9.4–12.4)
POTASSIUM SERPL-SCNC: 4.2 MEQ/L (ref 3.5–5.2)
PREALB SERPL-MCNC: 26.2 MG/DL (ref 20–40)
RBC # BLD AUTO: 4.8 MILL/MM3 (ref 4.2–5.4)
SCAN OF BLOOD SMEAR: NORMAL
SODIUM SERPL-SCNC: 139 MEQ/L (ref 135–145)
WBC # BLD AUTO: 6.6 THOU/MM3 (ref 4.8–10.8)

## 2024-05-24 PROCEDURE — 87641 MR-STAPH DNA AMP PROBE: CPT

## 2024-05-24 PROCEDURE — 84134 ASSAY OF PREALBUMIN: CPT

## 2024-05-24 PROCEDURE — 80048 BASIC METABOLIC PNL TOTAL CA: CPT

## 2024-05-24 PROCEDURE — 36415 COLL VENOUS BLD VENIPUNCTURE: CPT

## 2024-05-24 PROCEDURE — 85025 COMPLETE CBC W/AUTO DIFF WBC: CPT

## 2024-05-24 PROCEDURE — 71046 X-RAY EXAM CHEST 2 VIEWS: CPT

## 2024-05-28 ENCOUNTER — HOSPITAL ENCOUNTER (OUTPATIENT)
Dept: INTERVENTIONAL RADIOLOGY/VASCULAR | Age: 69
Discharge: HOME | End: 2024-05-30
Attending: FAMILY MEDICINE
Payer: MEDICARE

## 2024-05-28 DIAGNOSIS — R20.0 LEFT FACIAL NUMBNESS: ICD-10-CM

## 2024-05-28 DIAGNOSIS — R20.0 LEFT ARM NUMBNESS: ICD-10-CM

## 2024-05-28 PROCEDURE — 93880 EXTRACRANIAL BILAT STUDY: CPT

## 2024-05-31 ENCOUNTER — TELEMEDICINE (OUTPATIENT)
Dept: FAMILY MEDICINE CLINIC | Age: 69
End: 2024-05-31
Payer: MEDICARE

## 2024-05-31 DIAGNOSIS — E78.5 HYPERLIPIDEMIA, UNSPECIFIED HYPERLIPIDEMIA TYPE: ICD-10-CM

## 2024-05-31 DIAGNOSIS — F41.9 ANXIETY: ICD-10-CM

## 2024-05-31 DIAGNOSIS — R20.0 LEFT ARM NUMBNESS: ICD-10-CM

## 2024-05-31 DIAGNOSIS — M54.12 CERVICAL RADICULOPATHY AT C5: Chronic | ICD-10-CM

## 2024-05-31 DIAGNOSIS — M48.02 FORAMINAL STENOSIS OF CERVICAL REGION: ICD-10-CM

## 2024-05-31 DIAGNOSIS — R20.0 LEFT FACIAL NUMBNESS: ICD-10-CM

## 2024-05-31 DIAGNOSIS — M48.02 CERVICAL STENOSIS OF SPINAL CANAL: Primary | ICD-10-CM

## 2024-05-31 PROCEDURE — 1123F ACP DISCUSS/DSCN MKR DOCD: CPT | Performed by: FAMILY MEDICINE

## 2024-05-31 PROCEDURE — 99214 OFFICE O/P EST MOD 30 MIN: CPT | Performed by: FAMILY MEDICINE

## 2024-05-31 RX ORDER — HYDROXYZINE HYDROCHLORIDE 25 MG/1
25 TABLET, FILM COATED ORAL EVERY 6 HOURS PRN
Qty: 15 TABLET | Refills: 0 | Status: SHIPPED | OUTPATIENT
Start: 2024-05-31 | End: 2024-06-10

## 2024-05-31 RX ORDER — ATORVASTATIN CALCIUM 10 MG/1
10 TABLET, FILM COATED ORAL DAILY
Qty: 30 TABLET | Refills: 5 | Status: SHIPPED | OUTPATIENT
Start: 2024-05-31

## 2024-05-31 NOTE — PROGRESS NOTES
Dominique Pink, was evaluated through a synchronous (real-time) audio-video encounter. The patient (or guardian if applicable) is aware that this is a billable service, which includes applicable co-pays. This Virtual Visit was conducted with patient's (and/or legal guardian's) consent. Patient identification was verified, and a caregiver was present when appropriate.   The patient was located at Home: [address] [address]  Provider was located at Facility (Appt Dept): [address]  [place] [address]  Confirm you are appropriately licensed, registered, or certified to deliver care in the state where the patient is located as indicated above. If you are not or unsure, please re-schedule the visit: Yes, I confirm.     oDminique Pink (:  1955) is a Established patient, presenting virtually for evaluation of the following:    Assessment & Plan   Below is the assessment and plan developed based on review of pertinent history, physical exam, labs, studies, and medications.  1. Cervical stenosis of spinal canal  -     [facility]  -     [facility] - Crow Gómez MD  2. Foraminal stenosis of cervical region  -     [facility]  -     [facility] - Crow Gómez MD  3. Left arm numbness  -     [facility] ([facility] - Crow Gómez MD  4. Left facial numbness  -     [facility]  -     [facility] - Crow Gómez MD  5. Cervical radiculopathy at C5  -     [facility]  -     [facility] - Crow Gómez MD  6. Anxiety  -     hydrOXYzine HCl (ATARAX) 25 MG tablet; Take 1 tablet by mouth every 6 hours as needed for Itching, Disp-15 tablet, R-0Normal  7. Hyperlipidemia, unspecified hyperlipidemia type  -     atorvastatin (LIPITOR) 10 MG tablet; Take 1

## 2024-06-10 ENCOUNTER — OFFICE VISIT (OUTPATIENT)
Dept: FAMILY MEDICINE CLINIC | Age: 69
End: 2024-06-10
Payer: MEDICARE

## 2024-06-10 VITALS
WEIGHT: 132.4 LBS | SYSTOLIC BLOOD PRESSURE: 120 MMHG | TEMPERATURE: 97.1 F | HEIGHT: 68 IN | RESPIRATION RATE: 18 BRPM | HEART RATE: 88 BPM | OXYGEN SATURATION: 96 % | BODY MASS INDEX: 20.07 KG/M2 | DIASTOLIC BLOOD PRESSURE: 70 MMHG

## 2024-06-10 DIAGNOSIS — R42 VERTIGO: ICD-10-CM

## 2024-06-10 DIAGNOSIS — F41.1 GAD (GENERALIZED ANXIETY DISORDER): Chronic | ICD-10-CM

## 2024-06-10 DIAGNOSIS — F51.01 PRIMARY INSOMNIA: ICD-10-CM

## 2024-06-10 DIAGNOSIS — M54.12 CERVICAL RADICULOPATHY AT C5: Primary | Chronic | ICD-10-CM

## 2024-06-10 PROBLEM — M47.812 ARTHRITIS OF FACET JOINT OF CERVICAL SPINE: Status: ACTIVE | Noted: 2024-06-10

## 2024-06-10 PROCEDURE — 1123F ACP DISCUSS/DSCN MKR DOCD: CPT | Performed by: NURSE PRACTITIONER

## 2024-06-10 PROCEDURE — 99214 OFFICE O/P EST MOD 30 MIN: CPT | Performed by: NURSE PRACTITIONER

## 2024-06-10 RX ORDER — DOXEPIN HYDROCHLORIDE 25 MG/1
25 CAPSULE ORAL NIGHTLY
Qty: 30 CAPSULE | Refills: 0 | Status: SHIPPED | OUTPATIENT
Start: 2024-06-10

## 2024-06-10 RX ORDER — HYDROXYZINE HYDROCHLORIDE 25 MG/1
25 TABLET, FILM COATED ORAL EVERY 6 HOURS PRN
Qty: 15 TABLET | Refills: 0 | Status: CANCELLED | OUTPATIENT
Start: 2024-06-10 | End: 2024-06-20

## 2024-06-10 RX ORDER — BUSPIRONE HYDROCHLORIDE 5 MG/1
5 TABLET ORAL 2 TIMES DAILY
Qty: 60 TABLET | Refills: 0 | Status: SHIPPED | OUTPATIENT
Start: 2024-06-10 | End: 2024-07-10

## 2024-06-10 RX ORDER — CEPHALEXIN 500 MG/1
CAPSULE ORAL
COMMUNITY
Start: 2024-04-11 | End: 2024-06-10

## 2024-06-10 RX ORDER — HYDROCODONE BITARTRATE AND ACETAMINOPHEN 5; 325 MG/1; MG/1
TABLET ORAL
COMMUNITY
Start: 2024-05-21

## 2024-06-10 ASSESSMENT — ENCOUNTER SYMPTOMS
SORE THROAT: 0
VOMITING: 0
CHEST TIGHTNESS: 0
NAUSEA: 0
ABDOMINAL PAIN: 0
SHORTNESS OF BREATH: 0
COUGH: 0
EYE PAIN: 0

## 2024-06-10 NOTE — PROGRESS NOTES
every morning (before breakfast), Disp: 30 capsule, Rfl: 0    b complex vitamins capsule, Take 1 capsule by mouth daily, Disp: , Rfl:     lidocaine (XYLOCAINE) 5 % ointment, Apply topically as needed up to 5 times a day, Disp: 50 g, Rfl: 5    psyllium (METAMUCIL) 28.3 % POWD powder, Take 3.4 g by mouth as needed, Disp: , Rfl:     vitamin D (CHOLECALCIFEROL) 1000 UNIT TABS tablet, Take 1 tablet by mouth daily, Disp: , Rfl:     Multiple Vitamins-Minerals (THERAPEUTIC MULTIVITAMIN-MINERALS) tablet, Take 1 tablet by mouth daily, Disp: , Rfl:     atorvastatin (LIPITOR) 10 MG tablet, Take 1 tablet by mouth daily (Patient not taking: Reported on 6/10/2024), Disp: 30 tablet, Rfl: 5    The patient is allergic to aspirin, elavil [amitriptyline hcl], ibuprofen, morphine, percocet [oxycodone-acetaminophen], remeron [mirtazapine], and trazodone and nefazodone.    Past Medical History  Dominique  has a past medical history of Abdominal adhesions, Anxiety, Arthralgia, Arthritis, Cervical radiculopathy at C5, Chronic abdominal pain, Diverticulitis, Fibromyalgia, Hypercholesteremia, IBS (irritable bowel syndrome), Irritable bowel disease, Other irritable bowel syndrome, Primary osteoarthritis involving multiple joints, and Tension headache.    Past Surgical History  The patient  has a past surgical history that includes Appendectomy; Ovary removal; Tonsillectomy; Abdominal exploration surgery; Colonoscopy (11/15/11); Upper gastrointestinal endoscopy (11/15/11); Cholecystectomy (3/4/08); cervical fusion (oct 2008); other surgical history (12/9/13); colectomy (12/09/2013); Hip fracture surgery (Left, 06/2018); Colonoscopy (Left, 11/25/2020); Breast biopsy (Right, 12/14/2016); [facility] STEREO BREAST BX W LOC DEVICE 1ST LESION RIGHT (Right, 11/18/2016); and Total hip arthroplasty (Right, 1/23/2024).    Family History  This patient's family history includes Breast Cancer (age of onset: 40) in her maternal aunt; Cancer in her maternal

## 2024-06-14 ENCOUNTER — HOSPITAL ENCOUNTER (EMERGENCY)
Age: 69
Discharge: HOME | End: 2024-06-14
Payer: MEDICARE

## 2024-06-14 VITALS
HEART RATE: 90 BPM | WEIGHT: 135 LBS | BODY MASS INDEX: 20.53 KG/M2 | TEMPERATURE: 98 F | RESPIRATION RATE: 20 BRPM | OXYGEN SATURATION: 94 % | DIASTOLIC BLOOD PRESSURE: 84 MMHG | SYSTOLIC BLOOD PRESSURE: 144 MMHG

## 2024-06-14 DIAGNOSIS — L29.9 PRURITUS: ICD-10-CM

## 2024-06-14 DIAGNOSIS — L29.9 ITCHING OF BOTH HANDS: Primary | ICD-10-CM

## 2024-06-14 PROCEDURE — 99213 OFFICE O/P EST LOW 20 MIN: CPT

## 2024-06-14 PROCEDURE — 99213 OFFICE O/P EST LOW 20 MIN: CPT | Performed by: EMERGENCY MEDICINE

## 2024-06-14 RX ORDER — BETAMETHASONE DIPROPIONATE 0.5 MG/G
LOTION TOPICAL
Qty: 60 ML | Refills: 0 | Status: SHIPPED | OUTPATIENT
Start: 2024-06-14

## 2024-06-14 ASSESSMENT — PAIN - FUNCTIONAL ASSESSMENT: PAIN_FUNCTIONAL_ASSESSMENT: NONE - DENIES PAIN

## 2024-06-14 NOTE — DISCHARGE INSTRUCTIONS
Trial of betamethasone lotion to the hands and feet to see if this helps with itching and redness    Follow-up with primary care provider for further evaluation for concern of elevated liver enzymes and to reevaluate your hand and foot rash    Return for new or worsening symptoms

## 2024-06-14 NOTE — ED PROVIDER NOTES
Normal      psyllium (METAMUCIL) 28.3 % POWD powder Take 3.4 g by mouth as neededHistorical Med      vitamin D (CHOLECALCIFEROL) 1000 UNIT TABS tablet Take 1 tablet by mouth dailyHistorical Med      Multiple Vitamins-Minerals (THERAPEUTIC MULTIVITAMIN-MINERALS) tablet Take 1 tablet by mouth dailyHistorical Med             ALLERGIES     Patient is is allergic to aspirin, elavil [amitriptyline hcl], ibuprofen, morphine, percocet [oxycodone-acetaminophen], remeron [mirtazapine], and trazodone and nefazodone.    Patients   Immunization History   Administered Date(s) Administered    COVID-19, J&J, (age 18y+), IM, 0.5 mL 03/10/2021    COVID-19, PFIZER PURPLE top, DILUTE for use, (age 12 y+), 30mcg/0.3mL 12/20/2021    Influenza Virus Vaccine 11/04/2013, 10/10/2018    Influenza, FLUAD, (age 65 y+), Adjuvanted, 0.5mL 10/30/2021, 09/06/2022, 12/02/2023    Influenza, FLUARIX, FLULAVAL, FLUZONE (age 6 mo+) AND AFLURIA, (age 3 y+), PF, 0.5mL 11/07/2017, 10/10/2018    Influenza, FLUCELVAX, (age 6 mo+), MDCK, PF, 0.5mL 11/08/2019, 10/13/2020    Pneumococcal, PCV20, PREVNAR 20, (age 6w+), IM, 0.5mL 01/08/2024    Zoster Recombinant (Shingrix) 10/13/2020, 01/02/2021       FAMILY HISTORY     Patient's family history includes Breast Cancer (age of onset: 40) in her maternal aunt; Cancer in her maternal grandmother and maternal uncle; Colon Cancer in her father and paternal grandfather; High Blood Pressure in her mother; High Cholesterol in her mother.    SOCIAL HISTORY     Patient  reports that she quit smoking about 11 years ago. Her smoking use included cigarettes. She started smoking about 26 years ago. She has a 7.5 pack-year smoking history. She has never used smokeless tobacco. She reports current alcohol use of about 4.0 standard drinks of alcohol per week. She reports that she does not use drugs.    PHYSICAL EXAM     ED TRIAGE VITALS  BP: (!) 144/84, Temp: 98 °F (36.7 °C), Pulse: 90, Respirations: 20, SpO2: 94 %,Estimated body  mass index is 20.53 kg/m² as calculated from the following:    Height as of 6/10/24: 1.727 m (5' 8\").    Weight as of this encounter: 61.2 kg (135 lb).,No LMP recorded. Patient is postmenopausal.    Physical Exam  Constitutional:       General: She is not in acute distress.     Appearance: She is normal weight.   Eyes:      General: No scleral icterus.  Abdominal:      Tenderness: There is no abdominal tenderness. There is no guarding. Negative signs include Gonzalez's sign.   Musculoskeletal:      Right hand: No swelling.      Left hand: No swelling.      Right foot: No swelling.      Left foot: No swelling.      Comments: Mild erythema to the palms of the hands.  Plantar surfaces of the feet without rash, erythema, swelling   Skin:     General: Skin is warm and dry.      Coloration: Skin is not jaundiced.      Findings: No ecchymosis or rash.   Neurological:      Mental Status: She is alert.         DIAGNOSTIC RESULTS     Labs:No results found for this visit on 06/14/24.    IMAGING:    No orders to display         EKG:      URGENT CARE COURSE:     Vitals:    06/14/24 0855   BP: (!) 144/84   Pulse: 90   Resp: 20   Temp: 98 °F (36.7 °C)   TempSrc: Temporal   SpO2: 94%   Weight: 61.2 kg (135 lb)       Medications - No data to display         PROCEDURES:  None    FINAL IMPRESSION      1. Itching of both hands    2. Pruritus          DISPOSITION/ PLAN     Patient presents for chronic itching of both hands and feet.  Will place patient on betamethasone lotion to see if she has any resolution of the symptoms.  Patient had concerned that her liver enzymes may be elevated however she has no jaundice appearance.  No scleral icterus, her urine has not been dark cordova or concentrated in appearance and denies any abdominal pain or tenderness.  Advised the patient I cannot run liver enzymes at the urgent care but she may contact her primary care provider for follow-up for reassurance.      PATIENT REFERRED TO:  Chrissie Espinal

## 2024-06-14 NOTE — ED TRIAGE NOTES
Patient to room with c/o redness, dryness, and itching to bottom of feet beginning \"months\" ago. C/o redness, itching, and dryness to palms of bilateral hands beginning yesterday.

## 2024-06-19 ENCOUNTER — HOSPITAL ENCOUNTER (OUTPATIENT)
Dept: PHYSICAL THERAPY | Age: 69
Setting detail: THERAPIES SERIES
Discharge: HOME | End: 2024-06-19
Payer: MEDICARE

## 2024-06-19 PROCEDURE — 97162 PT EVAL MOD COMPLEX 30 MIN: CPT

## 2024-06-19 PROCEDURE — 97530 THERAPEUTIC ACTIVITIES: CPT

## 2024-06-19 PROCEDURE — 95992 CANALITH REPOSITIONING PROC: CPT

## 2024-06-19 NOTE — PROGRESS NOTES
stairs and a handrail to enter.  4 CHRIS , no HR  Task Previous Current   ADL’s  Independent Modified Independent increased dizziness bending over , looking up, rolling right and left   IADL's Independent Modified Independent   Ambulation Independent Modified Independent noticed balance off with walking   Transfers Independent Modified Independent dizziness rolling in bed   Recreation Independent Modified Independent   Community Integration Independent Modified Independent   Driving Active  Active    Work Retired  Retired       OBJECTIVE:  Pain Neck:  100% of the day, high 6/10, average 3/10  L UE to hand pain/numbness:  100% of the day, high 6/10, average 3/10   Palpation    Observation    Posture fair        Range of Motion Neck: WNL   Strength Cervical fair   Coordination WFL   Sensation Impaired - L UE to hand numbness since March 2024   Bed Mobility WFL   Transfers WFL   Ambulation Modified Independent  Distance: 50 feet  Surface: Level Tile  Device:No Device  Gait Deviations:  Slow Kimberly, Wide Base of Support, and Mild Path Deviations   Balance Timed up and go 32 seconds with near LOB turning   Special Tests Negative vertebral artery testing B  Occulomotor testing:  Smooth pursuit :  R:+ moderate nystagmus    L: + minimal nystagmus  Saccades:  R: negative  L: negative  Head thrust: R: + moderate nystagmus  L: + minimal nystagmus           TREATMENT   Precautions: Is going to schedule neck surgery for August 2024   Pain: Neck and R UE pain to midhumerus 3/10    \"X” in shaded column indicates activity completed today    “*\" next to exercise/intervention indicates progression   Modalities Parameters/  Location  Notes                     Manual Therapy Time/Technique  Notes                     Exercise/Intervention   Notes   Occulomotor testing:  Smooth pursuit :  R:+ moderate nystagmus    L: + minimal nystagmus  Saccades:  R: negative  L: negative  Head thrust: R: + moderate nystagmus  L: + minimal

## 2024-06-20 ENCOUNTER — HOSPITAL ENCOUNTER (OUTPATIENT)
Dept: PHYSICAL THERAPY | Age: 69
Setting detail: THERAPIES SERIES
End: 2024-06-20
Payer: MEDICARE

## 2024-06-21 ENCOUNTER — HOSPITAL ENCOUNTER (OUTPATIENT)
Dept: PHYSICAL THERAPY | Age: 69
Setting detail: THERAPIES SERIES
Discharge: HOME | End: 2024-06-21
Payer: MEDICARE

## 2024-06-21 PROCEDURE — 97112 NEUROMUSCULAR REEDUCATION: CPT

## 2024-06-21 NOTE — PROGRESS NOTES
Home Exercise Program, Patient Education, Positioning, and Vestibular Rehabilitation    []  Plan of care initiated.  Plan to see patient 2 times per week for 5 weeks to address the treatment planned outlined above.  [x]  Continue with current plan of care  []  Modify plan of care as follows:    []  Hold pending physician visit  []  Discharge    Time In 1145   Time Out 1208   Timed Code Minutes: 23 min   Total Treatment Time: 23 min       Electronically Signed by: Leah Noble PT

## 2024-06-24 ENCOUNTER — HOSPITAL ENCOUNTER (OUTPATIENT)
Dept: PHYSICAL THERAPY | Age: 69
Setting detail: THERAPIES SERIES
Discharge: HOME | End: 2024-06-24
Payer: MEDICARE

## 2024-06-24 PROCEDURE — 95992 CANALITH REPOSITIONING PROC: CPT

## 2024-06-24 PROCEDURE — 97530 THERAPEUTIC ACTIVITIES: CPT

## 2024-06-24 NOTE — PROGRESS NOTES
[facility]  PHYSICAL THERAPY  [] EVALUATION  [x] DAILY NOTE (LAND) [] DAILY NOTE (AQUATIC ) [] PROGRESS NOTE [] DISCHARGE NOTE    [] [facility] [place]   [x] [place] [facility]    [] [facility]   [] [facility]    Date: 2024  Patient Name:  Dominique Pink  : 1955  MRN: [id number]  CSN: [id number]    Referring Practitioner Chrissie Espinal MD    Diagnosis Dizziness and giddiness   Treatment Diagnosis H81.13  Benign paroxysmal vertigo, bilateral  H81.93 Vestibular dysfunction; bilaterally  R26.81  Unsteadiness on feet  R42   Dizziness and giddiness  R53.1 Weakness  M62.81 Generalized Weakness   Date of Evaluation 24    Additional Pertinent History  has a past medical history of Abdominal adhesions (2015), Anxiety, Arthralgia, Arthritis, Cervical radiculopathy at C5 (2015), Chronic abdominal pain, Diverticulitis, Fibromyalgia (2015), Hypercholesteremia (2022), IBS (irritable bowel syndrome), Irritable bowel disease, Other irritable bowel syndrome (2022), Primary osteoarthritis involving multiple joints (2019), and Tension headache (2015).   R THR 2024, is going to reschedule neck surgery for 2024, starting PT for neck       Functional Outcome Measure Used Dizziness handicap inventory   Functional Outcome Score Eval 62/100 (24)       Insurance: Primary: Payor: [employer] /  /  / ,   Secondary:    Authorization Information: OUTPATIENT BENEFITS:               DEDUCTIBLE: $NA               OUT OF POCKET: $5300 MET $1441.17              INSURANCE PAYS AT: 100% after copay              PATIENT RESPONSIBILITY AND/OR CO-PAY: $40 copay per visit  SECONDARY INSURANCE COMPANY:  NA      PRE CERTIFICATION REQUIRED: No precert required.  INSURANCE THERAPY BENEFIT: NO LIMIT BASED ON MED NECESSITY. .   AQUATIC THERAPY COVERED: YES  MODALITIES COVERED:  Yes  TELEHEALTH

## 2024-06-25 ENCOUNTER — HOSPITAL ENCOUNTER (OUTPATIENT)
Dept: PHYSICAL THERAPY | Age: 69
Setting detail: THERAPIES SERIES
End: 2024-06-25
Payer: MEDICARE

## 2024-06-26 ENCOUNTER — HOSPITAL ENCOUNTER (OUTPATIENT)
Dept: PHYSICAL THERAPY | Age: 69
Setting detail: THERAPIES SERIES
Discharge: HOME | End: 2024-06-26
Payer: MEDICARE

## 2024-06-26 PROCEDURE — 97530 THERAPEUTIC ACTIVITIES: CPT

## 2024-06-26 NOTE — PROGRESS NOTES
[facility]  PHYSICAL THERAPY  [] EVALUATION  [x] DAILY NOTE (LAND) [] DAILY NOTE (AQUATIC ) [] PROGRESS NOTE [] DISCHARGE NOTE    [] [facility] [place]   [x] [place] [facility]    [] [facility]   [] [facility]    Date: 2024  Patient Name:  Dominique Pink  : 1955  MRN: [id number]  CSN: [id number]    Referring Practitioner Chrissie Espinal MD    Diagnosis Dizziness and giddiness   Treatment Diagnosis H81.13  Benign paroxysmal vertigo, bilateral  H81.93 Vestibular dysfunction; bilaterally  R26.81  Unsteadiness on feet  R42   Dizziness and giddiness  R53.1 Weakness  M62.81 Generalized Weakness   Date of Evaluation 24    Additional Pertinent History  has a past medical history of Abdominal adhesions (2015), Anxiety, Arthralgia, Arthritis, Cervical radiculopathy at C5 (2015), Chronic abdominal pain, Diverticulitis, Fibromyalgia (2015), Hypercholesteremia (2022), IBS (irritable bowel syndrome), Irritable bowel disease, Other irritable bowel syndrome (2022), Primary osteoarthritis involving multiple joints (2019), and Tension headache (2015).   R THR 2024, is going to reschedule neck surgery for 2024, starting PT for neck       Functional Outcome Measure Used Dizziness handicap inventory   Functional Outcome Score Eval 62/100 (24)       Insurance: Primary: Payor: [employer] /  /  / ,   Secondary:    Authorization Information: OUTPATIENT BENEFITS:               DEDUCTIBLE: $NA               OUT OF POCKET: $5300 MET $1441.17              INSURANCE PAYS AT: 100% after copay              PATIENT RESPONSIBILITY AND/OR CO-PAY: $40 copay per visit  SECONDARY INSURANCE COMPANY:  NA      PRE CERTIFICATION REQUIRED: No precert required.  INSURANCE THERAPY BENEFIT: NO LIMIT BASED ON MED NECESSITY. .   AQUATIC THERAPY COVERED: YES  MODALITIES COVERED:  Yes  TELEHEALTH

## 2024-06-28 ENCOUNTER — APPOINTMENT (OUTPATIENT)
Dept: PHYSICAL THERAPY | Age: 69
End: 2024-06-28
Payer: MEDICARE

## 2024-07-03 ENCOUNTER — APPOINTMENT (OUTPATIENT)
Dept: PHYSICAL THERAPY | Age: 69
End: 2024-07-03
Payer: MEDICARE

## 2024-07-03 DIAGNOSIS — F41.1 GAD (GENERALIZED ANXIETY DISORDER): Chronic | ICD-10-CM

## 2024-07-03 DIAGNOSIS — F51.01 PRIMARY INSOMNIA: ICD-10-CM

## 2024-07-03 RX ORDER — BUSPIRONE HYDROCHLORIDE 5 MG/1
5 TABLET ORAL 2 TIMES DAILY
Qty: 180 TABLET | Refills: 1 | Status: SHIPPED | OUTPATIENT
Start: 2024-07-03 | End: 2024-07-05

## 2024-07-03 RX ORDER — DOXEPIN HYDROCHLORIDE 25 MG/1
25 CAPSULE ORAL NIGHTLY
Qty: 90 CAPSULE | Refills: 1 | Status: SHIPPED | OUTPATIENT
Start: 2024-07-03

## 2024-07-03 NOTE — TELEPHONE ENCOUNTER
Dominique Pink called requesting a refill on the following medications:  Requested Prescriptions     Pending Prescriptions Disp Refills    busPIRone (BUSPAR) 5 MG tablet 60 tablet 0     Sig: Take 1 tablet by mouth 2 times daily       Date of last visit: 6/10/2024  Date of next visit (if applicable):11/14/2024  Date of last refill: 6/10/2024   Pharmacy Name: [facility],  Eufemia Jiménez MA

## 2024-07-09 ENCOUNTER — HOSPITAL ENCOUNTER (OUTPATIENT)
Dept: PHYSICAL THERAPY | Age: 69
Setting detail: THERAPIES SERIES
Discharge: HOME | End: 2024-07-09
Payer: MEDICARE

## 2024-07-09 PROCEDURE — 97110 THERAPEUTIC EXERCISES: CPT

## 2024-07-09 PROCEDURE — 97530 THERAPEUTIC ACTIVITIES: CPT

## 2024-07-09 PROCEDURE — 97162 PT EVAL MOD COMPLEX 30 MIN: CPT

## 2024-07-09 PROCEDURE — 97035 APP MDLTY 1+ULTRASOUND EA 15: CPT

## 2024-07-09 NOTE — PROGRESS NOTES
COVERED:     Approved Procedure Codes: 85036 - Therapeutic Exercise    27692 - Gait Training  66621 - Therapeutic Activities  94732 - Canalith Repositioning Procedure  (Codes requested indicated by red font, codes approved indicated by black font)   Visit # 5, 5/10 for progress note (Reporting Period: 6/19/24 to     )   Visits Allowed: unlimited   Recertification Date: 9/19/24   Physician Follow-Up: November 2024.  EMG 7/11 with Dr. Gómez, consult with neurosurgery Sumanth CASTRO on 7/17   Physician Orders:    History of Present Illness: April 29th rolled to right and severe dizziness, 10/10 severe dizziness.  Fell back back onto bed, had severe dizziness for 2 weeks.  Did see family MD , took meclizine and zofran but both \"bothered my dizziness\".   + dizziness with looking up 5/10, bending over dizziness 5/10.  Rolling right dizziness 5-10/10, rolling left was producing dizziness 3/10.    Increased neck pain x 6 months.  Patient back to Dr. Luther and MRI ordered, recommended surgery due to spur in neck, was scheduled for 6/19 but cancelled due to anxiety and vertigo.   Difficulty moving head to left and look down.     SUBJECTIVE: reports able to bend over and look up with no dizziness, does not meclizine due to fatigue x 2-3 weeks, does feel slight \"off or dizziness\" 2/10 with rolling right or left.        OBJECTIVE:  TREATMENT   Precautions: Is going to schedule neck surgery for August 2024   Pain: Neck and R UE pain to midhumerus    \"X” in shaded column indicates activity completed today    “*\" next to exercise/intervention indicates progression   Modalities Parameters/  Location  Notes                     Manual Therapy Time/Technique  Notes                     Exercise/Intervention   Notes   Occulomotor testing:  Smooth pursuit :  R:+ mild nystagmus    L: negative  Saccades:  R: negative  L: negative  Head thrust: negative B   x    R jodi lilly pike Negative with  no dizziness or nystagmus  x    B horizontal

## 2024-07-09 NOTE — PROGRESS NOTES
- L jaw/cheek numbness , L UE to thumb numbness 100 % of the day since March   Bed Mobility WFL   Transfers WFL   Ambulation Modified Independent  Distance: 50 feet  Surface: Level Tile  Device:No Device  Gait Deviations:  Forward Flexed Posture   Balance Not Tested   Special Tests Negative vertebral artery testing           TREATMENT   Precautions: Also had dizziness/BPPV with current PT , 2008 cervical C5-7 fusion   Pain: Neck 2/10, L UE to thumb 4/10, L jaw/cheek numbness 4/10    \"X” in shaded column indicates activity completed today    “*\" next to exercise/intervention indicates progression   Modalities Parameters/  Location  Notes   US 50% pulsed, 1 MHz cervical spine, sitting, hair in rubber band 8 minutes, 1.0 intensity.                   Manual Therapy Time/Technique  Notes                     Exercise/Intervention   Notes   Backward shoulder circles 10x  x    Scapular retraction elbows bent 10x  x    Cervical isometric rotation R/L 5 x 5 seconds  x                                                              Specific Interventions Next Treatment: scapular exercises, cervical isometric sitting/supine if needed, manual cervical traction, progress to AROM/stretching cervical spine and L UE once pain lessens, US for neck pain, if not effective could try estim manual    Activity/Treatment Tolerance:  [x]  Patient tolerated treatment well  []  Patient limited by fatigue  []  Patient limited by pain   []  Patient limited by medical complications  []  Other:     Assessment:  PT for neck pain with modalities for pain control, US or estim, exercises to improve AROM, strength, lessen pain  Body Structures/Functions/Activity Limitations: impaired balance, impaired ROM, impaired strength, and pain  Prognosis: good    GOALS:  Patient Goal: to lessen my neck pain    Short Term Goals:  Time Frame: deferred to LTG's    Long Term Goals:  Time Frame: 6 weeks  Increase AROM cervical rotation B to 70, lateral flexion to 40,

## 2024-07-11 ENCOUNTER — PROCEDURE VISIT (OUTPATIENT)
Dept: NEUROLOGY | Age: 69
End: 2024-07-11

## 2024-07-11 DIAGNOSIS — M54.12 CERVICAL RADICULOPATHY: ICD-10-CM

## 2024-07-11 DIAGNOSIS — R20.0 LEFT ARM NUMBNESS: Primary | ICD-10-CM

## 2024-07-11 DIAGNOSIS — M54.2 NECK PAIN: ICD-10-CM

## 2024-07-12 ENCOUNTER — APPOINTMENT (OUTPATIENT)
Dept: PHYSICAL THERAPY | Age: 69
End: 2024-07-12
Payer: MEDICARE

## 2024-07-16 ENCOUNTER — HOSPITAL ENCOUNTER (OUTPATIENT)
Dept: PHYSICAL THERAPY | Age: 69
Setting detail: THERAPIES SERIES
Discharge: HOME | End: 2024-07-16
Payer: MEDICARE

## 2024-07-16 PROCEDURE — 97035 APP MDLTY 1+ULTRASOUND EA 15: CPT

## 2024-07-16 PROCEDURE — 97110 THERAPEUTIC EXERCISES: CPT

## 2024-07-16 NOTE — PROGRESS NOTES
[facility]  PHYSICAL THERAPY  [] EVALUATION  [x] DAILY NOTE (LAND) [] DAILY NOTE (AQUATIC ) [] PROGRESS NOTE [] DISCHARGE NOTE    [] [facility] [place]   [x] [place] [facility]    [] [facility]   [] [facility]    Date: 2024  Patient Name:  Dominique Pink  : 1955  MRN: [id number]  CSN: [id number]    Referring Practitioner Dillon Luther MD    Diagnosis Cervicalgia  Arthrodesis status   Treatment Diagnosis M54.2  Neck Pain  M54.2, G89.29  Chronic Neck Pain  R53.1 Weakness  M25.60 Stiffness of Unspecified Joint   Date of Evaluation 24    Additional Pertinent History  has a past medical history of Abdominal adhesions (2015), Anxiety, Arthralgia, Arthritis, Cervical radiculopathy at C5 (2015), Chronic abdominal pain, Diverticulitis, Fibromyalgia (2015), Hypercholesteremia (2022), IBS (irritable bowel syndrome), Irritable bowel disease, Other irritable bowel syndrome (2022), Primary osteoarthritis involving multiple joints (2019), and Tension headache (2015).   R THR .  Cervical fusion 16 years ago by Dr. Luther at C5-7 in       Functional Outcome Measure Used Neck disability scale   Functional Outcome Score Eval 22 (24)       Insurance: Primary: Payor: AETNA MEDICARE /  /  / ,   Secondary:    Authorization Information: [id number]              CSN:   PRIMARY INSURANCE COMPANY: [employer] O   INSURANCE COMPANY REPRESENTATIVE:  [facility]  INSURANCE [employer] TELEPHONE NUMBER:     INSURANCE COMPANY FAX NUMBER:    OUTPATIENT BENEFITS:               DEDUCTIBLE: $NA               OUT OF POCKET: $5300 MET $1486.17              INSURANCE PAYS AT: 100% after copay              PATIENT RESPONSIBILITY AND/OR CO-PAY: $40 copay per visit  SECONDARY INSURANCE COMPANY:        PRE CERTIFICATION REQUIRED: No precert required.  INSURANCE THERAPY BENEFIT: UNLIMITED VISITS BASED ON MEDICAL

## 2024-07-17 ENCOUNTER — OFFICE VISIT (OUTPATIENT)
Dept: NEUROSURGERY | Age: 69
End: 2024-07-17
Payer: MEDICARE

## 2024-07-17 VITALS
HEART RATE: 54 BPM | HEIGHT: 68 IN | BODY MASS INDEX: 20.76 KG/M2 | SYSTOLIC BLOOD PRESSURE: 124 MMHG | DIASTOLIC BLOOD PRESSURE: 72 MMHG | WEIGHT: 137 LBS

## 2024-07-17 DIAGNOSIS — M50.31 ADJACENT SEGMENT DISEASE OF HIGH CERVICAL REGION OF SPINE WITH HISTORY OF FUSION PROCEDURE: Primary | ICD-10-CM

## 2024-07-17 DIAGNOSIS — Z98.1 ADJACENT SEGMENT DISEASE OF HIGH CERVICAL REGION OF SPINE WITH HISTORY OF FUSION PROCEDURE: Primary | ICD-10-CM

## 2024-07-17 PROCEDURE — 99204 OFFICE O/P NEW MOD 45 MIN: CPT | Performed by: PHYSICIAN ASSISTANT

## 2024-07-17 PROCEDURE — 1123F ACP DISCUSS/DSCN MKR DOCD: CPT | Performed by: PHYSICIAN ASSISTANT

## 2024-07-17 ASSESSMENT — ENCOUNTER SYMPTOMS
CHEST TIGHTNESS: 0
SHORTNESS OF BREATH: 0
APNEA: 0

## 2024-07-17 NOTE — PROGRESS NOTES
Rfl:     vitamin D (CHOLECALCIFEROL) 1000 UNIT TABS tablet, Take 1 tablet by mouth daily, Disp: , Rfl:     Multiple Vitamins-Minerals (THERAPEUTIC MULTIVITAMIN-MINERALS) tablet, Take 1 tablet by mouth daily, Disp: , Rfl:     betamethasone dipropionate 0.05 % lotion, Apply topically 2 times daily. (Patient not taking: Reported on 7/17/2024), Disp: 60 mL, Rfl: 0    atorvastatin (LIPITOR) 10 MG tablet, Take 1 tablet by mouth daily (Patient not taking: Reported on 6/10/2024), Disp: 30 tablet, Rfl: 5    The patient is allergic to aspirin, elavil [amitriptyline hcl], ibuprofen, morphine, percocet [oxycodone-acetaminophen], remeron [mirtazapine], and trazodone and nefazodone.    Past Medical History  Dominique  has a past medical history of Abdominal adhesions, Anxiety, Arthralgia, Arthritis, Cervical radiculopathy at C5, Chronic abdominal pain, Diverticulitis, Fibromyalgia, Hypercholesteremia, IBS (irritable bowel syndrome), Irritable bowel disease, Other irritable bowel syndrome, Primary osteoarthritis involving multiple joints, and Tension headache.    Past Surgical History  The patient  has a past surgical history that includes Appendectomy; Ovary removal; Tonsillectomy; Abdominal exploration surgery; Colonoscopy (11/15/11); Upper gastrointestinal endoscopy (11/15/11); Cholecystectomy (3/4/08); cervical fusion (oct 2008); other surgical history (12/9/13); colectomy (12/09/2013); Hip fracture surgery (Left, 06/2018); Colonoscopy (Left, 11/25/2020); Breast biopsy (Right, 12/14/2016); [facility] STEREO BREAST BX W LOC DEVICE 1ST LESION RIGHT (Right, 11/18/2016); and Total hip arthroplasty (Right, 1/23/2024).    Family History  This patient's family history includes Breast Cancer (age of onset: 40) in her maternal aunt; Cancer in her maternal grandmother and maternal uncle; Colon Cancer in her father and paternal grandfather; High Blood Pressure in her mother; High Cholesterol in her mother.    Social History  Dominique  reports that

## 2024-07-22 DIAGNOSIS — E78.5 HYPERLIPIDEMIA, UNSPECIFIED HYPERLIPIDEMIA TYPE: ICD-10-CM

## 2024-07-22 RX ORDER — ATORVASTATIN CALCIUM 10 MG/1
10 TABLET, FILM COATED ORAL DAILY
Qty: 30 TABLET | Refills: 5 | Status: CANCELLED | OUTPATIENT
Start: 2024-07-22

## 2024-07-23 ENCOUNTER — TELEPHONE (OUTPATIENT)
Dept: FAMILY MEDICINE CLINIC | Age: 69
End: 2024-07-23

## 2024-07-23 ENCOUNTER — APPOINTMENT (OUTPATIENT)
Dept: PHYSICAL THERAPY | Age: 69
End: 2024-07-23
Payer: MEDICARE

## 2024-07-23 DIAGNOSIS — U07.1 ACUTE COVID-19: Primary | ICD-10-CM

## 2024-07-23 NOTE — TELEPHONE ENCOUNTER
Recommend aggressive hydration, including sodium containing fluids.  Start paxlovid sent to [facility] [place].    Okkristen to take ibuprofen or tylenol.

## 2024-07-23 NOTE — TELEPHONE ENCOUNTER
Patient tested positive covid today    Symptoms started yesterday. Has fever, chills, muscle aches, cough, severe headache, and sore throat.    States that she hurts all over. Would like to know what she could take to help and if you would be able to prescribe anything to help.

## 2024-07-25 ENCOUNTER — TELEPHONE (OUTPATIENT)
Dept: FAMILY MEDICINE CLINIC | Age: 69
End: 2024-07-25

## 2024-07-26 NOTE — TELEPHONE ENCOUNTER
Patient notified  States she is currently a patient of Dr Luther  States she is currently having PT as insurance requirement

## 2024-07-26 NOTE — TELEPHONE ENCOUNTER
Please let patient know that the EMG results are now available.  It shows a chronic left side C5-6 and C6-7 chronic radiculopathy or pinching of the nerve and there is some mild left carpal tunnel as well.  I recommend that she discuss the neck issues with Dr. Luther. Let's send a copy of this to his office.

## 2024-07-30 ENCOUNTER — HOSPITAL ENCOUNTER (OUTPATIENT)
Dept: PHYSICAL THERAPY | Age: 69
Setting detail: THERAPIES SERIES
Discharge: HOME | End: 2024-07-30
Payer: MEDICARE

## 2024-07-30 PROCEDURE — 97110 THERAPEUTIC EXERCISES: CPT

## 2024-07-30 PROCEDURE — 97035 APP MDLTY 1+ULTRASOUND EA 15: CPT

## 2024-07-30 NOTE — PROGRESS NOTES
[facility]  PHYSICAL THERAPY  [] EVALUATION  [x] DAILY NOTE (LAND) [] DAILY NOTE (AQUATIC ) [] PROGRESS NOTE [] DISCHARGE NOTE    [] [facility] [place]   [x] [place] [facility]    [] [facility]   [] [facility]    Date: 2024  Patient Name:  Dominique Pink  : 1955  MRN: [id number]  CSN: [id number]    Referring Practitioner Dillon Luther MD    Diagnosis Cervicalgia  Arthrodesis status   Treatment Diagnosis M54.2  Neck Pain  M54.2, G89.29  Chronic Neck Pain  R53.1 Weakness  M25.60 Stiffness of Unspecified Joint   Date of Evaluation 24    Additional Pertinent History  has a past medical history of Abdominal adhesions (2015), Anxiety, Arthralgia, Arthritis, Cervical radiculopathy at C5 (2015), Chronic abdominal pain, Diverticulitis, Fibromyalgia (2015), Hypercholesteremia (2022), IBS (irritable bowel syndrome), Irritable bowel disease, Other irritable bowel syndrome (2022), Primary osteoarthritis involving multiple joints (2019), and Tension headache (2015).   R THR .  Cervical fusion 16 years ago by Dr. Luther at C5-7 in       Functional Outcome Measure Used Neck disability scale   Functional Outcome Score Eval 22 (24)       Insurance: Primary: Payor: AETNA MEDICARE /  /  / ,   Secondary:    Authorization Information: [id number]              CSN:   PRIMARY INSURANCE COMPANY: [employer] MEDIC[employer] O   INSURANCE COMPANY REPRESENTATIVE:  [facility]  INSURANCE [employer] TELEPHONE NUMBER:     INSURANCE COMPANY FAX NUMBER:    OUTPATIENT BENEFITS:               DEDUCTIBLE: $NA               OUT OF POCKET: $5300 MET $1486.17              INSURANCE PAYS AT: 100% after copay              PATIENT RESPONSIBILITY AND/OR CO-PAY: $40 copay per visit  SECONDARY INSURANCE COMPANY:        PRE CERTIFICATION REQUIRED: No precert required.  INSURANCE THERAPY BENEFIT: UNLIMITED VISITS BASED ON MEDICAL

## 2024-08-06 ENCOUNTER — HOSPITAL ENCOUNTER (OUTPATIENT)
Dept: PHYSICAL THERAPY | Age: 69
Setting detail: THERAPIES SERIES
Discharge: HOME | End: 2024-08-06
Payer: MEDICARE

## 2024-08-06 PROCEDURE — 97110 THERAPEUTIC EXERCISES: CPT

## 2024-08-06 PROCEDURE — 97035 APP MDLTY 1+ULTRASOUND EA 15: CPT

## 2024-08-06 PROCEDURE — 97530 THERAPEUTIC ACTIVITIES: CPT

## 2024-08-06 NOTE — DISCHARGE SUMMARY
[facility]  PHYSICAL THERAPY  [] EVALUATION  [] DAILY NOTE (LAND) [] DAILY NOTE (AQUATIC ) [] PROGRESS NOTE [x] DISCHARGE NOTE    [] [facility] [place]   [x] [place] [facility]    [] [facility]   [] [facility]    Date: 2024  Patient Name:  Dominique Pink  : 1955  MRN: [id number]  CSN: [id number]    Referring Practitioner Chrissie Espinal MD    Diagnosis Dizziness and giddiness   Treatment Diagnosis H81.13  Benign paroxysmal vertigo, bilateral  H81.93 Vestibular dysfunction; bilaterally  R26.81  Unsteadiness on feet  R42   Dizziness and giddiness  R53.1 Weakness  M62.81 Generalized Weakness   Date of Evaluation 24    Additional Pertinent History  has a past medical history of Abdominal adhesions (2015), Anxiety, Arthralgia, Arthritis, Cervical radiculopathy at C5 (2015), Chronic abdominal pain, Diverticulitis, Fibromyalgia (2015), Hypercholesteremia (2022), IBS (irritable bowel syndrome), Irritable bowel disease, Other irritable bowel syndrome (2022), Primary osteoarthritis involving multiple joints (2019), and Tension headache (2015).   R THR 2024, is going to reschedule neck surgery for 2024, starting PT for neck       Functional Outcome Measure Used Dizziness handicap inventory   Functional Outcome Score Eval 62/100 (24) .  Discharge 0100      Insurance: Primary: Payor: [employer] /  /  / ,   Secondary:    Authorization Information: OUTPATIENT BENEFITS:               DEDUCTIBLE: $NA               OUT OF POCKET: $5300 MET $1441.17              INSURANCE PAYS AT: 100% after copay              PATIENT RESPONSIBILITY AND/OR CO-PAY: $40 copay per visit  SECONDARY INSURANCE COMPANY:  NA      PRE CERTIFICATION REQUIRED: No precert required.  INSURANCE THERAPY BENEFIT: NO LIMIT BASED ON MED NECESSITY. .   AQUATIC THERAPY COVERED: YES  MODALITIES COVERED:

## 2024-08-08 DIAGNOSIS — M54.14 RADICULOPATHY, THORACIC REGION: ICD-10-CM

## 2024-08-08 DIAGNOSIS — M54.9 UPPER BACK PAIN ON LEFT SIDE: ICD-10-CM

## 2024-08-08 NOTE — TELEPHONE ENCOUNTER
[facility] received script transferred from [facility] for lidocaine ointment  States insurance is requiring where the ointment is to be applied and how long the tube is expected to last

## 2024-08-09 RX ORDER — LIDOCAINE 50 MG/G
OINTMENT TOPICAL
Qty: 50 G | Refills: 5 | Status: SHIPPED | OUTPATIENT
Start: 2024-08-09

## 2024-08-12 ENCOUNTER — HOSPITAL ENCOUNTER (OUTPATIENT)
Age: 69
Discharge: HOME | End: 2024-08-12
Payer: MEDICARE

## 2024-08-12 LAB
ANION GAP SERPL CALC-SCNC: 13 MEQ/L (ref 8–16)
BASOPHILS ABSOLUTE: 0.1 THOU/MM3 (ref 0–0.1)
BASOPHILS NFR BLD AUTO: 1 %
BUN SERPL-MCNC: 13 MG/DL (ref 7–22)
CALCIUM SERPL-MCNC: 9.5 MG/DL (ref 8.5–10.5)
CHLORIDE SERPL-SCNC: 105 MEQ/L (ref 98–111)
CO2 SERPL-SCNC: 23 MEQ/L (ref 23–33)
CREAT SERPL-MCNC: 0.8 MG/DL (ref 0.4–1.2)
DEPRECATED RDW RBC AUTO: 47.4 FL (ref 35–45)
EOSINOPHIL NFR BLD AUTO: 2.4 %
EOSINOPHILS ABSOLUTE: 0.2 THOU/MM3 (ref 0–0.4)
ERYTHROCYTE [DISTWIDTH] IN BLOOD BY AUTOMATED COUNT: 13.6 % (ref 11.5–14.5)
GFR SERPL CREATININE-BSD FRML MDRD: 80 ML/MIN/1.73M2
GLUCOSE SERPL-MCNC: 86 MG/DL (ref 70–108)
HCT VFR BLD AUTO: 43.2 % (ref 37–47)
HGB BLD-MCNC: 14.3 GM/DL (ref 12–16)
IMM GRANULOCYTES # BLD AUTO: 0.02 THOU/MM3 (ref 0–0.07)
IMM GRANULOCYTES NFR BLD AUTO: 0.3 %
LYMPHOCYTES ABSOLUTE: 1.7 THOU/MM3 (ref 1–4.8)
LYMPHOCYTES NFR BLD AUTO: 26.8 %
MCH RBC QN AUTO: 31.4 PG (ref 26–33)
MCHC RBC AUTO-ENTMCNC: 33.1 GM/DL (ref 32.2–35.5)
MCV RBC AUTO: 94.9 FL (ref 81–99)
MONOCYTES ABSOLUTE: 0.5 THOU/MM3 (ref 0.4–1.3)
MONOCYTES NFR BLD AUTO: 8 %
MRSA DNA SPEC QL NAA+PROBE: NEGATIVE
NEUTROPHILS ABSOLUTE: 3.9 THOU/MM3 (ref 1.8–7.7)
NEUTROPHILS NFR BLD AUTO: 61.5 %
NRBC BLD AUTO-RTO: 0 /100 WBC
PLATELET # BLD AUTO: 274 THOU/MM3 (ref 130–400)
PMV BLD AUTO: 10 FL (ref 9.4–12.4)
POTASSIUM SERPL-SCNC: 4.9 MEQ/L (ref 3.5–5.2)
RBC # BLD AUTO: 4.55 MILL/MM3 (ref 4.2–5.4)
SODIUM SERPL-SCNC: 141 MEQ/L (ref 135–145)
WBC # BLD AUTO: 6.3 THOU/MM3 (ref 4.8–10.8)

## 2024-08-12 PROCEDURE — 85025 COMPLETE CBC W/AUTO DIFF WBC: CPT

## 2024-08-12 PROCEDURE — 87641 MR-STAPH DNA AMP PROBE: CPT

## 2024-08-12 PROCEDURE — 80048 BASIC METABOLIC PNL TOTAL CA: CPT

## 2024-08-12 PROCEDURE — 36415 COLL VENOUS BLD VENIPUNCTURE: CPT

## 2024-08-22 ENCOUNTER — OFFICE VISIT (OUTPATIENT)
Dept: FAMILY MEDICINE CLINIC | Age: 69
End: 2024-08-22

## 2024-08-22 VITALS
RESPIRATION RATE: 16 BRPM | SYSTOLIC BLOOD PRESSURE: 122 MMHG | HEIGHT: 68 IN | WEIGHT: 139 LBS | OXYGEN SATURATION: 98 % | DIASTOLIC BLOOD PRESSURE: 70 MMHG | HEART RATE: 70 BPM | BODY MASS INDEX: 21.07 KG/M2

## 2024-08-22 DIAGNOSIS — R11.0 NAUSEA: ICD-10-CM

## 2024-08-22 DIAGNOSIS — M50.30 DEGENERATIVE CERVICAL DISC: ICD-10-CM

## 2024-08-22 DIAGNOSIS — Z01.818 PREOPERATIVE EXAMINATION: Primary | ICD-10-CM

## 2024-08-22 DIAGNOSIS — K21.9 GASTROESOPHAGEAL REFLUX DISEASE WITHOUT ESOPHAGITIS: ICD-10-CM

## 2024-08-22 DIAGNOSIS — R29.898 UPPER EXTREMITY WEAKNESS: ICD-10-CM

## 2024-08-22 DIAGNOSIS — F41.1 GAD (GENERALIZED ANXIETY DISORDER): Chronic | ICD-10-CM

## 2024-08-22 RX ORDER — BUSPIRONE HYDROCHLORIDE 15 MG/1
15 TABLET ORAL 2 TIMES DAILY
Qty: 180 TABLET | Refills: 0
Start: 2024-08-22 | End: 2025-02-18

## 2024-08-22 RX ORDER — ONDANSETRON 4 MG/1
4 TABLET, ORALLY DISINTEGRATING ORAL 3 TIMES DAILY PRN
Qty: 21 TABLET | Refills: 0 | Status: SHIPPED | OUTPATIENT
Start: 2024-08-22

## 2024-08-22 RX ORDER — OMEPRAZOLE 40 MG/1
40 CAPSULE, DELAYED RELEASE ORAL
Qty: 30 CAPSULE | Refills: 0 | Status: SHIPPED | OUTPATIENT
Start: 2024-08-22

## 2024-08-22 NOTE — PROGRESS NOTES
[facility]  [address]  [facility] [address]  Dept: [phone]  Loc: [phone]     SUBJECTIVE         Pt presents for PRE-OP PE for planned neck surgery.  Surgery is scheduled with Dr. Butler on8/28 .  General  anesthesia is planned.  Current symptoms include neck pain, arm weakness and pain .   Previous therapy tried includes: previous surgery, exercises.     CURRENT EXERCISE REGIMEN:  Mets > 4 (light housework, climbing a flight of stairs) : yes    More family members and friends dying.  Trying to keep busy.   Deep breathing helps.  Covid19 raspiness and tiredness.    GERD -- flared with some stress  EKG 1/8/2024   Echo 11/2013.  BMP, CBC, MRSA by PCR.    PRIOR ANESTHESIA PROBLEMS? no    Current medical problems include   Patient Active Problem List   Diagnosis    Abdominal adhesions    Cervical radiculopathy at C5    Tension headache    CAMILLE (generalized anxiety disorder)    Fibromyalgia    Primary osteoarthritis involving multiple joints    Family history of colon cancer    Primary insomnia    Forearm mass, left    Recurrent abdominal pain    Other irritable bowel syndrome    Hypercholesteremia    Pain of left hip joint    Degeneration of lumbosacral intervertebral disc    Primary osteoarthritis of right hip    Osteoarthritis of right hip, unspecified osteoarthritis type    Arthritis of facet joint of cervical spine       Past Medical History:   Diagnosis Date    Abdominal adhesions 02/16/2015    Anxiety     Arthralgia     Arthritis     Cervical radiculopathy at C5 02/16/2015    Chronic abdominal pain     Diverticulitis     Fibromyalgia 08/21/2015    Hypercholesteremia 03/02/2022    IBS (irritable bowel syndrome)     Irritable bowel disease     Other irritable bowel syndrome 03/02/2022    Primary osteoarthritis involving multiple joints 01/04/2019    Tension headache 02/16/2015       Past Surgical History:   Procedure Laterality

## 2024-08-23 ASSESSMENT — ENCOUNTER SYMPTOMS: SHORTNESS OF BREATH: 0

## 2024-08-28 DIAGNOSIS — M79.7 FIBROMYALGIA: Chronic | ICD-10-CM

## 2024-08-28 DIAGNOSIS — F41.1 GAD (GENERALIZED ANXIETY DISORDER): Chronic | ICD-10-CM

## 2024-08-28 DIAGNOSIS — F51.01 PRIMARY INSOMNIA: ICD-10-CM

## 2024-08-28 DIAGNOSIS — R42 VERTIGO: ICD-10-CM

## 2024-08-28 RX ORDER — DIAZEPAM 5 MG
5 TABLET ORAL NIGHTLY PRN
Qty: 30 TABLET | Refills: 2 | Status: SHIPPED | OUTPATIENT
Start: 2024-08-28 | End: 2024-11-26

## 2024-08-28 NOTE — TELEPHONE ENCOUNTER
Dominique Pink called requesting a refill on the following medications:  Requested Prescriptions     Pending Prescriptions Disp Refills    diazePAM (VALIUM) 5 MG tablet 30 tablet 2     Sig: Take 1 tablet by mouth nightly as needed for Anxiety for up to 90 days. Max Daily Amount: 5 mg       Date of last visit: 8/22/2024  Date of next visit (if applicable):11/14/2024  Date of last refill: 5/9/24  Pharmacy Name: [facility],  Betzaida Odonnell LPN     [id number]

## 2024-08-30 NOTE — PROGRESS NOTES
Preliminary Discharge Planning Questionnaire  Date of Surgery 09/04/2024     Surgeon AMIRA      Having the proper help and care after surgery is very important to your recovery. Who will be able to help you at home when you are discharged from the hospital?    significant other    Name(s) ANTHONY -FIANCE    How many steps to enter your home?  4     Bathroom on first floor?  Yes    Bedroom on the first floor?  Yes    Do you have an elevated toilet seat to use at home?  N/A    Do you have a walker to use at home?    Total Joints - with wheels N/A   Spine - with wheels  N/A     Have you been doing home exercises?N/A    *You will go home with some outpatient physical therapy, where do you prefer to go? [facility]    This typically will not start until after your post visit to your Dr. (3-4 weeks after surgery)    * What home health agency would you like to use? [facility]      List of all Home Health Agencies Available given to patient, with website ( per Social Work Team)      Please have 2 preferences when you come for surgery- 1st and 2nd choice                                                               *Cardiac Rehab plans N/A

## 2024-08-30 NOTE — PROGRESS NOTES
Follow all instructions given by your physician  NPO after midnight  Sips of water am of surgery with allowed medications  Bring insurance info and 's license  Wear clean comfortable , loose-fitting clothing  No jewelry or contact lenses to be worn day of surgery  No glue on dentures morning of surgery; you will be asked to remove them for surgery.If you have hearing aid please bring container to put them in.  Case for glasses.  Shower the night before and the morning of surgery with a liquid antibacterial soap, dry with new fresh clean towel after each shower, no lotions, creams or powder.  Clean sheets and pillowcase on bed night before surgery  Bring medications in original bottles  Bring CPAP/BIPAP machine if you have one ( you may be charged if one is needed in recovery room )N/A Do you have pacemaker N/A    Our pharmacy has a Meds to Beds program where they will deliver any new prescriptions you may have to your room before you leave. Our Pharmacy will clear it through your insurance; for example (same co pay). This enables you to take your new RX as soon as you need when you get home and avoids stop/wait delays on the way home.  Please have a form of payment with you and have someone designated as your Pharmacy contact with their phone # as you may not feel well or still be under the influence of anesthesia.    Please refer to the SSI-Surgical Site Infection Flyer you hopefully received in the mail-together we can prevent infections; signs and symptoms reviewed.  When discharged be sure you understand how to care for your wound and that you have the Dr./office phone # to call if you have any concerns or questions about your wound.     needed at discharge and someone over 18 to stay with you for 24 hours overnight (surgery may be cancelled if you don't have this)TO BE ADMITTED ANTHONY ALVAREZZECHARIAH WILL BRING HOME AT DISCHARGE  Report to [facility] [place] on 2nd floor  If you would become ill prior to surgery, please call

## 2024-09-04 ENCOUNTER — ANESTHESIA EVENT (OUTPATIENT)
Dept: OPERATING ROOM | Age: 69
End: 2024-09-04
Payer: MEDICARE

## 2024-09-04 ENCOUNTER — APPOINTMENT (OUTPATIENT)
Dept: GENERAL RADIOLOGY | Age: 69
End: 2024-09-04
Attending: ORTHOPAEDIC SURGERY
Payer: MEDICARE

## 2024-09-04 ENCOUNTER — HOSPITAL ENCOUNTER (INPATIENT)
Age: 69
LOS: 2 days | Discharge: HOME HEALTH | End: 2024-09-06
Attending: ORTHOPAEDIC SURGERY | Admitting: ORTHOPAEDIC SURGERY
Payer: MEDICARE

## 2024-09-04 ENCOUNTER — ANESTHESIA (OUTPATIENT)
Dept: OPERATING ROOM | Age: 69
End: 2024-09-04
Payer: MEDICARE

## 2024-09-04 DIAGNOSIS — Z98.1 STATUS POST CERVICAL SPINAL FUSION: Primary | ICD-10-CM

## 2024-09-04 DIAGNOSIS — M79.7 FIBROMYALGIA: Chronic | ICD-10-CM

## 2024-09-04 PROBLEM — M48.02 SPINAL STENOSIS OF CERVICAL REGION WITH RADICULOPATHY: Status: ACTIVE | Noted: 2024-09-04

## 2024-09-04 PROBLEM — M54.12 SPINAL STENOSIS OF CERVICAL REGION WITH RADICULOPATHY: Status: ACTIVE | Noted: 2024-09-04

## 2024-09-04 LAB
ABO: NORMAL
ANTIBODY SCREEN: NORMAL
RH FACTOR: NORMAL

## 2024-09-04 PROCEDURE — C1713 ANCHOR/SCREW BN/BN,TIS/BN: HCPCS | Performed by: ORTHOPAEDIC SURGERY

## 2024-09-04 PROCEDURE — 7100000010 HC PHASE II RECOVERY - FIRST 15 MIN: Performed by: ORTHOPAEDIC SURGERY

## 2024-09-04 PROCEDURE — 0RT30ZZ RESECTION OF CERVICAL VERTEBRAL DISC, OPEN APPROACH: ICD-10-PCS | Performed by: ORTHOPAEDIC SURGERY

## 2024-09-04 PROCEDURE — 72020 X-RAY EXAM OF SPINE 1 VIEW: CPT

## 2024-09-04 PROCEDURE — 1200000000 HC SEMI PRIVATE

## 2024-09-04 PROCEDURE — 3700000001 HC ADD 15 MINUTES (ANESTHESIA): Performed by: ORTHOPAEDIC SURGERY

## 2024-09-04 PROCEDURE — 2500000003 HC RX 250 WO HCPCS: Performed by: ORTHOPAEDIC SURGERY

## 2024-09-04 PROCEDURE — 7100000000 HC PACU RECOVERY - FIRST 15 MIN: Performed by: ORTHOPAEDIC SURGERY

## 2024-09-04 PROCEDURE — 7100000001 HC PACU RECOVERY - ADDTL 15 MIN: Performed by: ORTHOPAEDIC SURGERY

## 2024-09-04 PROCEDURE — 86901 BLOOD TYPING SEROLOGIC RH(D): CPT

## 2024-09-04 PROCEDURE — 6360000002 HC RX W HCPCS: Performed by: NURSE ANESTHETIST, CERTIFIED REGISTERED

## 2024-09-04 PROCEDURE — 6370000000 HC RX 637 (ALT 250 FOR IP): Performed by: PHYSICIAN ASSISTANT

## 2024-09-04 PROCEDURE — 0PP304Z REMOVAL OF INTERNAL FIXATION DEVICE FROM CERVICAL VERTEBRA, OPEN APPROACH: ICD-10-PCS | Performed by: ORTHOPAEDIC SURGERY

## 2024-09-04 PROCEDURE — 3600000014 HC SURGERY LEVEL 4 ADDTL 15MIN: Performed by: ORTHOPAEDIC SURGERY

## 2024-09-04 PROCEDURE — 00NW0ZZ RELEASE CERVICAL SPINAL CORD, OPEN APPROACH: ICD-10-PCS | Performed by: ORTHOPAEDIC SURGERY

## 2024-09-04 PROCEDURE — 3600000004 HC SURGERY LEVEL 4 BASE: Performed by: ORTHOPAEDIC SURGERY

## 2024-09-04 PROCEDURE — 0RG10A0 FUSION OF CERVICAL VERTEBRAL JOINT WITH INTERBODY FUSION DEVICE, ANTERIOR APPROACH, ANTERIOR COLUMN, OPEN APPROACH: ICD-10-PCS | Performed by: ORTHOPAEDIC SURGERY

## 2024-09-04 PROCEDURE — 7100000011 HC PHASE II RECOVERY - ADDTL 15 MIN: Performed by: ORTHOPAEDIC SURGERY

## 2024-09-04 PROCEDURE — 2580000003 HC RX 258: Performed by: PHYSICIAN ASSISTANT

## 2024-09-04 PROCEDURE — 3700000000 HC ANESTHESIA ATTENDED CARE: Performed by: ORTHOPAEDIC SURGERY

## 2024-09-04 PROCEDURE — 01N10ZZ RELEASE CERVICAL NERVE, OPEN APPROACH: ICD-10-PCS | Performed by: ORTHOPAEDIC SURGERY

## 2024-09-04 PROCEDURE — 6360000002 HC RX W HCPCS: Performed by: PHYSICIAN ASSISTANT

## 2024-09-04 PROCEDURE — 6360000002 HC RX W HCPCS: Performed by: STUDENT IN AN ORGANIZED HEALTH CARE EDUCATION/TRAINING PROGRAM

## 2024-09-04 PROCEDURE — 36415 COLL VENOUS BLD VENIPUNCTURE: CPT

## 2024-09-04 PROCEDURE — 2500000003 HC RX 250 WO HCPCS: Performed by: NURSE ANESTHETIST, CERTIFIED REGISTERED

## 2024-09-04 PROCEDURE — 2709999900 HC NON-CHARGEABLE SUPPLY: Performed by: ORTHOPAEDIC SURGERY

## 2024-09-04 PROCEDURE — L0120 CERV FLEX N/ADJ FOAM PRE OTS: HCPCS | Performed by: ORTHOPAEDIC SURGERY

## 2024-09-04 PROCEDURE — 86900 BLOOD TYPING SEROLOGIC ABO: CPT

## 2024-09-04 PROCEDURE — 2720000010 HC SURG SUPPLY STERILE: Performed by: ORTHOPAEDIC SURGERY

## 2024-09-04 PROCEDURE — 86850 RBC ANTIBODY SCREEN: CPT

## 2024-09-04 PROCEDURE — 6360000002 HC RX W HCPCS

## 2024-09-04 DEVICE — ALLOGRAFT BNE SPACER SM 0 14X12X6 MM PARL CORTICAL ELEMAX: Type: IMPLANTABLE DEVICE | Site: NECK | Status: FUNCTIONAL

## 2024-09-04 DEVICE — 4.0MM VARIABLE ANGLE SCREW, SELF-TAPPING, 14MM
Type: IMPLANTABLE DEVICE | Status: FUNCTIONAL
Brand: ADMIRAL

## 2024-09-04 DEVICE — GRAFT BNE 2 CC DBM FIBREX: Type: IMPLANTABLE DEVICE | Site: NECK | Status: FUNCTIONAL

## 2024-09-04 DEVICE — GRAFT BNE MED: Type: IMPLANTABLE DEVICE | Site: NECK | Status: FUNCTIONAL

## 2024-09-04 DEVICE — CERVICAL PLATE, 1 LEVEL, 11MM
Type: IMPLANTABLE DEVICE | Status: FUNCTIONAL
Brand: ADMIRAL

## 2024-09-04 RX ORDER — LIDOCAINE HYDROCHLORIDE AND EPINEPHRINE 10; 10 MG/ML; UG/ML
INJECTION, SOLUTION INFILTRATION; PERINEURAL PRN
Status: DISCONTINUED | OUTPATIENT
Start: 2024-09-04 | End: 2024-09-04 | Stop reason: ALTCHOICE

## 2024-09-04 RX ORDER — ONDANSETRON 2 MG/ML
INJECTION INTRAMUSCULAR; INTRAVENOUS PRN
Status: DISCONTINUED | OUTPATIENT
Start: 2024-09-04 | End: 2024-09-04 | Stop reason: SDUPTHER

## 2024-09-04 RX ORDER — PROPOFOL 10 MG/ML
INJECTION, EMULSION INTRAVENOUS PRN
Status: DISCONTINUED | OUTPATIENT
Start: 2024-09-04 | End: 2024-09-04 | Stop reason: SDUPTHER

## 2024-09-04 RX ORDER — SODIUM CHLORIDE 0.9 % (FLUSH) 0.9 %
5-40 SYRINGE (ML) INJECTION EVERY 12 HOURS SCHEDULED
Status: DISCONTINUED | OUTPATIENT
Start: 2024-09-04 | End: 2024-09-06 | Stop reason: HOSPADM

## 2024-09-04 RX ORDER — HYDROMORPHONE HYDROCHLORIDE 2 MG/ML
INJECTION, SOLUTION INTRAMUSCULAR; INTRAVENOUS; SUBCUTANEOUS PRN
Status: DISCONTINUED | OUTPATIENT
Start: 2024-09-04 | End: 2024-09-04 | Stop reason: SDUPTHER

## 2024-09-04 RX ORDER — OXYCODONE HYDROCHLORIDE 5 MG/1
5 TABLET ORAL EVERY 4 HOURS PRN
Status: DISCONTINUED | OUTPATIENT
Start: 2024-09-04 | End: 2024-09-06 | Stop reason: HOSPADM

## 2024-09-04 RX ORDER — ACETAMINOPHEN 325 MG/1
650 TABLET ORAL EVERY 6 HOURS
Status: DISCONTINUED | OUTPATIENT
Start: 2024-09-04 | End: 2024-09-06 | Stop reason: HOSPADM

## 2024-09-04 RX ORDER — ONDANSETRON 2 MG/ML
4 INJECTION INTRAMUSCULAR; INTRAVENOUS EVERY 6 HOURS PRN
Status: DISCONTINUED | OUTPATIENT
Start: 2024-09-04 | End: 2024-09-06 | Stop reason: HOSPADM

## 2024-09-04 RX ORDER — DROPERIDOL 2.5 MG/ML
0.62 INJECTION, SOLUTION INTRAMUSCULAR; INTRAVENOUS EVERY 6 HOURS PRN
Status: DISCONTINUED | OUTPATIENT
Start: 2024-09-04 | End: 2024-09-06 | Stop reason: HOSPADM

## 2024-09-04 RX ORDER — OXYCODONE HYDROCHLORIDE 5 MG/1
10 TABLET ORAL EVERY 4 HOURS PRN
Status: DISCONTINUED | OUTPATIENT
Start: 2024-09-04 | End: 2024-09-06 | Stop reason: HOSPADM

## 2024-09-04 RX ORDER — FENTANYL CITRATE 50 UG/ML
INJECTION, SOLUTION INTRAMUSCULAR; INTRAVENOUS PRN
Status: DISCONTINUED | OUTPATIENT
Start: 2024-09-04 | End: 2024-09-04 | Stop reason: SDUPTHER

## 2024-09-04 RX ORDER — SODIUM CHLORIDE 9 MG/ML
INJECTION, SOLUTION INTRAVENOUS PRN
Status: DISCONTINUED | OUTPATIENT
Start: 2024-09-04 | End: 2024-09-04 | Stop reason: HOSPADM

## 2024-09-04 RX ORDER — DEXAMETHASONE SODIUM PHOSPHATE 4 MG/ML
8 INJECTION, SOLUTION INTRA-ARTICULAR; INTRALESIONAL; INTRAMUSCULAR; INTRAVENOUS; SOFT TISSUE EVERY 8 HOURS
Status: DISCONTINUED | OUTPATIENT
Start: 2024-09-04 | End: 2024-09-04 | Stop reason: SDUPTHER

## 2024-09-04 RX ORDER — DICYCLOMINE HYDROCHLORIDE 10 MG/1
10 CAPSULE ORAL 3 TIMES DAILY PRN
Status: DISCONTINUED | OUTPATIENT
Start: 2024-09-04 | End: 2024-09-06 | Stop reason: HOSPADM

## 2024-09-04 RX ORDER — NALOXONE HYDROCHLORIDE 0.4 MG/ML
INJECTION, SOLUTION INTRAMUSCULAR; INTRAVENOUS; SUBCUTANEOUS PRN
Status: DISCONTINUED | OUTPATIENT
Start: 2024-09-04 | End: 2024-09-04 | Stop reason: HOSPADM

## 2024-09-04 RX ORDER — DIAZEPAM 5 MG
5 TABLET ORAL NIGHTLY PRN
Status: DISCONTINUED | OUTPATIENT
Start: 2024-09-04 | End: 2024-09-06 | Stop reason: HOSPADM

## 2024-09-04 RX ORDER — BUSPIRONE HYDROCHLORIDE 7.5 MG/1
15 TABLET ORAL 2 TIMES DAILY
Status: DISCONTINUED | OUTPATIENT
Start: 2024-09-04 | End: 2024-09-06 | Stop reason: HOSPADM

## 2024-09-04 RX ORDER — DIPHENHYDRAMINE HYDROCHLORIDE 50 MG/ML
12.5 INJECTION INTRAMUSCULAR; INTRAVENOUS
Status: DISCONTINUED | OUTPATIENT
Start: 2024-09-04 | End: 2024-09-04 | Stop reason: HOSPADM

## 2024-09-04 RX ORDER — SODIUM CHLORIDE 0.9 % (FLUSH) 0.9 %
5-40 SYRINGE (ML) INJECTION PRN
Status: DISCONTINUED | OUTPATIENT
Start: 2024-09-04 | End: 2024-09-04 | Stop reason: SDUPTHER

## 2024-09-04 RX ORDER — TRANEXAMIC ACID 100 MG/ML
INJECTION, SOLUTION INTRAVENOUS PRN
Status: DISCONTINUED | OUTPATIENT
Start: 2024-09-04 | End: 2024-09-04 | Stop reason: SDUPTHER

## 2024-09-04 RX ORDER — ONDANSETRON 4 MG/1
4 TABLET, ORALLY DISINTEGRATING ORAL EVERY 8 HOURS PRN
Status: DISCONTINUED | OUTPATIENT
Start: 2024-09-04 | End: 2024-09-06 | Stop reason: HOSPADM

## 2024-09-04 RX ORDER — DEXAMETHASONE SODIUM PHOSPHATE 4 MG/ML
8 INJECTION, SOLUTION INTRA-ARTICULAR; INTRALESIONAL; INTRAMUSCULAR; INTRAVENOUS; SOFT TISSUE EVERY 8 HOURS
Status: COMPLETED | OUTPATIENT
Start: 2024-09-04 | End: 2024-09-05

## 2024-09-04 RX ORDER — DEXAMETHASONE SODIUM PHOSPHATE 10 MG/ML
INJECTION, EMULSION INTRAMUSCULAR; INTRAVENOUS PRN
Status: DISCONTINUED | OUTPATIENT
Start: 2024-09-04 | End: 2024-09-04 | Stop reason: SDUPTHER

## 2024-09-04 RX ORDER — CYCLOBENZAPRINE HCL 10 MG
10 TABLET ORAL 3 TIMES DAILY PRN
Status: DISCONTINUED | OUTPATIENT
Start: 2024-09-04 | End: 2024-09-05

## 2024-09-04 RX ORDER — SODIUM CHLORIDE 0.9 % (FLUSH) 0.9 %
5-40 SYRINGE (ML) INJECTION EVERY 12 HOURS SCHEDULED
Status: DISCONTINUED | OUTPATIENT
Start: 2024-09-04 | End: 2024-09-04 | Stop reason: HOSPADM

## 2024-09-04 RX ORDER — SODIUM CHLORIDE 0.9 % (FLUSH) 0.9 %
5-40 SYRINGE (ML) INJECTION PRN
Status: DISCONTINUED | OUTPATIENT
Start: 2024-09-04 | End: 2024-09-04 | Stop reason: HOSPADM

## 2024-09-04 RX ORDER — FENTANYL CITRATE 50 UG/ML
50 INJECTION, SOLUTION INTRAMUSCULAR; INTRAVENOUS EVERY 5 MIN PRN
Status: DISCONTINUED | OUTPATIENT
Start: 2024-09-04 | End: 2024-09-04 | Stop reason: HOSPADM

## 2024-09-04 RX ORDER — SODIUM CHLORIDE 9 MG/ML
INJECTION, SOLUTION INTRAVENOUS PRN
Status: DISCONTINUED | OUTPATIENT
Start: 2024-09-04 | End: 2024-09-06 | Stop reason: HOSPADM

## 2024-09-04 RX ORDER — LIDOCAINE HCL/PF 100 MG/5ML
SYRINGE (ML) INJECTION PRN
Status: DISCONTINUED | OUTPATIENT
Start: 2024-09-04 | End: 2024-09-04 | Stop reason: SDUPTHER

## 2024-09-04 RX ORDER — ROCURONIUM BROMIDE 10 MG/ML
INJECTION, SOLUTION INTRAVENOUS PRN
Status: DISCONTINUED | OUTPATIENT
Start: 2024-09-04 | End: 2024-09-04 | Stop reason: SDUPTHER

## 2024-09-04 RX ORDER — SODIUM CHLORIDE 9 MG/ML
INJECTION, SOLUTION INTRAVENOUS CONTINUOUS
Status: DISCONTINUED | OUTPATIENT
Start: 2024-09-04 | End: 2024-09-04 | Stop reason: HOSPADM

## 2024-09-04 RX ORDER — ONDANSETRON 2 MG/ML
4 INJECTION INTRAMUSCULAR; INTRAVENOUS
Status: DISCONTINUED | OUTPATIENT
Start: 2024-09-04 | End: 2024-09-04 | Stop reason: HOSPADM

## 2024-09-04 RX ORDER — SODIUM CHLORIDE 0.9 % (FLUSH) 0.9 %
5-40 SYRINGE (ML) INJECTION PRN
Status: DISCONTINUED | OUTPATIENT
Start: 2024-09-04 | End: 2024-09-06 | Stop reason: HOSPADM

## 2024-09-04 RX ORDER — SODIUM CHLORIDE 0.9 % (FLUSH) 0.9 %
5-40 SYRINGE (ML) INJECTION EVERY 12 HOURS SCHEDULED
Status: DISCONTINUED | OUTPATIENT
Start: 2024-09-04 | End: 2024-09-04 | Stop reason: SDUPTHER

## 2024-09-04 RX ORDER — PANTOPRAZOLE SODIUM 40 MG/1
40 TABLET, DELAYED RELEASE ORAL
Status: DISCONTINUED | OUTPATIENT
Start: 2024-09-05 | End: 2024-09-06 | Stop reason: HOSPADM

## 2024-09-04 RX ORDER — POLYETHYLENE GLYCOL 3350 17 G/17G
17 POWDER, FOR SOLUTION ORAL DAILY
Status: DISCONTINUED | OUTPATIENT
Start: 2024-09-04 | End: 2024-09-06 | Stop reason: HOSPADM

## 2024-09-04 RX ORDER — MIDAZOLAM HYDROCHLORIDE 1 MG/ML
INJECTION INTRAMUSCULAR; INTRAVENOUS PRN
Status: DISCONTINUED | OUTPATIENT
Start: 2024-09-04 | End: 2024-09-04 | Stop reason: SDUPTHER

## 2024-09-04 RX ORDER — PHENYLEPHRINE HCL IN 0.9% NACL 1 MG/10 ML
SYRINGE (ML) INTRAVENOUS PRN
Status: DISCONTINUED | OUTPATIENT
Start: 2024-09-04 | End: 2024-09-04 | Stop reason: SDUPTHER

## 2024-09-04 RX ADMIN — WATER 2000 MG: 1 INJECTION INTRAMUSCULAR; INTRAVENOUS; SUBCUTANEOUS at 18:32

## 2024-09-04 RX ADMIN — ONDANSETRON 4 MG: 2 INJECTION INTRAMUSCULAR; INTRAVENOUS at 08:28

## 2024-09-04 RX ADMIN — SODIUM CHLORIDE: 9 INJECTION, SOLUTION INTRAVENOUS at 08:41

## 2024-09-04 RX ADMIN — POLYETHYLENE GLYCOL 3350 17 G: 17 POWDER, FOR SOLUTION ORAL at 18:32

## 2024-09-04 RX ADMIN — Medication 100 MCG: at 07:30

## 2024-09-04 RX ADMIN — OXYCODONE HYDROCHLORIDE 10 MG: 5 TABLET ORAL at 13:20

## 2024-09-04 RX ADMIN — HYDROMORPHONE HYDROCHLORIDE 0.5 MG: 2 INJECTION INTRAMUSCULAR; INTRAVENOUS; SUBCUTANEOUS at 08:06

## 2024-09-04 RX ADMIN — BUSPIRONE HYDROCHLORIDE 15 MG: 7.5 TABLET ORAL at 19:53

## 2024-09-04 RX ADMIN — SODIUM CHLORIDE: 9 INJECTION, SOLUTION INTRAVENOUS at 06:19

## 2024-09-04 RX ADMIN — Medication 100 MG: at 07:18

## 2024-09-04 RX ADMIN — HYDROMORPHONE HYDROCHLORIDE 0.5 MG: 1 INJECTION, SOLUTION INTRAMUSCULAR; INTRAVENOUS; SUBCUTANEOUS at 16:59

## 2024-09-04 RX ADMIN — TRANEXAMIC ACID 1000 MG: 100 INJECTION, SOLUTION INTRAVENOUS at 07:34

## 2024-09-04 RX ADMIN — PROPOFOL 150 MG: 10 INJECTION, EMULSION INTRAVENOUS at 07:18

## 2024-09-04 RX ADMIN — HYDROMORPHONE HYDROCHLORIDE 0.5 MG: 2 INJECTION INTRAMUSCULAR; INTRAVENOUS; SUBCUTANEOUS at 09:01

## 2024-09-04 RX ADMIN — DEXAMETHASONE SODIUM PHOSPHATE 8 MG: 4 INJECTION, SOLUTION INTRA-ARTICULAR; INTRALESIONAL; INTRAMUSCULAR; INTRAVENOUS; SOFT TISSUE at 19:48

## 2024-09-04 RX ADMIN — SODIUM CHLORIDE: 9 INJECTION, SOLUTION INTRAVENOUS at 15:42

## 2024-09-04 RX ADMIN — SODIUM CHLORIDE, PRESERVATIVE FREE 10 ML: 5 INJECTION INTRAVENOUS at 19:54

## 2024-09-04 RX ADMIN — ACETAMINOPHEN 650 MG: 325 TABLET ORAL at 18:32

## 2024-09-04 RX ADMIN — DIAZEPAM 5 MG: 5 TABLET ORAL at 22:24

## 2024-09-04 RX ADMIN — HYDROMORPHONE HYDROCHLORIDE 0.5 MG: 2 INJECTION INTRAMUSCULAR; INTRAVENOUS; SUBCUTANEOUS at 07:56

## 2024-09-04 RX ADMIN — HYDROMORPHONE HYDROCHLORIDE 0.5 MG: 2 INJECTION INTRAMUSCULAR; INTRAVENOUS; SUBCUTANEOUS at 07:52

## 2024-09-04 RX ADMIN — ONDANSETRON 4 MG: 2 INJECTION INTRAMUSCULAR; INTRAVENOUS at 10:04

## 2024-09-04 RX ADMIN — DEXAMETHASONE SODIUM PHOSPHATE 10 MG: 10 INJECTION, EMULSION INTRAMUSCULAR; INTRAVENOUS at 07:18

## 2024-09-04 RX ADMIN — SUGAMMADEX 200 MG: 100 INJECTION, SOLUTION INTRAVENOUS at 08:49

## 2024-09-04 RX ADMIN — PROPOFOL 50 MG: 10 INJECTION, EMULSION INTRAVENOUS at 08:05

## 2024-09-04 RX ADMIN — MIDAZOLAM 2 MG: 1 INJECTION INTRAMUSCULAR; INTRAVENOUS at 07:11

## 2024-09-04 RX ADMIN — WATER 2000 MG: 1 INJECTION INTRAMUSCULAR; INTRAVENOUS; SUBCUTANEOUS at 07:28

## 2024-09-04 RX ADMIN — OXYCODONE HYDROCHLORIDE 10 MG: 5 TABLET ORAL at 22:24

## 2024-09-04 RX ADMIN — ROCURONIUM BROMIDE 50 MG: 10 INJECTION INTRAVENOUS at 07:18

## 2024-09-04 RX ADMIN — CYCLOBENZAPRINE 10 MG: 10 TABLET, FILM COATED ORAL at 13:21

## 2024-09-04 RX ADMIN — DROPERIDOL 0.62 MG: 2.5 INJECTION, SOLUTION INTRAMUSCULAR; INTRAVENOUS at 15:10

## 2024-09-04 RX ADMIN — FENTANYL CITRATE 100 MCG: 50 INJECTION, SOLUTION INTRAMUSCULAR; INTRAVENOUS at 07:18

## 2024-09-04 RX ADMIN — ONDANSETRON 4 MG: 4 TABLET, ORALLY DISINTEGRATING ORAL at 22:25

## 2024-09-04 RX ADMIN — Medication 100 MCG: at 07:43

## 2024-09-04 ASSESSMENT — PAIN DESCRIPTION - ONSET: ONSET: ON-GOING

## 2024-09-04 ASSESSMENT — PAIN - FUNCTIONAL ASSESSMENT
PAIN_FUNCTIONAL_ASSESSMENT: ACTIVITIES ARE NOT PREVENTED
PAIN_FUNCTIONAL_ASSESSMENT: PREVENTS OR INTERFERES SOME ACTIVE ACTIVITIES AND ADLS
PAIN_FUNCTIONAL_ASSESSMENT: 0-10
PAIN_FUNCTIONAL_ASSESSMENT: NONE - DENIES PAIN

## 2024-09-04 ASSESSMENT — PAIN DESCRIPTION - LOCATION
LOCATION: NECK;SHOULDER
LOCATION: NECK;SHOULDER
LOCATION: NECK
LOCATION: NECK

## 2024-09-04 ASSESSMENT — PAIN DESCRIPTION - DESCRIPTORS
DESCRIPTORS: ACHING

## 2024-09-04 ASSESSMENT — PAIN SCALES - GENERAL
PAINLEVEL_OUTOF10: 7
PAINLEVEL_OUTOF10: 2
PAINLEVEL_OUTOF10: 5
PAINLEVEL_OUTOF10: 0

## 2024-09-04 ASSESSMENT — PAIN DESCRIPTION - ORIENTATION: ORIENTATION: MID

## 2024-09-04 ASSESSMENT — PAIN DESCRIPTION - PAIN TYPE: TYPE: SURGICAL PAIN

## 2024-09-04 ASSESSMENT — PAIN DESCRIPTION - FREQUENCY: FREQUENCY: CONTINUOUS

## 2024-09-04 ASSESSMENT — LIFESTYLE VARIABLES: SMOKING_STATUS: 0

## 2024-09-04 NOTE — PROGRESS NOTES
Pt returned to [facility] room 20. Vitals and assessment as charted. 0.9 infusing,  to count from PACU. Pt has crackers and water. Family at the bedside. Pt and family verbalized understanding of awaiting a bed and call light use. Call light in reach.

## 2024-09-04 NOTE — BRIEF OP NOTE
Brief Postoperative Note      Patient: Dominique Pink  YOB: 1955  MRN: [id number]    Date of Procedure: 9/4/2024    Pre-Op Diagnosis Codes:      * Spinal stenosis in cervical region [M48.02]    Post-Op Diagnosis: Same       Procedure(s):  Removal Of Plate C5-C7 and ACDF C4-C5    Surgeon(s):  Dillon Luther MD    Assistant:  Rodriguez Russo PAC    Anesthesia: General    Estimated Blood Loss (mL): less than 50     Complications: None    Specimens:   * No specimens in log *    Implants:  Implant Name Type Inv. Item Serial No.  Lot No. LRB No. Used Action   GRAFT BNE MED - [id number]  GRAFT BNE MED [id number] [employer]OLOGICA  N/A 1 Implanted   GRAFT BNE 2 CC DBM FIBREX - [id number]  GRAFT BNE 2 CC DBM FIBREX  [employer]  N/A 1 Implanted         Drains: * No LDAs found *    Findings:  Infection Present At Time Of Surgery (PATOS) (choose all levels that have infection present):  No infection present  Other Findings: same    Electronically signed by Dillon Luther MD on 9/4/2024 at 8:34 AM

## 2024-09-04 NOTE — PROGRESS NOTES
Report called to [facility] on 7K, pt transported to 7K per transport team.    Universal Safety Interventions

## 2024-09-04 NOTE — H&P
[address] [address]                            PREOPERATIVE H&P      PATIENT NAME: TRAVIS REYES          : 1955  MED REC NO: [id number]                       ROOM: [facility]                                               (General) POOL                                               RM    ACCOUNT NO: [id number]                       ADMIT DATE: 2024  PROVIDER: Placido Delgado PA-C      PRIMARY CARE PHYSICIAN:  Chrissie Espinal MD    This is a patient of Dr. iDllon Luther who will be undergoing surgical intervention at [facility] on the date of 2024 which will include a removal of plate C5-C7 and ACDF C4-C5.    CHIEF COMPLAINT:  Cervical pain and left upper extremity radiculopathy with associated weakness.    HISTORY OF PRESENT ILLNESS:  The patient is a 69-year-old female who presents to our office today with significant symptoms of cervical pain that radiates down her left upper extremity with associated weakness.  She also has complaints of significant difficulties with her balance and loss of hand dexterity.  She is a patient who is known to our service who previously underwent an ACDF C5-C7 by Dr. Luther in 2008.  She was overall doing well until approximately 6 months ago she developed these symptoms.  The symptoms have overall progressively worsened and overall affected her quality of life as well as ability to complete her ADLs.  She has failed to improve despite her conservative treatment including over-the-counter NSAIDs, activity modification, and physical therapy.  With this, she was worked up with an MRI, which demonstrated significant neural compression at C4-C5 adjacent to her previous ACDF surgery.  With her failed conservative treatment, she would like to proceed with surgical intervention and was surgically cleared by her PCP, Dr. Chrissie Espinal, to undergo  touch.  Edie sign is negative bilaterally.  There is a weakness graded 4/5 left-sided shoulder abduction compared to 5/5 on the right.  Otherwise, strength is maintained at 5/5 in bilateral , finger extension, wrist extension, elbow flexion, and elbow extension.    IMAGING:  MRI cervical spine without contrast completed 05/15/2024 demonstrates at C5-C6 a posterior disk osteophyte complex with evidence of ventral cord contact to the cervical cord.  There is also a left-sided vertebral joint degenerative changes with moderate spinal stenosis.    LABORATORY DATA:  Dated 08/12/2014 demonstrates MRSA screen is negative.  Hemoglobin 14.3, hematocrit 43.2.    ASSESSMENT:  Cervical spinal stenosis with radiculopathy and myelopathy.    PLAN:  The plan moving forward with the patient is for her to undergo surgical intervention under care of Dr. Dillon Luther at [facility] on 09/04/2024, which will include removal of plate C5-C7, ACDF C4-C5.    CONCLUSION:  The patient is a 69-year-old female who has been struggling with cervical pain, left upper extremity radiculopathy with associated weakness along with mild symptoms of worsening balance and loss of hand dexterity over the past 6 months without any obvious traumatizing events.  The patient previously underwent ACDF C5-C7 under the care of Dr. Dillon Luther back in 2008 and otherwise did very well.  She failed to improve despite her conservative treatments including physical therapy and pain medications and worked up with an MRI which demonstrated significant nerve compression at the spinal level of C4-C5 from previous C-spine surgery.  With failure of conservative treatment, she elects to proceed with surgical intervention and was surgically cleared by her PCP, Dr. Chrissie Espinal, to undergo surgery.  We received informed consent from the patient.  I have gone over the risks and benefits of surgery which include postoperative pain, blood loss, blood

## 2024-09-04 NOTE — H&P
I have reviewed the history and physical and examined the patient.  I find no relevant changes.  I have reviewed with the patient and/or family members, during the preoperative office visit the risks, benefits, and alternatives to the procedure.    Dillon Luther MD

## 2024-09-04 NOTE — ANESTHESIA POSTPROCEDURE EVALUATION
Department of Anesthesiology  Postprocedure Note    Patient: Dominique Pink  MRN: [id number]  YOB: 1955  Date of evaluation: 9/4/2024    Procedure Summary       Date: 09/04/24 Room / Location: [facility] OR [address] [place] [address] OR    Anesthesia Start: 0713 Anesthesia Stop: 0901    Procedure: Removal Of Plate C5-C7 and ACDF C4-C5 (Neck) Diagnosis:       Spinal stenosis in cervical region      (Spinal stenosis in cervical region [M48.02])    Surgeons: Dillon Luther MD Responsible Provider: Tapan Pedro DO    Anesthesia Type: general ASA Status: 2            Anesthesia Type: No value filed.    Rin Phase I: Rin Score: 10    Rin Phase II: Rin Score: 10    Anesthesia Post Evaluation    Patient location during evaluation: PACU  Patient participation: complete - patient participated  Level of consciousness: awake and alert  Airway patency: patent  Nausea & Vomiting: no vomiting and no nausea  Cardiovascular status: hemodynamically stable  Respiratory status: acceptable and room air  Hydration status: stable  Pain management: adequate    No notable events documented.

## 2024-09-04 NOTE — CARE COORDINATION
1:56 PM  Referral for HH for drain management at discharge called to pt preference: [facility]. Spoke with Prudence and pt accepted.

## 2024-09-04 NOTE — OP NOTE
[place] [id number]                            OPERATIVE REPORT      PATIENT NAME: TRAVIS REYES          : 1955  MED REC NO: [id number]                       ROOM: [facility] OR                                               (General) POOL                                               RM    ACCOUNT NO: [id number]                       ADMIT DATE: 2024  PROVIDER: Dillon Luther MD      DATE OF PROCEDURE:  2024    SURGEON:  Dillon Luther MD    PREOPERATIVE DIAGNOSES:    1. Cervical spondylosis and stenosis of level C4-C5 with a resultant left upper extremity radiculopathy and weakness.  2. Status post previous anterior cervical diskectomy and fusion of level C5 through C7 year  under my care with interbody bone grafting and plating.    POSTOPERATIVE DIAGNOSES:    1. Cervical spondylosis and stenosis of level C4-C5 with a resultant left upper extremity radiculopathy and weakness.  2. Status post previous anterior cervical diskectomy and fusion of level C5 through C7 2008 under my care with interbody bone grafting and plating.    PROCEDURES:    1. Removal of anterior cervical plate levels of C5, C6, and C7.  2. The assessment of fusion of levels of C5-C6 and C6-C7 with both a scoring and manipulation technique with finding of solid bony union.  3. Anterior cervical interbody fusion at level of C4-C5 with complete uncus decompression and clearance of canal stenosis at level of C4-C5 with diskectomy.  4. Anterior cervical interbody fusion at level of C4-C5.  5. Use of allograft bone graft tricortical structure for this interbody fusion by drew Wilder Elevate bone.  A tricortical piece measuring 6 x 14 x 12 mm size for this interbody fusion.  6. Use of allograft bone graft DBM, Xtant DBM bone graft gel filled with the ProteiOS growth factor 2.5 mL for this interbody fusion at level of C4-C5.  7. Plating of cervical  visualization, a complete anterior diskectomy was completed with use of curettage, kirby, and Kerrison rongeur with relief of all disk material upon the anterior aspect of the cervical spinal cord.  The disk was removed and the relief was obtained by lifting the PLL and osteophytes across the anterior aspect of the disk space of the spinal canal or where the disk was located causing neural compression.  Upon relief of this neurocompressive bone and disk, the micro ball-tip probe was then passed well through each of the opening foramina upon lifting of the PLL.  This relieved all neural compression bilaterally with a complete diskectomy and the endplates were also well decorticated with use of a bur.  We chose a 6 x 14 x 12 mm bone by Tina, the Elevate bone.  We filled this with use of DBM and growth factor mixture which we tamped into place at level of C4-C5 for fusion.  This fit very well, and we went on to remove the [place] pins and placed thrombin and wax at the pin sites before plating levels of C4-C5 with use of the 11 mm SeaSpine Admiral plate.  The plate fit very well.  Four bone screws were used each measuring 14 mm in length, and we went on to ensure no soft tissue entrapment or bleeding.  The plating fit very well and x-rays were taken.  This showed well placed plating and fixation across level of C4-C5.  The irrigation was complete and a LESLEY drain was then placed coming out through a separate stab incision.  We sew this drain tube in after review of the x-rays and closed in layers.    Dressings were applied with an OpSite bandage and hemostasis was well maintained.  All counts were correct.  The plating fit very well.  With all counts correct and layered closure was complete and the dressings applied with an Aspen collar, we then went on to awaken the patient and take her back to recovery postextubation without complication or difficulty.  She has done well throughout the operation, and there were no

## 2024-09-04 NOTE — PROGRESS NOTES
0859 - Pt arrives to pacu at this time. Respirs unlabored on room air. Pt alert, denies pain. VSS. Pt appears in no acute distress.     0910 - Pt continues to deny complaints. Resting in bed with eyes closed. VSS    0920 - Pt resting with eyes closed. VSS. Appears in no acute distress.     0929 - Pt meets criteria to discharge from PACU at this time. VSS. Denies pain. Transferred to \[facility]\"" in stable condition.

## 2024-09-04 NOTE — PROGRESS NOTES
Pt admitted to [facility] room 20 and oriented to unit. SCD sleeves applied. Nares swabbed. Pt verbalized permission for first name, last initial and physicians name on white board. SDS board and discharge criteria explained, pt and family verbalized understanding. Pt denies thoughts of harming self or others. Call light in reach. Family at the bedside.

## 2024-09-04 NOTE — ANESTHESIA PRE PROCEDURE
• Osteoarthritis of right hip, unspecified osteoarthritis type M16.11   • Arthritis of facet joint of cervical spine M47.812   • Spinal stenosis of cervical region with radiculopathy M48.02, M54.12       Past Medical History:        Diagnosis Date   • Abdominal adhesions 2015   • Anxiety    • Arthralgia    • Arthritis    • Cervical radiculopathy at C5 2015   • Chronic abdominal pain    • Diverticulitis    • Fibromyalgia 2015   • Hypercholesteremia 2022   • IBS (irritable bowel syndrome)    • Irritable bowel disease    • Other irritable bowel syndrome 2022   • Primary osteoarthritis involving multiple joints 2019   • Tension headache 2015       Past Surgical History:        Procedure Laterality Date   • ABDOMINAL EXPLORATION SURGERY     • APPENDECTOMY     • BREAST BIOPSY Right 2016    Excisional - Dr. Lundy   • CERVICAL FUSION  oct 2008    anterior with plate   • CHOLECYSTECTOMY  3/4/08    Dr. Lundy   • COLECTOMY  2013    Dr. Lundy   • COLONOSCOPY  11/15/11    Dr Lundy   • COLONOSCOPY Left 2020    COLONOSCOPY POLYPECTOMY SNARE/COLD BIOPSY performed by Cuba Lundy MD at [facility]   • HIP FRACTURE SURGERY Left 2018    Trauma Femur   • ARTIE STEROTACTIC LOC BREAST BIOPSY RIGHT Right 2016    benign   • OTHER SURGICAL HISTORY  13    Sigmoid colon resection - Dr. Lundy   • OVARY REMOVAL      left   • TONSILLECTOMY      as a child   • TOTAL HIP ARTHROPLASTY Right 2024    Right Total Hip Anterior Approach performed by Placido Ivy MD at [facility] OR   • UPPER GASTROINTESTINAL ENDOSCOPY  11/15/11    Dr Lundy       Social History:    Social History     Tobacco Use   • Smoking status: Former     Current packs/day: 0.00     Average packs/day: 0.5 packs/day for 15.0 years (7.5 ttl pk-yrs)     Types: Cigarettes     Start date: 1997     Quit date: 2012     Years since quittin.7   • Smokeless

## 2024-09-05 PROCEDURE — 6360000002 HC RX W HCPCS

## 2024-09-05 PROCEDURE — 6360000002 HC RX W HCPCS: Performed by: PHYSICIAN ASSISTANT

## 2024-09-05 PROCEDURE — 97166 OT EVAL MOD COMPLEX 45 MIN: CPT

## 2024-09-05 PROCEDURE — 97116 GAIT TRAINING THERAPY: CPT

## 2024-09-05 PROCEDURE — 6370000000 HC RX 637 (ALT 250 FOR IP): Performed by: PHYSICIAN ASSISTANT

## 2024-09-05 PROCEDURE — 1200000000 HC SEMI PRIVATE

## 2024-09-05 PROCEDURE — 2580000003 HC RX 258: Performed by: PHYSICIAN ASSISTANT

## 2024-09-05 PROCEDURE — 97535 SELF CARE MNGMENT TRAINING: CPT

## 2024-09-05 PROCEDURE — 97162 PT EVAL MOD COMPLEX 30 MIN: CPT

## 2024-09-05 PROCEDURE — 97530 THERAPEUTIC ACTIVITIES: CPT

## 2024-09-05 RX ORDER — BACLOFEN 10 MG/1
20 TABLET ORAL 3 TIMES DAILY
Status: DISCONTINUED | OUTPATIENT
Start: 2024-09-05 | End: 2024-09-06 | Stop reason: HOSPADM

## 2024-09-05 RX ORDER — BACLOFEN 20 MG/1
20 TABLET ORAL 3 TIMES DAILY PRN
Qty: 90 TABLET | Refills: 2 | Status: SHIPPED | OUTPATIENT
Start: 2024-09-05

## 2024-09-05 RX ORDER — OXYCODONE HYDROCHLORIDE 5 MG/1
5 TABLET ORAL EVERY 4 HOURS PRN
Qty: 42 TABLET | Refills: 0 | Status: SHIPPED | OUTPATIENT
Start: 2024-09-05 | End: 2024-09-12

## 2024-09-05 RX ADMIN — BACLOFEN 20 MG: 10 TABLET ORAL at 08:33

## 2024-09-05 RX ADMIN — BUSPIRONE HYDROCHLORIDE 15 MG: 7.5 TABLET ORAL at 21:14

## 2024-09-05 RX ADMIN — BUSPIRONE HYDROCHLORIDE 15 MG: 7.5 TABLET ORAL at 08:29

## 2024-09-05 RX ADMIN — BACLOFEN 20 MG: 10 TABLET ORAL at 13:15

## 2024-09-05 RX ADMIN — SODIUM CHLORIDE, PRESERVATIVE FREE 10 ML: 5 INJECTION INTRAVENOUS at 21:26

## 2024-09-05 RX ADMIN — POLYETHYLENE GLYCOL 3350 17 G: 17 POWDER, FOR SOLUTION ORAL at 08:33

## 2024-09-05 RX ADMIN — OXYCODONE HYDROCHLORIDE 5 MG: 5 TABLET ORAL at 17:03

## 2024-09-05 RX ADMIN — DEXAMETHASONE SODIUM PHOSPHATE 8 MG: 4 INJECTION, SOLUTION INTRA-ARTICULAR; INTRALESIONAL; INTRAMUSCULAR; INTRAVENOUS; SOFT TISSUE at 03:46

## 2024-09-05 RX ADMIN — ACETAMINOPHEN 650 MG: 325 TABLET ORAL at 06:25

## 2024-09-05 RX ADMIN — OXYCODONE HYDROCHLORIDE 5 MG: 5 TABLET ORAL at 06:29

## 2024-09-05 RX ADMIN — ACETAMINOPHEN 650 MG: 325 TABLET ORAL at 00:42

## 2024-09-05 RX ADMIN — PANTOPRAZOLE SODIUM 40 MG: 40 TABLET, DELAYED RELEASE ORAL at 06:25

## 2024-09-05 RX ADMIN — SODIUM CHLORIDE, PRESERVATIVE FREE 10 ML: 5 INJECTION INTRAVENOUS at 08:30

## 2024-09-05 RX ADMIN — OXYCODONE HYDROCHLORIDE 5 MG: 5 TABLET ORAL at 21:24

## 2024-09-05 RX ADMIN — WATER 2000 MG: 1 INJECTION INTRAMUSCULAR; INTRAVENOUS; SUBCUTANEOUS at 03:46

## 2024-09-05 RX ADMIN — DEXAMETHASONE SODIUM PHOSPHATE 8 MG: 4 INJECTION, SOLUTION INTRA-ARTICULAR; INTRALESIONAL; INTRAMUSCULAR; INTRAVENOUS; SOFT TISSUE at 11:45

## 2024-09-05 RX ADMIN — BACLOFEN 20 MG: 10 TABLET ORAL at 21:25

## 2024-09-05 ASSESSMENT — PAIN DESCRIPTION - ORIENTATION
ORIENTATION: LEFT

## 2024-09-05 ASSESSMENT — PAIN DESCRIPTION - DESCRIPTORS
DESCRIPTORS: ACHING

## 2024-09-05 ASSESSMENT — PAIN DESCRIPTION - FREQUENCY
FREQUENCY: CONTINUOUS

## 2024-09-05 ASSESSMENT — PAIN DESCRIPTION - ONSET
ONSET: ON-GOING

## 2024-09-05 ASSESSMENT — PAIN DESCRIPTION - PAIN TYPE
TYPE: SURGICAL PAIN

## 2024-09-05 ASSESSMENT — PAIN DESCRIPTION - LOCATION
LOCATION: NECK
LOCATION: NECK
LOCATION: NECK;SHOULDER
LOCATION: NECK
LOCATION: NECK;SHOULDER

## 2024-09-05 ASSESSMENT — PAIN SCALES - GENERAL
PAINLEVEL_OUTOF10: 3
PAINLEVEL_OUTOF10: 3
PAINLEVEL_OUTOF10: 6
PAINLEVEL_OUTOF10: 2
PAINLEVEL_OUTOF10: 3
PAINLEVEL_OUTOF10: 3
PAINLEVEL_OUTOF10: 2
PAINLEVEL_OUTOF10: 4
PAINLEVEL_OUTOF10: 2
PAINLEVEL_OUTOF10: 7
PAINLEVEL_OUTOF10: 2

## 2024-09-05 ASSESSMENT — PAIN - FUNCTIONAL ASSESSMENT
PAIN_FUNCTIONAL_ASSESSMENT: ACTIVITIES ARE NOT PREVENTED

## 2024-09-05 NOTE — PROGRESS NOTES
Department of Orthopedic Surgery  Spine Service  Rodriguez Russo PA-C Progress Note        Subjective:    9/5/24  Dominique is resting in bed. Doing well with improvement of neck and shoulder pain. She has noted some dizziness since surgery. Has had vertigo before. States she feels unsteady on her feet.     Vitals  VITALS:  /67   Pulse 81   Temp 97.9 °F (36.6 °C) (Oral)   Resp 16   Ht 1.727 m (5' 8\")   Wt 59.2 kg (130 lb 9.6 oz)   SpO2 99%   BMI 19.86 kg/m²   24HR INTAKE/OUTPUT:    Intake/Output Summary (Last 24 hours) at 9/5/2024 0714  Last data filed at 9/5/2024 0345  Gross per 24 hour   Intake 836.67 ml   Output 50 ml   Net 786.67 ml     URINARY CATHETER OUTPUT ([place]):     DRAIN/TUBE OUTPUT:  Closed/Suction Drain Anterior Neck Bulb-Output (ml): 10 ml      PHYSICAL EXAM:    Orientation:  alert and oriented to person, place and time    Incision:  dressing in place, clean, dry, intact    Upper Extremity Motor :   Deltoid:  5/5  Biceps:  5/5  Triceps:  5/5  Wrist Flexion:  5/5  Wrist Extension:  5/5  Finger Flexion:  5/5  Finger Extension:  5/5    Upper Motor Neuron Signs:  None  Upper Extremity Sensory:  Intact C3-T1 distribution  Flatus:  negative    ABNORMAL EXAM FINDINGS:  none    LABS:    HgB:    Lab Results   Component Value Date/Time    HGB 14.3 08/12/2024 08:24 AM    HGB 15.0 11/02/2011 01:00 PM         ASSESSMENT AND PLAN:    Post operative day 1     1:  Monitor labs and drain output  2:  Activity Level:  OOB with therapy   3:  Pain Control:  Controlled  4:  Discharge Planning:  Possibly home today. Will evaluate with therapy. If she is walking well and feels steady ok to discharge. If not, will wait until tomorrow.

## 2024-09-05 NOTE — PROGRESS NOTES
c-collar  Position Activity Restriction  Spinal Precautions: No Bending, No Lifting, No Twisting  Spinal Precautions: cervical precautions    Subjective  Chart Reviewed: Yes, Orders, Progress Notes, History and Physical, Imaging, Operative Notes  Patient assessed for rehabilitation services?: Yes  Family / Caregiver Present: Yes (significant other)    Subjective: RN approved session, patient seated up in bed upon OT arrival and agreeable to eval. patient's significant other present during eval. patient A & O x 4. unable to recall spinal precautions and was edu. states she feels confident to return home.    Pain: 0/10:     Vitals: Vitals not assessed per clinical judgement, see nursing flowsheet    Social/Functional History:  Lives With: Significant other  Type of Home: House  Home Layout: Two level, Able to Live on Main level with bedroom/bathroom, Performs ADL's on one level  Home Access: Stairs to enter with rails  Entrance Stairs - Number of Steps: 4  Entrance Stairs - Rails: Left  Home Equipment: Walker - Rolling, Rollator, Cane, Reacher   Bathroom Shower/Tub: Walk-in shower  Bathroom Toilet: Standard  Bathroom Equipment: Built-in shower seat  Bathroom Accessibility: Accessible    Receives Help From: Family  ADL Assistance: Independent  Homemaking Assistance: Independent  Ambulation Assistance: Independent  Transfer Assistance: Independent    Active : Yes  Occupation: Retired  Leisure & Hobbies: Garden  Additional Comments: used no AD prior to admit. patient's significant other works outside the home.    VISION:WFL    HEARING:  WFL    COGNITION: WFL    RANGE OF MOTION:  Bilateral Upper Extremity:  WFL    STRENGTH:  Bilateral Upper Extremity:  WFL    SENSATION:   WFL    ADL:   Footwear Management: Supervision.  To doff/don slipper sock seated EOB within spinal precautions by cross over method  Toilet Transfer: Stand By Assistance. With no AD .    IADL:   Not Tested    BALANCE:  Sitting Balance:   plan of care and goals.  Treatment time included skilled education and facilitation of tasks to increase safety and independence with ADL's for improved functional independence and quality of life.    Patient Education:          Patient Education  Education Given To: Patient;Family  Education Provided: Plan of Care;Role of Therapy;Precautions;ADL Adaptive Strategies;Transfer Training;Fall Prevention Strategies  Barriers to Learning: None    Plan:  Times Per Week: 5x  Times Per Day: Once a day  Current Treatment Recommendations: Balance training, Functional mobility training, Endurance training, Neuromuscular re-education, Positioning, Patient/Caregiver education & training, Safety education & training, Self-Care / ADL, Pain management, Coordination training.  See long-term goal time frame for expected duration of plan of care.  If no long-term goals established, a short length of stay is anticipated.    Goals:  Patient goals : return home at [facility]  Short Term Goals  Time Frame for Short Term Goals: by discharge  Short Term Goal 1: patient will tolerate 6 min functional standing with (S) to increase ease with toileting and grooming  Short Term Goal 2: patient will functionally ambulate house hold distances with (S).  Short Term Goal 3: patient will complete ADL routine with MOD I and 0-1 cues for spinal precautions.    AM-[facility] Inpatient Daily Activity Raw Score: 20  AM-[facility] Inpatient ADL T-Scale Score : 42.03    Following session, patient left in safe position with all fall risk precautions in place.

## 2024-09-05 NOTE — CARE COORDINATION
Case Management Assessment Initial Evaluation    Date/Time of Evaluation: 9/5/2024 8:35 AM  Assessment Completed by: Maribel Thompson RN    If patient is discharged prior to next notation, then this note serves as note for discharge by case management.    Patient Name: Dominique Pink                   YOB: 1955  Diagnosis: Spinal stenosis in cervical region [M48.02]  Spinal stenosis of cervical region with radiculopathy [M48.02, M54.12]                   Date / Time: 9/4/2024  5:27 AM  Location: [address] [place] [address]     Patient Admission Status: Inpatient   Readmission Risk Low 0-14, Mod 15-19), High > 20: Readmission Risk Score: 6.8    Current PCP: Chrissie Espinal MD  Health Care Decision Makers:   Primary Decision Maker: Sharla Ayala - Child - [phone]    Secondary Decision Maker: Santino Jonesbenja - Child - [phone]    Additional Case Management Notes: POD 1. Ortho spine following. PT/OT. Pain control. Drain care. Incision monitoring.     Procedures:   9/4 Removal Of Plate C5-C7 and ACDF C4-C5     Imaging: none    Patient Goals/Plan/Treatment Preferences: Spoke with Dominique, she plans home with s/o. Has family support. Has cane and walker if needed. Preferred [facility] per Christina BARNES CM made referral yesterday. Dominique report her niece works there and this remains plan. Denied further needs. She is planning home later today.     Spoke with Maritza at [facility] to update on discharge.        09/05/24 1029   Service Assessment   Patient Orientation Alert and Oriented   Cognition Alert   History Provided By Patient   Primary Caregiver Self   Support Systems Spouse/Significant Other;Children;Family Members   Patient's Healthcare Decision Maker is: Legal Next of Kin   PCP Verified by CM Yes   Last Visit to PCP Within last 6 months   Prior Functional Level Independent in ADLs/IADLs   Current Functional Level Assistance with the following:;Housework;Shopping;Cooking   Can patient return to prior  living arrangement Yes   Ability to make needs known: Good   Family able to assist with home care needs: Yes   Would you like for me to discuss the discharge plan with any other family members/significant others, and if so, who? No   Financial Resources Medicare   Community Resources None   Social/Functional History   Active  Yes  (s/o and friends to assist post-op)   Discharge Planning   Type of Residence House   Living Arrangements Spouse/Significant Other   Current Services Prior To Admission Durable Medical Equipment   Current DME Prior to Arrival Walker;Cane   Potential Assistance Needed N/A;Home Care   DME Ordered? No   Potential Assistance Purchasing Medications No   Type of Home Care Services None   Services At/After Discharge   Transition of Care Consult (CM Consult) Discharge Planning   Services At/After Discharge Home Health;DME   Mode of Transport at Discharge Other (see comment)  (family)   Confirm Follow Up Transport Family   Condition of Participation: Discharge Planning   The Plan for Transition of Care is related to the following treatment goals: heal and go home   Freedom of Choice list was provided with basic dialogue that supports the patient's individualized plan of care/goals, treatment preferences, and shares the quality data associated with the providers?  No  (declined, prefers [facility] as her niece works there)

## 2024-09-05 NOTE — PLAN OF CARE
Problem: Discharge Planning  Goal: Discharge to home or other facility with appropriate resources  Outcome: Progressing     Problem: Pain  Goal: Verbalizes/displays adequate comfort level or baseline comfort level  Outcome: Progressing     Problem: ABCDS Injury Assessment  Goal: Absence of physical injury  Outcome: Progressing     Problem: Safety - Adult  Goal: Free from fall injury  Outcome: Progressing     Problem: Skin/Tissue Integrity - Adult  Goal: Skin integrity remains intact  Outcome: Progressing  Goal: Incisions, wounds, or drain sites healing without S/S of infection  Outcome: Progressing     Problem: Gastrointestinal - Adult  Goal: Minimal or absence of nausea and vomiting  Outcome: Progressing     Problem: Infection - Adult  Goal: Absence of infection at discharge  Outcome: Progressing     Problem: Metabolic/Fluid and Electrolytes - Adult  Goal: Electrolytes maintained within normal limits  Outcome: Progressing

## 2024-09-05 NOTE — PROGRESS NOTES
[facility]  INPATIENT PHYSICAL THERAPY  EVALUATION  [facility] ORTHOPEDICS 7K - 7K-20/020-A    Discharge Recommendations: Home with Home health PT  Equipment Needed: No      Time In: 46  Time Out: 822  Timed Code Treatment Minutes: 25 Minutes  Minutes: 36          Date: 2024  Patient Name: Dominique Pink,  Gender:  female        MRN: [id number]  : 1955  (69 y.o.)      Referring Practitioner: Rodriguez Russo PA-C  Diagnosis: Spinal stenosis in cervical region  Additional Pertinent Hx: Per H&P: \"The patient is a 69-year-old female who presents to our office today with significant symptoms of cervical pain that radiates down her left upper extremity with associated weakness.  She also has complaints of significant difficulties with her balance and loss of hand dexterity.  She is a patient who is known to our service who previously underwent an ACDF C5-C7 by Dr. Luther in 2008.  She was overall doing well until approximately 6 months ago she developed these symptoms.  The symptoms have overall progressively worsened and overall affected her quality of life as well as ability to complete her ADLs.  She has failed to improve despite her conservative treatment including over-the-counter NSAIDs, activity modification, and physical therapy.  With this, she was worked up with an MRI, which demonstrated significant neural compression at C4-C5 adjacent to her previous ACDF surgery.  With her failed conservative treatment, she would like to proceed with surgical intervention and was surgically cleared by her PCP, Dr. Chrissie Espinal, to undergo surgery.\" Pt s/p plate removal of C5-7 and ACDF of C4-C5     Restrictions/Precautions:  Restrictions/Precautions: General Precautions, Fall Risk, Surgical Protocols  Required Braces or Orthoses  Cervical: c-collar  Position Activity Restriction  Spinal Precautions: No Bending, No Lifting, No Twisting  Spinal Precautions: cervical

## 2024-09-06 VITALS
TEMPERATURE: 97.2 F | SYSTOLIC BLOOD PRESSURE: 142 MMHG | WEIGHT: 130.6 LBS | RESPIRATION RATE: 16 BRPM | BODY MASS INDEX: 19.79 KG/M2 | DIASTOLIC BLOOD PRESSURE: 52 MMHG | OXYGEN SATURATION: 100 % | HEIGHT: 68 IN | HEART RATE: 59 BPM

## 2024-09-06 PROCEDURE — 6370000000 HC RX 637 (ALT 250 FOR IP): Performed by: PHYSICIAN ASSISTANT

## 2024-09-06 PROCEDURE — 97535 SELF CARE MNGMENT TRAINING: CPT

## 2024-09-06 PROCEDURE — 2580000003 HC RX 258: Performed by: PHYSICIAN ASSISTANT

## 2024-09-06 PROCEDURE — 97530 THERAPEUTIC ACTIVITIES: CPT

## 2024-09-06 RX ADMIN — OXYCODONE HYDROCHLORIDE 10 MG: 5 TABLET ORAL at 04:48

## 2024-09-06 RX ADMIN — PANTOPRAZOLE SODIUM 40 MG: 40 TABLET, DELAYED RELEASE ORAL at 07:56

## 2024-09-06 RX ADMIN — BACLOFEN 20 MG: 10 TABLET ORAL at 08:01

## 2024-09-06 RX ADMIN — SODIUM CHLORIDE, PRESERVATIVE FREE 10 ML: 5 INJECTION INTRAVENOUS at 08:02

## 2024-09-06 RX ADMIN — BUSPIRONE HYDROCHLORIDE 15 MG: 7.5 TABLET ORAL at 08:03

## 2024-09-06 ASSESSMENT — PAIN SCALES - GENERAL
PAINLEVEL_OUTOF10: 3
PAINLEVEL_OUTOF10: 6

## 2024-09-06 NOTE — PROGRESS NOTES
Reviewed Discharge Papers with Patient and Her Spouse in Entirety. Pt. Is in stable condition upon discharge. Patient instructed to  her prescriptions at her pharmacy. Transport took patient via wheelchair to main entrance to be left in care with her .

## 2024-09-06 NOTE — PLAN OF CARE
Problem: Discharge Planning  Goal: Discharge to home or other facility with appropriate resources  9/6/2024 0614 by Rosetta Brown RN  Outcome: Progressing  9/5/2024 1759 by Heavenly Torres RN  Outcome: Progressing     Problem: Pain  Goal: Verbalizes/displays adequate comfort level or baseline comfort level  9/6/2024 0614 by Rosetta Brown RN  Outcome: Progressing  9/5/2024 1759 by Heavenly Torres RN  Outcome: Progressing     Problem: ABCDS Injury Assessment  Goal: Absence of physical injury  9/6/2024 0614 by Rosetta Brown RN  Outcome: Progressing  9/5/2024 1759 by Heavenly Torres RN  Outcome: Progressing     Problem: Safety - Adult  Goal: Free from fall injury  9/6/2024 0614 by Rosetta Brown RN  Outcome: Progressing  9/5/2024 1759 by Heavenly Torres RN  Outcome: Progressing     Problem: Skin/Tissue Integrity - Adult  Goal: Skin integrity remains intact  Outcome: Progressing  Goal: Incisions, wounds, or drain sites healing without S/S of infection  Outcome: Progressing     Problem: Gastrointestinal - Adult  Goal: Minimal or absence of nausea and vomiting  Outcome: Progressing     Problem: Infection - Adult  Goal: Absence of infection at discharge  Outcome: Progressing     Problem: Metabolic/Fluid and Electrolytes - Adult  Goal: Electrolytes maintained within normal limits  Outcome: Progressing

## 2024-09-06 NOTE — DISCHARGE SUMMARY
Orthopedic Spine Discharge Summary      Patient Identification:   Dominique Pink   : 1955  MRN: [id number]   Account: [id number]      Patient's PCP: Chrissie Espinal MD    Admit Date: 2024     Discharge Date: 2024     Admitting Physician: Dillon Luther MD     Discharge Provider: TORSTEN Wynne CNP , Dr. Dillon Luther    Discharge Diagnoses:      The patient was seen and examined on day of discharge and this discharge summary is in conjunction with any daily progress note from day of discharge.    Operation Performed:        Hospital Course:    The patient is a 69 y.o. female, who presented to our office having symptoms of ***. Due to failed outpatient conservative therapies, the patient elected to undergo operative management on the date of *** at [facility], after which *** was admitted to the 7k floor for continued monitoring and care.    On POD 1, ***.  Throughout the hospital stay, strength was maintained 5/5 with , wrist extension, finger flexion/extension, elbow flexion/extension, and shoulder abduction bilaterally. Patient was eating and drinking without much difficulty. Upon discharge, the drain was intact and patient was sent home with home health for wound and drain management.               Consults:     IP CONSULT TO HOME CARE NEEDS    Disposition:  ***    Condition at Discharge:  Stable        Discharge Medications:   1. Flexeril  2. Percocet          Discharge Instructions:  Continue to use cervical collar for two weeks until seen back in the office. No heavy lifting or strenuous activities. Steri strips can be removed in 7 days. Ok to resume blood thinners 24-48 hours after drain removal. No driving until post op office visit in about two weeks. At that time, we will evaluate the patient with cervical spine x-rays and clinically evaluate recovery progress.        Electronically signed by TORSTEN Wynne CNP on 2024

## 2024-09-06 NOTE — CARE COORDINATION
9/6/24, 10:21 AM EDT    Patient goals/plan/ treatment preferences discussed by  and .  Patient goals/plan/ treatment preferences reviewed with patient/ family.  Patient/ family verbalize understanding of discharge plan and are in agreement with goal/plan/treatment preferences.  Understanding was demonstrated using the teach back method.  AVS provided by RN at time of discharge, which includes all necessary medical information pertaining to the patients current course of illness, treatment, post-discharge goals of care, and treatment preferences.     Services At/After Discharge: Home Health and Nursing service       Plans home this am; new [facility] for cervical drain mgmt.     10:21 AM  Notified [facility] of planned discharge today; last drain output shared.

## 2024-09-06 NOTE — PROGRESS NOTES
[facility] ORTHOPEDICS 7  Occupational Therapy  Daily Note    Discharge Recommendations: Home with assist as needed  Equipment Recommendations: Equipment Needed: No      Time In: 0845  Time Out: 09  Timed Code Treatment Minutes: 38 Minutes  Minutes: 38          Date: 2024  Patient Name: Dominique Pink,   Gender: female      Room: [address] [place] [address]  MRN: [id number]  : 1955  (69 y.o.)  Referring Practitioner: Rodriguez Russo PA-C  Diagnosis: spinal stenosis of cervical region with radiculopathy  Additional Pertinent Hx: per chart review; \"The patient is a 69-year-old female who presents to our office today with significant symptoms of cervical pain that radiates down her left upper extremity with associated weakness.  She also has complaints of significant difficulties with her balance and loss of hand dexterity.  She is a patient who is known to our service who previously underwent an ACDF C5-C7 by Dr. Luther in 2008.  She was overall doing well until approximately 6 months ago she developed these symptoms.  The symptoms have overall progressively worsened and overall affected her quality of life as well as ability to complete her ADLs.  She has failed to improve despite her conservative treatment including over-the-counter NSAIDs, activity modification, and physical therapy.  With this, she was worked up with an MRI, which demonstrated significant neural compression at C4-C5 adjacent to her previous ACDF surgery.  With her failed conservative treatment, she would like to proceed with surgical intervention and was surgically cleared by her PCP, Dr. Chrissie Espinal, to undergo surgery.\" patient underwent a Removal Of Plate C5-C7 and ACDF C4-C5 on 24.    Restrictions/Precautions:  Restrictions/Precautions: General Precautions, Fall Risk, Surgical Protocols  Required Braces or Orthoses  Cervical: c-collar  Position Activity Restriction  Spinal Precautions: No Bending, No

## 2024-09-06 NOTE — PROGRESS NOTES
Department of Orthopedic Surgery  Spine Service   Progress Note        Subjective:    9/5/24  Dominique is resting in bed. Doing well with improvement of neck and shoulder pain. She has noted some dizziness since surgery. Has had vertigo before. States she feels unsteady on her feet.     9/6/24  Dominique is seen sitting in edge of bed with reports of improvement of dizziness since surgery along with improvement of her pre-op radicular sx. She is swallowing well and eating and drinking well. She is ready to go home with  for drain management.   Vitals  VITALS:  BP (!) 142/52   Pulse 59   Temp 97.2 °F (36.2 °C) (Oral)   Resp 16   Ht 1.727 m (5' 8\")   Wt 59.2 kg (130 lb 9.6 oz)   SpO2 100%   BMI 19.86 kg/m²   24HR INTAKE/OUTPUT:    Intake/Output Summary (Last 24 hours) at 9/6/2024 0855  Last data filed at 9/6/2024 0852  Gross per 24 hour   Intake 760 ml   Output 20 ml   Net 740 ml     URINARY CATHETER OUTPUT ([place]):     DRAIN/TUBE OUTPUT:  Closed/Suction Drain Anterior Neck Bulb-Output (ml): 5 ml      PHYSICAL EXAM:    Orientation:  alert and oriented to person, place and time    Incision:  dressing in place, clean, dry, intact    Upper Extremity Motor :   Deltoid:  5/5  Biceps:  5/5  Triceps:  5/5  Wrist Flexion:  5/5  Wrist Extension:  5/5  Finger Flexion:  5/5  Finger Extension:  5/5    Upper Motor [place] Signs:  None  Upper Extremity Sensory:  Intact C3-T1 distribution  Flatus:  negative    ABNORMAL EXAM FINDINGS:  none    LABS:    HgB:    Lab Results   Component Value Date/Time    HGB 14.3 08/12/2024 08:24 AM    HGB 15.0 11/02/2011 01:00 PM         ASSESSMENT AND PLAN:    Post operative day 2     1:  Monitor labs and drain output  2:  Activity Level:  OOB with therapy   3:  Pain Control:  Controlled  4:  Discharge Planning:  Possibly home today. Will evaluate with therapy. If she is walking well and feels steady ok to discharge. If not, will wait until tomorrow. Discharge home with  for drain management

## 2024-09-06 NOTE — PROGRESS NOTES
[facility]  PHYSICAL THERAPY MISSED TREATMENT NOTE  [facility] ORTHOPEDICS 7K    Date: 2024  Patient Name: Dominique Pink        MRN: [id number]   : 1955  (69 y.o.)  Gender: female   Referring Practitioner: Rodriguez Russo PA-C  Diagnosis: Spinal stenosis in cervical region         REASON FOR MISSED TREATMENT:  Missed Treat. Upon arrival patient was on the phone with her . Pt voiced that  is on his way as patient is getting discharged today. Pt voices no concerns for discharge and no need for equipment. PT To attempt to see at next available date if patient is not discharged today.

## 2024-09-06 NOTE — PLAN OF CARE
Problem: Discharge Planning  Goal: Discharge to home or other facility with appropriate resources  9/6/2024 1058 by Heavenly Torres RN  Outcome: Completed  Flowsheets (Taken 9/6/2024 0755)  Discharge to home or other facility with appropriate resources: Identify discharge learning needs (meds, wound care, etc)  9/6/2024 0614 by Rosetta Brown RN  Outcome: Progressing     Problem: Pain  Goal: Verbalizes/displays adequate comfort level or baseline comfort level  9/6/2024 1058 by Heavenly Torres RN  Outcome: Completed  9/6/2024 0614 by Rosetta Brown RN  Outcome: Progressing     Problem: ABCDS Injury Assessment  Goal: Absence of physical injury  9/6/2024 1058 by Heavenly Torres RN  Outcome: Completed  9/6/2024 0614 by Rosetta Brown RN  Outcome: Progressing     Problem: Safety - Adult  Goal: Free from fall injury  9/6/2024 1058 by Heavenly Torres RN  Outcome: Completed  9/6/2024 0614 by Rosetta Brown RN  Outcome: Progressing     Problem: Skin/Tissue Integrity - Adult  Goal: Skin integrity remains intact  9/6/2024 1058 by Heavenly Torres RN  Outcome: Completed  9/6/2024 0614 by Rosetta Brown RN  Outcome: Progressing  Goal: Incisions, wounds, or drain sites healing without S/S of infection  9/6/2024 1058 by Heavenly Torres RN  Outcome: Completed  9/6/2024 0614 by Rosetta Brown RN  Outcome: Progressing     Problem: Gastrointestinal - Adult  Goal: Minimal or absence of nausea and vomiting  9/6/2024 1058 by Heavenly Torres RN  Outcome: Completed  9/6/2024 0614 by Rosetta Brown RN  Outcome: Progressing     Problem: Infection - Adult  Goal: Absence of infection at discharge  9/6/2024 1058 by Heavenly Torres RN  Outcome: Completed  9/6/2024 0614 by Rosetta Brown RN  Outcome: Progressing     Problem: Metabolic/Fluid and Electrolytes - Adult  Goal: Electrolytes maintained within normal limits  9/6/2024 1058 by Heavenly Torres, RN  Outcome: Completed  9/6/2024 0614 by Stephanie

## 2024-09-09 ENCOUNTER — CARE COORDINATION (OUTPATIENT)
Dept: CASE MANAGEMENT | Age: 69
End: 2024-09-09

## 2024-09-09 DIAGNOSIS — M54.12 CERVICAL RADICULOPATHY AT C5: Primary | Chronic | ICD-10-CM

## 2024-09-09 PROCEDURE — 1111F DSCHRG MED/CURRENT MED MERGE: CPT | Performed by: FAMILY MEDICINE

## 2024-09-10 ENCOUNTER — OFFICE VISIT (OUTPATIENT)
Dept: FAMILY MEDICINE CLINIC | Age: 69
End: 2024-09-10

## 2024-09-10 VITALS
OXYGEN SATURATION: 98 % | RESPIRATION RATE: 16 BRPM | DIASTOLIC BLOOD PRESSURE: 68 MMHG | HEART RATE: 49 BPM | WEIGHT: 133.8 LBS | BODY MASS INDEX: 20.28 KG/M2 | HEIGHT: 68 IN | SYSTOLIC BLOOD PRESSURE: 128 MMHG

## 2024-09-10 DIAGNOSIS — Z09 HOSPITAL DISCHARGE FOLLOW-UP: Primary | ICD-10-CM

## 2024-09-10 DIAGNOSIS — R10.9 RECURRENT ABDOMINAL PAIN: ICD-10-CM

## 2024-09-10 DIAGNOSIS — F51.01 PRIMARY INSOMNIA: ICD-10-CM

## 2024-09-10 DIAGNOSIS — M54.12 SPINAL STENOSIS OF CERVICAL REGION WITH RADICULOPATHY: ICD-10-CM

## 2024-09-10 DIAGNOSIS — M48.02 SPINAL STENOSIS OF CERVICAL REGION WITH RADICULOPATHY: ICD-10-CM

## 2024-09-10 DIAGNOSIS — F41.9 ANXIETY: ICD-10-CM

## 2024-09-15 PROBLEM — F41.9 ANXIETY: Status: ACTIVE | Noted: 2024-09-15

## 2024-09-16 ENCOUNTER — TELEPHONE (OUTPATIENT)
Dept: FAMILY MEDICINE CLINIC | Age: 69
End: 2024-09-16

## 2024-09-16 ENCOUNTER — CARE COORDINATION (OUTPATIENT)
Dept: CASE MANAGEMENT | Age: 69
End: 2024-09-16

## 2024-09-25 ENCOUNTER — CARE COORDINATION (OUTPATIENT)
Dept: CASE MANAGEMENT | Age: 69
End: 2024-09-25

## 2024-09-26 ENCOUNTER — CARE COORDINATION (OUTPATIENT)
Dept: CASE MANAGEMENT | Age: 69
End: 2024-09-26

## 2024-09-30 ENCOUNTER — TELEPHONE (OUTPATIENT)
Dept: FAMILY MEDICINE CLINIC | Age: 69
End: 2024-09-30

## 2024-09-30 DIAGNOSIS — R10.9 RECURRENT ABDOMINAL PAIN: ICD-10-CM

## 2024-09-30 DIAGNOSIS — K66.0 ABDOMINAL ADHESIONS: Chronic | ICD-10-CM

## 2024-09-30 RX ORDER — DICYCLOMINE HYDROCHLORIDE 10 MG/1
10 CAPSULE ORAL 4 TIMES DAILY PRN
Qty: 120 CAPSULE | Refills: 1 | Status: SHIPPED | OUTPATIENT
Start: 2024-09-30

## 2024-09-30 NOTE — TELEPHONE ENCOUNTER
It is fine to take 4x a day as needed. She may also try 2 tablets at a time when symptoms are more intense.

## 2024-09-30 NOTE — TELEPHONE ENCOUNTER
Patient taking bentyl 10mg TID PRN for IBS  States it is helping however it wears off before next dose due  Wants to know if she can increase dose or frequency?

## 2024-10-03 ENCOUNTER — OFFICE VISIT (OUTPATIENT)
Dept: FAMILY MEDICINE CLINIC | Age: 69
End: 2024-10-03
Payer: MEDICARE

## 2024-10-03 ENCOUNTER — CARE COORDINATION (OUTPATIENT)
Dept: CASE MANAGEMENT | Age: 69
End: 2024-10-03

## 2024-10-03 VITALS
HEART RATE: 67 BPM | HEIGHT: 68 IN | SYSTOLIC BLOOD PRESSURE: 112 MMHG | OXYGEN SATURATION: 96 % | WEIGHT: 128.25 LBS | BODY MASS INDEX: 19.44 KG/M2 | DIASTOLIC BLOOD PRESSURE: 68 MMHG | RESPIRATION RATE: 19 BRPM

## 2024-10-03 DIAGNOSIS — K59.9 SPASTIC INTESTINE: ICD-10-CM

## 2024-10-03 DIAGNOSIS — R10.32 LEFT LOWER QUADRANT PAIN: Primary | ICD-10-CM

## 2024-10-03 DIAGNOSIS — R10.9 RECURRENT ABDOMINAL PAIN: ICD-10-CM

## 2024-10-03 DIAGNOSIS — K21.9 GASTROESOPHAGEAL REFLUX DISEASE WITHOUT ESOPHAGITIS: ICD-10-CM

## 2024-10-03 PROCEDURE — 1123F ACP DISCUSS/DSCN MKR DOCD: CPT | Performed by: FAMILY MEDICINE

## 2024-10-03 PROCEDURE — 99213 OFFICE O/P EST LOW 20 MIN: CPT | Performed by: FAMILY MEDICINE

## 2024-10-03 RX ORDER — OMEPRAZOLE 40 MG/1
40 CAPSULE, DELAYED RELEASE ORAL
Qty: 30 CAPSULE | Refills: 3 | Status: SHIPPED | OUTPATIENT
Start: 2024-10-03

## 2024-10-03 RX ORDER — OXYCODONE AND ACETAMINOPHEN 5; 325 MG/1; MG/1
TABLET ORAL
COMMUNITY
Start: 2024-10-02 | End: 2024-10-07

## 2024-10-03 NOTE — PROGRESS NOTES
Dominique Pink (:  1955) is a 69 y.o. female,Established patient, here for evaluation of the following chief complaint(s):  Constipation (Had surgery  on neck. Had been taking pain medication. Started taking otc medications and eating fiber to assist. Was able to start having bowel movements with cramping. Today is much better.)      Assessment & Plan   ASSESSMENT/PLAN:  1. Left lower quadrant pain  2. Spastic intestine  3. Gastroesophageal reflux disease without esophagitis  -     omeprazole (PRILOSEC) 40 MG delayed release capsule; Take 1 capsule by mouth every morning (before breakfast), Disp-30 capsule, R-3Normal  4. Recurrent abdominal pain    Flare up IBS. Now improving.    Light diet, good hydration.   Consider probiotic,  Has zofran, bentyl    Return if symptoms worsen or fail to improve.         Subjective   SUBJECTIVE/OBJECTIVE:  HPI  Early this week had some significant cramping, 2/10 now. Much improved at the moment.  Rough times getting back her bowels in order since surgery.   IBS issues with opioids.  Was bound up (constipated).  She is off those now.   Left lower quadrant discomfort was the worse.  Has h/o diverticulitis needing antibiotics.  Good hydration. Eating intake not back to normal.  Yesterday small amount and loose  No fever/chills. No cold sweats. Denies actual abdominal pain.   Bentyl 10 mg daily helping. Heating pad helps as well.  Needs GERD med refilled.     Review of Systems   Constitutional:  Negative for fever.   Respiratory:  Negative for shortness of breath.    Cardiovascular:  Negative for chest pain and leg swelling.   Gastrointestinal:  Positive for constipation. Negative for blood in stool. Abdominal pain: cramping.         Objective   Physical Exam  Constitutional:       General: She is not in acute distress.     Appearance: Normal appearance. She is not ill-appearing.   Cardiovascular:      Rate and Rhythm: Normal rate and regular rhythm.      Heart

## 2024-10-03 NOTE — CARE COORDINATION
Care Transitions Note    Follow Up Call     Attempted to reach patient for transitions of care follow up.  Unable to reach patient.      Outreach Attempts:   HIPAA compliant voicemail left for patient.   [employer] message sent.     Care Summary Note:       Follow Up Appointment:   Future Appointments         Provider Specialty Dept Phone    11/14/2024 9:40 AM Chrissie Espinal MD Family Medicine [phone]            Plan for follow-up call in 6-10 days based on severity of symptoms and risk factors. Plan for next call: symptom management-   self management-     Sandra Cole LPN

## 2024-10-04 ENCOUNTER — CARE COORDINATION (OUTPATIENT)
Dept: CASE MANAGEMENT | Age: 69
End: 2024-10-04

## 2024-10-04 NOTE — CARE COORDINATION
Care Transitions Note    Follow Up Call     Patient Current Location:  Home: [address] [place] [id number]    [facility] Care Coordinator contacted the patient by telephone. Verified name and  as identifiers.    Additional needs identified to be addressed with provider   No needs identified                 Method of communication with provider: none.    Care Summary Note:   Patient returned call. She stated she is feeling pretty good today. She is out driving today. Pain is controlled. It is usually in her left side of neck and shoulders. Good appetite and fluid intake. Denies sob, fever, weakness, nv, swelling, redness or balance issues. Incision healing well. No bladder problems. Ate extra fiber to get bowel regulated. Taking mediication as prescribed. No needs or concerns voiced.     Plan of care updates since last contact:  Review of patient management of conditions/medications:         Advance Care Planning:   Does patient have an Advance Directive: not on file.    Medication Review:  No changes since last call.     Remote Patient Monitoring:  Offered patient enrollment in the Remote Patient Monitoring (RPM) program for in-home monitoring: Patient is not eligible for RPM program because: patient does not have qualifying diagnosis.    Assessments:  Care Transitions Subsequent and Final Call    Subsequent and Final Calls  Do you have any ongoing symptoms?: No  Have your medications changed?: No  Do you have any questions related to your medications?: No  Do you currently have any active services?: Yes  Are you currently active with any services?: Home Health  Do you have any needs or concerns that I can assist you with?: No  Identified Barriers: None  Care Transitions Interventions  Other Interventions:              Follow Up Appointment:   [facility] appointment attended as scheduled   Future Appointments         Provider Specialty Dept Phone    2024 9:40 AM Chrissie Espinal MD Family Medicine

## 2024-10-07 ASSESSMENT — ENCOUNTER SYMPTOMS
BLOOD IN STOOL: 0
SHORTNESS OF BREATH: 0
CONSTIPATION: 1

## 2024-10-09 ENCOUNTER — CARE COORDINATION (OUTPATIENT)
Dept: CASE MANAGEMENT | Age: 69
End: 2024-10-09

## 2024-10-09 NOTE — CARE COORDINATION
Care Transitions Note    Follow Up Call     Attempted to reach patient for transitions of care follow up.  Unable to reach patient.      Outreach Attempts:   HIPAA compliant voicemail left for patient.     Care Summary Note:  1st attempt to contact pt for care transition follow up.  Unable to reach pt.  Left message with contact information and request for call back.      Follow Up Appointment:   Future Appointments         Provider Specialty Dept Phone    11/14/2024 9:40 AM Chrissie Espinal MD Family Medicine [phone]            Plan for follow-up on next business day.  based on severity of symptoms and risk factors. Plan for next call: symptom management-pain, mobility, oral intake, bowels, any new or worsening symptoms,  next call final if stable.      Marie Mixon RN

## 2024-10-10 ENCOUNTER — CARE COORDINATION (OUTPATIENT)
Dept: CASE MANAGEMENT | Age: 69
End: 2024-10-10

## 2024-10-10 NOTE — CARE COORDINATION
Care Transitions Note    Final Call     Patient Current Location:  Home: [address] [place] [id number]    Care Transition Nurse contacted the patient by telephone. Verified name and  as identifiers.    Patient graduated from the Care Transitions program on 10/10/2024.  Patient/family progressing towards self management. .      Advance Care Planning:   Does patient have an Advance Directive: patient declined education.    Handoff:   Patient was not referred to the ACM team due to no additional needs identified.       Care Summary Note: Received incoming call from pt.  Dominique states she is doing okay.  Reports she is still having pain \"off and on\" in her shoulders and neck.  States she was told that she could have that.  Continues to take Baclofen and also Oxycodone.  States that she plans to contact her doctor to decrease her Oxycodone dose or change the medication that is not as strong.  She is up and moving around.  States she is almost back to doing her normal activities.  She is having to take rest periods as needed.  Pt is eating but monitoring her diet very closely.  She is drinking plenty of fluids.  Pt reports having IBS therefore stools go back and forth.  Bowels have been slowly improving.  States stools are a little harder than normal and she is taking Metamucil.  She will continue to monitor and will contact her surgeon if needed.  She is aware of when to contact providers.  States she has an appt with [facility] on 10/31/24.  No questions, concerns or needs at this time.  Final call.  No further outreach.      Assessments:  Care Transitions Subsequent and Final Call    Subsequent and Final Calls  Do you have any ongoing symptoms?: Yes  Patient-reported symptoms: Pain  Have your medications changed?: No  Do you have any questions related to your medications?: No  Do you currently have any active services?: Yes  Are you currently active with any services?: Home Health  Do you have any needs or

## 2024-10-10 NOTE — CARE COORDINATION
Care Transitions Note    Final Call     Attempted to reach patient for transitions of care follow up.  Unable to reach patient.      Outreach Attempts:   HIPAA compliant voicemail left for patient.     Patient closed (unable to reach patient) from the Care Transitions program on 10/10/2024.    Handoff:   Patient was not referred to the ACM team due to unable to contact patient.      Care Summary Note: 2nd attempt to contact pt for final care transition follow up.  Unable to reach pt.  Left message with contact information and return call if having any questions or concerns.      Program will be closed and CTN to sign off if return call is not received from the patient.      Upcoming Appointments:    Future Appointments         Provider Specialty Dept Phone    11/14/2024 9:40 AM Chrissie Espinal MD Family Medicine [phone]            Marie Mixon RN

## 2024-10-15 DIAGNOSIS — F41.1 GAD (GENERALIZED ANXIETY DISORDER): Chronic | ICD-10-CM

## 2024-10-15 RX ORDER — BUSPIRONE HYDROCHLORIDE 15 MG/1
15 TABLET ORAL 2 TIMES DAILY
Qty: 180 TABLET | Refills: 0 | Status: SHIPPED | OUTPATIENT
Start: 2024-10-15 | End: 2025-04-13

## 2024-10-15 NOTE — TELEPHONE ENCOUNTER
Dominique Pink called requesting a refill on the following medications:  Requested Prescriptions     Pending Prescriptions Disp Refills    busPIRone (BUSPAR) 15 MG tablet 180 tablet 0     Sig: Take 15 mg by mouth 2 times daily Send once she calls for refills -- she will be using up 10 mg tablets       Date of last visit: 10/3/2024  Date of next visit (if applicable):11/14/2024  Date of last refill: 8/22/2024   Pharmacy Name: [facility]       Thanks,  Eufemia Jiménez MA

## 2024-11-11 ENCOUNTER — HOSPITAL ENCOUNTER (OUTPATIENT)
Age: 69
Discharge: HOME | End: 2024-11-11
Payer: MEDICARE

## 2024-11-11 DIAGNOSIS — E78.5 HYPERLIPIDEMIA, UNSPECIFIED HYPERLIPIDEMIA TYPE: ICD-10-CM

## 2024-11-11 LAB
ALBUMIN SERPL BCG-MCNC: 4.5 G/DL (ref 3.5–5.1)
ALP SERPL-CCNC: 96 U/L (ref 38–126)
ALT SERPL W/O P-5'-P-CCNC: 15 U/L (ref 11–66)
ANION GAP SERPL CALC-SCNC: 13 MEQ/L (ref 8–16)
AST SERPL-CCNC: 21 U/L (ref 5–40)
BILIRUB SERPL-MCNC: 0.4 MG/DL (ref 0.3–1.2)
BUN SERPL-MCNC: 7 MG/DL (ref 7–22)
CALCIUM SERPL-MCNC: 9.7 MG/DL (ref 8.5–10.5)
CHLORIDE SERPL-SCNC: 103 MEQ/L (ref 98–111)
CHOLEST SERPL-MCNC: 222 MG/DL (ref 100–199)
CO2 SERPL-SCNC: 25 MEQ/L (ref 23–33)
CREAT SERPL-MCNC: 0.6 MG/DL (ref 0.4–1.2)
GFR SERPL CREATININE-BSD FRML MDRD: > 90 ML/MIN/1.73M2
GLUCOSE SERPL-MCNC: 94 MG/DL (ref 70–108)
HDLC SERPL-MCNC: 73 MG/DL
LDLC SERPL CALC-MCNC: 133 MG/DL
POTASSIUM SERPL-SCNC: 4.6 MEQ/L (ref 3.5–5.2)
PROT SERPL-MCNC: 7.1 G/DL (ref 6.1–8)
SODIUM SERPL-SCNC: 141 MEQ/L (ref 135–145)
TRIGL SERPL-MCNC: 82 MG/DL (ref 0–199)

## 2024-11-11 PROCEDURE — 36415 COLL VENOUS BLD VENIPUNCTURE: CPT

## 2024-11-11 PROCEDURE — 80061 LIPID PANEL: CPT

## 2024-11-11 PROCEDURE — 80053 COMPREHEN METABOLIC PANEL: CPT

## 2024-11-14 ENCOUNTER — OFFICE VISIT (OUTPATIENT)
Dept: FAMILY MEDICINE CLINIC | Age: 69
End: 2024-11-14

## 2024-11-14 VITALS
WEIGHT: 125.8 LBS | DIASTOLIC BLOOD PRESSURE: 80 MMHG | BODY MASS INDEX: 19.13 KG/M2 | HEART RATE: 101 BPM | RESPIRATION RATE: 18 BRPM | SYSTOLIC BLOOD PRESSURE: 124 MMHG | TEMPERATURE: 97.9 F | OXYGEN SATURATION: 96 %

## 2024-11-14 DIAGNOSIS — R10.32 LLQ ABDOMINAL PAIN: ICD-10-CM

## 2024-11-14 DIAGNOSIS — L29.89: ICD-10-CM

## 2024-11-14 DIAGNOSIS — F51.01 PRIMARY INSOMNIA: ICD-10-CM

## 2024-11-14 DIAGNOSIS — K58.8 OTHER IRRITABLE BOWEL SYNDROME: ICD-10-CM

## 2024-11-14 DIAGNOSIS — F41.9 ANXIETY: ICD-10-CM

## 2024-11-14 DIAGNOSIS — M79.7 FIBROMYALGIA: Chronic | ICD-10-CM

## 2024-11-14 DIAGNOSIS — L29.89 PALMAR PRURITUS: ICD-10-CM

## 2024-11-14 DIAGNOSIS — R05.1 ACUTE COUGH: ICD-10-CM

## 2024-11-14 DIAGNOSIS — F33.1 MODERATE EPISODE OF RECURRENT MAJOR DEPRESSIVE DISORDER (HCC): Primary | ICD-10-CM

## 2024-11-14 DIAGNOSIS — J06.9 VIRAL URI: ICD-10-CM

## 2024-11-14 DIAGNOSIS — Z78.0 POSTMENOPAUSAL: ICD-10-CM

## 2024-11-14 RX ORDER — CLOTRIMAZOLE AND BETAMETHASONE DIPROPIONATE 10; .64 MG/G; MG/G
CREAM TOPICAL
Qty: 45 G | Refills: 0 | Status: SHIPPED | OUTPATIENT
Start: 2024-11-14

## 2024-11-14 RX ORDER — HYDROXYZINE HYDROCHLORIDE 50 MG/1
50 TABLET, FILM COATED ORAL NIGHTLY PRN
Qty: 30 TABLET | Refills: 1 | Status: SHIPPED | OUTPATIENT
Start: 2024-11-14 | End: 2025-01-13

## 2024-11-14 RX ORDER — BACLOFEN 20 MG/1
20 TABLET ORAL 3 TIMES DAILY PRN
Qty: 90 TABLET | Refills: 3 | Status: SHIPPED | OUTPATIENT
Start: 2024-11-14

## 2024-11-14 RX ORDER — HYDROCODONE BITARTRATE AND ACETAMINOPHEN 5; 325 MG/1; MG/1
1 TABLET ORAL EVERY 6 HOURS
COMMUNITY
Start: 2024-11-04

## 2024-11-14 RX ORDER — HYOSCYAMINE SULFATE 0.125 MG
0.12 TABLET ORAL EVERY 4 HOURS PRN
Qty: 180 TABLET | Refills: 3 | Status: SHIPPED | OUTPATIENT
Start: 2024-11-14

## 2024-11-14 NOTE — PROGRESS NOTES
judgement in acknowledging feelings.   Skin: mildly erythematous palms with an inconsistent erythematous slightly raised edge, no peeling/scaling/flakiness of skin. Soles bilaterally without erythema but they are itchy to her.     No results found for: \"LABA1C\"  No results found for: \"EAG\"  No results found for: \"LABA1C\"    Lab Results   Component Value Date     11/11/2024    K 4.6 11/11/2024     11/11/2024    CO2 25 11/11/2024    BUN 7 11/11/2024    CREATININE 0.6 11/11/2024    CALCIUM 9.7 11/11/2024    GLUCOSE 94 11/11/2024      Lab Results   Component Value Date    WGMNJGLX30 600 08/17/2015   No results found for: \"FOLATE\"  Lab Results   Component Value Date    TSH 2.840 07/23/2012     Lab Results   Component Value Date    CHOL 222 (H) 11/11/2024    CHOL 259 (H) 08/01/2022    CHOL 254 (H) 08/12/2021     Lab Results   Component Value Date    TRIG 82 11/11/2024    TRIG 127 08/01/2022    TRIG 110 08/12/2021     Lab Results   Component Value Date    HDL 73 11/11/2024    HDL 71 08/01/2022    HDL 71 08/12/2021     No components found for: \"LDLCHOLESTEROL\", \"LDLCALC\"  No results found for: \"VLDL\"  No results found for: \"CHOLHDLRATIO\"    No results found for: \"MALBCR\"    Lab Results   Component Value Date    TSH 2.840 07/23/2012      Lab Results   Component Value Date    QOMEDWMU81 600 08/17/2015      Lab Results   Component Value Date    MG 2.1 11/09/2013      Lab Results   Component Value Date    ALT 15 11/11/2024    AST 21 11/11/2024    ALKPHOS 96 11/11/2024    BILITOT 0.4 11/11/2024    BILIDIR <0.2 10/08/2020        Lab Results   Component Value Date    WBC 6.3 08/12/2024    HGB 14.3 08/12/2024    HCT 43.2 08/12/2024    MCV 94.9 08/12/2024     08/12/2024     Lab Results   Component Value Date    SEDRATE 7 03/09/2023     Lab Results   Component Value Date    CRP < 0.30 03/09/2023          All questions and concerns addressed as best as possible. Discussed possible side effects of medications.

## 2024-12-02 ENCOUNTER — HOSPITAL ENCOUNTER (OUTPATIENT)
Age: 69
Discharge: HOME | End: 2024-12-02
Payer: MEDICARE

## 2024-12-02 DIAGNOSIS — F41.9 ANXIETY: ICD-10-CM

## 2024-12-02 DIAGNOSIS — R10.32 LLQ ABDOMINAL PAIN: ICD-10-CM

## 2024-12-02 DIAGNOSIS — M79.7 FIBROMYALGIA: Chronic | ICD-10-CM

## 2024-12-02 DIAGNOSIS — K58.8 OTHER IRRITABLE BOWEL SYNDROME: ICD-10-CM

## 2024-12-02 DIAGNOSIS — J06.9 VIRAL URI: ICD-10-CM

## 2024-12-02 DIAGNOSIS — L29.89 PALMAR PRURITUS: ICD-10-CM

## 2024-12-02 DIAGNOSIS — L29.89: ICD-10-CM

## 2024-12-02 DIAGNOSIS — F51.01 PRIMARY INSOMNIA: ICD-10-CM

## 2024-12-02 DIAGNOSIS — R05.1 ACUTE COUGH: ICD-10-CM

## 2024-12-02 DIAGNOSIS — F33.1 MODERATE EPISODE OF RECURRENT MAJOR DEPRESSIVE DISORDER (HCC): ICD-10-CM

## 2024-12-02 LAB
BASOPHILS ABSOLUTE: 0.1 THOU/MM3 (ref 0–0.1)
BASOPHILS NFR BLD AUTO: 1 %
CRP SERPL-MCNC: < 0.3 MG/DL (ref 0–1)
DEPRECATED RDW RBC AUTO: 44.2 FL (ref 35–45)
EOSINOPHIL NFR BLD AUTO: 1.3 %
EOSINOPHILS ABSOLUTE: 0.1 THOU/MM3 (ref 0–0.4)
ERYTHROCYTE [DISTWIDTH] IN BLOOD BY AUTOMATED COUNT: 13 % (ref 11.5–14.5)
ERYTHROCYTE [SEDIMENTATION RATE] IN BLOOD BY WESTERGREN METHOD: 3 MM/HR (ref 0–20)
FOLATE SERPL-MCNC: > 20 NG/ML (ref 4.8–24.2)
HCT VFR BLD AUTO: 44.2 % (ref 37–47)
HGB BLD-MCNC: 14.9 GM/DL (ref 12–16)
IMM GRANULOCYTES # BLD AUTO: 0.02 THOU/MM3 (ref 0–0.07)
IMM GRANULOCYTES NFR BLD AUTO: 0.3 %
LYMPHOCYTES ABSOLUTE: 1.8 THOU/MM3 (ref 1–4.8)
LYMPHOCYTES NFR BLD AUTO: 29 %
MCH RBC QN AUTO: 31.3 PG (ref 26–33)
MCHC RBC AUTO-ENTMCNC: 33.7 GM/DL (ref 32.2–35.5)
MCV RBC AUTO: 92.9 FL (ref 81–99)
MONOCYTES ABSOLUTE: 0.5 THOU/MM3 (ref 0.4–1.3)
MONOCYTES NFR BLD AUTO: 8.1 %
NEUTROPHILS ABSOLUTE: 3.8 THOU/MM3 (ref 1.8–7.7)
NEUTROPHILS NFR BLD AUTO: 60.3 %
NRBC BLD AUTO-RTO: 0 /100 WBC
PLATELET # BLD AUTO: 256 THOU/MM3 (ref 130–400)
PMV BLD AUTO: 10.4 FL (ref 9.4–12.4)
RBC # BLD AUTO: 4.76 MILL/MM3 (ref 4.2–5.4)
TSH SERPL DL<=0.005 MIU/L-ACNC: 2.68 UIU/ML (ref 0.4–4.2)
VIT B12 SERPL-MCNC: 806 PG/ML (ref 211–911)
WBC # BLD AUTO: 6.3 THOU/MM3 (ref 4.8–10.8)

## 2024-12-02 PROCEDURE — 36415 COLL VENOUS BLD VENIPUNCTURE: CPT

## 2024-12-02 PROCEDURE — 82746 ASSAY OF FOLIC ACID SERUM: CPT

## 2024-12-02 PROCEDURE — 84443 ASSAY THYROID STIM HORMONE: CPT

## 2024-12-02 PROCEDURE — 86038 ANTINUCLEAR ANTIBODIES: CPT

## 2024-12-02 PROCEDURE — 86140 C-REACTIVE PROTEIN: CPT

## 2024-12-02 PROCEDURE — 82607 VITAMIN B-12: CPT

## 2024-12-02 PROCEDURE — 85651 RBC SED RATE NONAUTOMATED: CPT

## 2024-12-02 PROCEDURE — 85025 COMPLETE CBC W/AUTO DIFF WBC: CPT

## 2024-12-04 LAB — NUCLEAR IGG SER QL IA: NORMAL

## 2024-12-16 ENCOUNTER — PATIENT MESSAGE (OUTPATIENT)
Dept: FAMILY MEDICINE CLINIC | Age: 69
End: 2024-12-16

## 2024-12-16 NOTE — TELEPHONE ENCOUNTER
Spoke with patient regarding her symptoms  States she did take medication this morning  C/o racing thoughts, palpitations, increased anxiety, shaking, sweating, insomnia  Symptoms have gradually been getting worse  Been taking medication for approx 4 weeks     Denies thoughts of harming herself or others     Offered office visit with nurse practitioner today  Patient stated she just doesn't think she can leave the house

## 2025-01-16 ENCOUNTER — OFFICE VISIT (OUTPATIENT)
Dept: FAMILY MEDICINE CLINIC | Age: 70
End: 2025-01-16
Payer: MEDICARE

## 2025-01-16 VITALS
RESPIRATION RATE: 16 BRPM | TEMPERATURE: 98.3 F | HEART RATE: 68 BPM | SYSTOLIC BLOOD PRESSURE: 136 MMHG | BODY MASS INDEX: 19.91 KG/M2 | DIASTOLIC BLOOD PRESSURE: 84 MMHG | WEIGHT: 131.4 LBS | HEIGHT: 68 IN

## 2025-01-16 DIAGNOSIS — F33.1 MODERATE EPISODE OF RECURRENT MAJOR DEPRESSIVE DISORDER (HCC): ICD-10-CM

## 2025-01-16 DIAGNOSIS — M79.7 FIBROMYALGIA: Chronic | ICD-10-CM

## 2025-01-16 DIAGNOSIS — F51.01 PRIMARY INSOMNIA: ICD-10-CM

## 2025-01-16 DIAGNOSIS — L70.0 OPEN COMEDONE: ICD-10-CM

## 2025-01-16 DIAGNOSIS — F41.1 GAD (GENERALIZED ANXIETY DISORDER): Primary | Chronic | ICD-10-CM

## 2025-01-16 PROCEDURE — 1159F MED LIST DOCD IN RCRD: CPT | Performed by: FAMILY MEDICINE

## 2025-01-16 PROCEDURE — 1123F ACP DISCUSS/DSCN MKR DOCD: CPT | Performed by: FAMILY MEDICINE

## 2025-01-16 PROCEDURE — 99213 OFFICE O/P EST LOW 20 MIN: CPT | Performed by: FAMILY MEDICINE

## 2025-01-16 PROCEDURE — 1160F RVW MEDS BY RX/DR IN RCRD: CPT | Performed by: FAMILY MEDICINE

## 2025-01-16 PROCEDURE — 1125F AMNT PAIN NOTED PAIN PRSNT: CPT | Performed by: FAMILY MEDICINE

## 2025-01-16 RX ORDER — DIAZEPAM 5 MG/1
5 TABLET ORAL NIGHTLY PRN
Qty: 30 TABLET | Refills: 2 | Status: SHIPPED | OUTPATIENT
Start: 2025-01-16 | End: 2025-04-16

## 2025-01-16 RX ORDER — CEPHALEXIN 500 MG/1
CAPSULE ORAL
COMMUNITY
Start: 2024-12-08

## 2025-01-16 ASSESSMENT — PATIENT HEALTH QUESTIONNAIRE - PHQ9
1. LITTLE INTEREST OR PLEASURE IN DOING THINGS: NOT AT ALL
2. FEELING DOWN, DEPRESSED OR HOPELESS: NOT AT ALL
SUM OF ALL RESPONSES TO PHQ QUESTIONS 1-9: 5
SUM OF ALL RESPONSES TO PHQ9 QUESTIONS 1 & 2: 0
SUM OF ALL RESPONSES TO PHQ QUESTIONS 1-9: 5
5. POOR APPETITE OR OVEREATING: NOT AT ALL
9. THOUGHTS THAT YOU WOULD BE BETTER OFF DEAD, OR OF HURTING YOURSELF: NOT AT ALL
4. FEELING TIRED OR HAVING LITTLE ENERGY: MORE THAN HALF THE DAYS
10. IF YOU CHECKED OFF ANY PROBLEMS, HOW DIFFICULT HAVE THESE PROBLEMS MADE IT FOR YOU TO DO YOUR WORK, TAKE CARE OF THINGS AT HOME, OR GET ALONG WITH OTHER PEOPLE: NOT DIFFICULT AT ALL
3. TROUBLE FALLING OR STAYING ASLEEP: NEARLY EVERY DAY
7. TROUBLE CONCENTRATING ON THINGS, SUCH AS READING THE NEWSPAPER OR WATCHING TELEVISION: NOT AT ALL
SUM OF ALL RESPONSES TO PHQ QUESTIONS 1-9: 5
8. MOVING OR SPEAKING SO SLOWLY THAT OTHER PEOPLE COULD HAVE NOTICED. OR THE OPPOSITE, BEING SO FIGETY OR RESTLESS THAT YOU HAVE BEEN MOVING AROUND A LOT MORE THAN USUAL: NOT AT ALL
6. FEELING BAD ABOUT YOURSELF - OR THAT YOU ARE A FAILURE OR HAVE LET YOURSELF OR YOUR FAMILY DOWN: NOT AT ALL
SUM OF ALL RESPONSES TO PHQ QUESTIONS 1-9: 5

## 2025-01-16 NOTE — PROGRESS NOTES
days ago.  Baclofen helped.  She wants to continue this as needed.      Wt Readings from Last 3 Encounters:   01/16/25 59.6 kg (131 lb 6.4 oz)   11/14/24 57.1 kg (125 lb 12.8 oz)   10/03/24 58.2 kg (128 lb 4 oz)     Review of Systems   Constitutional:  Negative for fatigue and fever.   Respiratory:  Negative for shortness of breath.    Cardiovascular:  Negative for chest pain and leg swelling.          Objective   Physical Exam  Constitutional:       General: She is not in acute distress.     Appearance: Normal appearance. She is not ill-appearing.   Cardiovascular:      Rate and Rhythm: Normal rate and regular rhythm.      Heart sounds: No murmur heard.  Pulmonary:      Effort: Pulmonary effort is normal. No respiratory distress.      Breath sounds: Normal breath sounds. No wheezing.   Genitourinary:     Comments: With chaperone, anorectal area inspected. patient has several small closed comedones in perianal area and then lesion of concern is an open comedone (blackhead).  The top is about 5 mm by 3 mm and is smaller than pencil head.  No drainage or erythema. Skin otherwise healthy.  Musculoskeletal:         General: No swelling.   Neurological:      Mental Status: She is alert.   Psychiatric:         Mood and Affect: Mood normal.       All questions and concerns addressed as best as possible. Discussed possible side effects of medications. Patient voiced understanding of plan of care.         The patient (or guardian, if applicable) and other individuals in attendance with the patient were advised that Artificial Intelligence will be utilized during this visit to record, process the conversation to generate a clinical note, and support improvement of the AI technology. The patient (or guardian, if applicable) and other individuals in attendance at the appointment consented to the use of AI, including the recording.    An electronic signature was used to authenticate this note.    --Chrissie Espinal MD

## 2025-01-20 ASSESSMENT — ENCOUNTER SYMPTOMS: SHORTNESS OF BREATH: 0

## 2025-02-10 ENCOUNTER — HOSPITAL ENCOUNTER (OUTPATIENT)
Dept: WOMENS IMAGING | Age: 70
Discharge: HOME | End: 2025-02-10
Attending: FAMILY MEDICINE
Payer: MEDICARE

## 2025-02-10 VITALS — BODY MASS INDEX: 19.9 KG/M2 | WEIGHT: 130.9 LBS

## 2025-02-10 DIAGNOSIS — Z12.31 VISIT FOR SCREENING MAMMOGRAM: ICD-10-CM

## 2025-02-10 DIAGNOSIS — Z78.0 POSTMENOPAUSAL: ICD-10-CM

## 2025-02-10 PROCEDURE — 77063 BREAST TOMOSYNTHESIS BI: CPT

## 2025-02-10 PROCEDURE — 77080 DXA BONE DENSITY AXIAL: CPT

## 2025-02-11 ENCOUNTER — TELEPHONE (OUTPATIENT)
Dept: FAMILY MEDICINE CLINIC | Age: 70
End: 2025-02-11

## 2025-02-11 NOTE — TELEPHONE ENCOUNTER
There are many things to try.  Another OTC option is magnesium but if that doesn't help then she needs OV to discuss options.     Magnesium glycinate 250 mg to 500 mg nightly or  Triple Magnesium 1 tablet nightly are options.     Lab Results   Component Value Date    WBC 6.3 12/02/2024    HGB 14.9 12/02/2024    HCT 44.2 12/02/2024    MCV 92.9 12/02/2024     12/02/2024     Lab Results   Component Value Date    TSH 2.680 12/02/2024     Lab Results   Component Value Date/Time    MG 2.1 11/09/2013 08:25 AM

## 2025-02-11 NOTE — TELEPHONE ENCOUNTER
Patient called wanting to know if there is anything else she can do to help with restless leg in her right leg. States it mainly happens at night. Has tried massage, icy, and heat.

## 2025-03-24 ENCOUNTER — HOSPITAL ENCOUNTER (OUTPATIENT)
Age: 70
Discharge: HOME | End: 2025-03-24
Payer: MEDICARE

## 2025-03-24 ENCOUNTER — RESULTS FOLLOW-UP (OUTPATIENT)
Dept: FAMILY MEDICINE CLINIC | Age: 70
End: 2025-03-24

## 2025-03-24 ENCOUNTER — HOSPITAL ENCOUNTER (OUTPATIENT)
Dept: GENERAL RADIOLOGY | Age: 70
Discharge: HOME | End: 2025-03-24
Payer: MEDICARE

## 2025-03-24 ENCOUNTER — OFFICE VISIT (OUTPATIENT)
Dept: FAMILY MEDICINE CLINIC | Age: 70
End: 2025-03-24

## 2025-03-24 VITALS
SYSTOLIC BLOOD PRESSURE: 130 MMHG | DIASTOLIC BLOOD PRESSURE: 70 MMHG | BODY MASS INDEX: 20.77 KG/M2 | OXYGEN SATURATION: 97 % | HEART RATE: 62 BPM | RESPIRATION RATE: 18 BRPM | WEIGHT: 136.6 LBS

## 2025-03-24 DIAGNOSIS — M25.552 LEFT HIP PAIN: Primary | ICD-10-CM

## 2025-03-24 DIAGNOSIS — M25.552 LEFT HIP PAIN: ICD-10-CM

## 2025-03-24 DIAGNOSIS — R05.2 SUBACUTE COUGH: ICD-10-CM

## 2025-03-24 PROCEDURE — 71046 X-RAY EXAM CHEST 2 VIEWS: CPT

## 2025-03-24 PROCEDURE — 73502 X-RAY EXAM HIP UNI 2-3 VIEWS: CPT

## 2025-03-24 RX ORDER — AZITHROMYCIN 250 MG/1
TABLET, FILM COATED ORAL
Qty: 6 TABLET | Refills: 0 | Status: SHIPPED | OUTPATIENT
Start: 2025-03-24

## 2025-03-24 ASSESSMENT — ENCOUNTER SYMPTOMS
SORE THROAT: 0
ABDOMINAL PAIN: 0
COUGH: 1
SHORTNESS OF BREATH: 0
RHINORRHEA: 0

## 2025-03-24 NOTE — PROGRESS NOTES
Attending Physician Statement  I have discussed the case, including pertinent history and exam findings with the resident. I also have seen the patient and performed key portions of the examination. I agree with the documented assessment and plan as documented by the resident.      Dominique Pink (:  1955) is a 69 y.o. female,Established patient, here for evaluation of the following chief complaint(s):  lower hip pain (Left side hip pain. Started 3 months ago. Not able to alleviate with otc)      Assessment & Plan   ASSESSMENT/PLAN:  1. Left hip pain  -     XR HIP LEFT (2-3 VIEWS); Future  2. Subacute cough  -     Full PFT Study With Bronchodilator; Future  -     XR CHEST STANDARD (2 VW); Future  -     azithromycin (ZITHROMAX Z-NAYANA) 250 MG tablet; Take two (2) tablets by mouth on day 1, then take one (1) tablet by mouth daily for four (4) days., Disp-6 tablet, R-0Normal    Obtain xray.  Hip issues mostly but some sciatic nerve irritation intermittently  Pain management: lidocaine patches, baclofen + tylenol, higher dose  Chest xray now + zithro; may need to add cefdinir  PfT when closer to her normal     Return for 2025.         Subjective   SUBJECTIVE/OBJECTIVE:  HPI  See resident note.     Left hip pain, twisting motion led to pain. Last 3 days worse.  H/o right hip replacement.   Difficult to put weight on it. Using cane.  Cough ongoing since cold/flu-like symptoms about 5-6 weeks. Former smoker.   2024 cXR copd suggested. No pfts.     Review of Systems   Constitutional:  Negative for fatigue and fever.   HENT:  Negative for congestion.    Respiratory:  Positive for cough. Negative for shortness of breath.    Cardiovascular:  Negative for chest pain and leg swelling.        Objective   Physical Exam  Gen: NAD, alert, awake,   Chest: right lower lung rales.   Heart: RRR  Left leg pain with ROM and weakness      All questions and concerns addressed as best as possible. Discussed possible side 
Z-score: -0.7, This  qualifies as osteopenia.    Total Hip Right:  Bone mineral density (gm/cm2): 0.690, T-score: -2.1, Z-score: -1.0, This  qualifies as osteopenia.    Right forearm: 1/3  Bone mineral density (gm/cm2): 0.727, T-score: 0.5, Z-score: 2.0, This qualifies  as normal bone mineral density.    Prior spine BMD 0.901, T score -1.3  BMD changes versus previous is -15.7% in the spine. This denotes a statistically  significant change.  Impression: This patient has osteoporosis using the World Health Organization criteria. The  patient is at high risk for fracture.    Recommendations:  Therapy recommendations need to be tailored to each individual patient. Using  the World Health Organization (WHO) FRAX absolute fracture algorithm, the  National Osteoporosis Foundation recommends beginning pharmacological therapy in  postmenopausal women and men over the age of 50 with a 10 year probability of a  hip fracture of >3% OR with the 10 year probability of a major osteoporotic  fracture of >20%.    Please reconsider testing based on risk factors. Currently, Medicare will only  reimburse for a central DEXA examination every two years, unless the patient is  on chronic glucocorticoid therapy.    **This report has been created using voice recognition software. It may contain  minor errors which are inherent in voice recognition technology.**    Electronically signed by Dr. Efren Keita    Current Outpatient Medications   Medication Instructions    b complex vitamins capsule 1 capsule, DAILY    baclofen (LIORESAL) 20 mg, Oral, 3 TIMES DAILY PRN    cephALEXin (KEFLEX) 500 MG capsule TAKE 4 CAPSULES BY MOUTH 1 HOUR PRIOR TO DENTAL APPOINTMENT    diazePAM (VALIUM) 5 mg, Oral, NIGHTLY PRN    dicyclomine (BENTYL) 10 mg, Oral, 4 TIMES DAILY PRN, PRN    Metamucil 3.4 g, PRN    Multiple Vitamins-Minerals (THERAPEUTIC MULTIVITAMIN-MINERALS) tablet 1 tablet, DAILY    ondansetron (ZOFRAN-ODT) 4 mg, Oral, 3 TIMES DAILY PRN

## 2025-03-25 NOTE — TELEPHONE ENCOUNTER
Called patient and informed her of the results. She verbalized understanding. Dr. Mc put the nitza in her hip so she is going to contact [facility] and I placed a referral.

## 2025-03-25 NOTE — RESULT ENCOUNTER NOTE
Please let patient know that her xrays -- hip and chest were okay.  Due to hip pain and weakness, we recommend [facility] evaluation.  Does she have a preference?   The zpack treatment should continue for the cough.

## 2025-04-01 ENCOUNTER — HOSPITAL ENCOUNTER (OUTPATIENT)
Dept: CT IMAGING | Age: 70
Discharge: HOME | End: 2025-04-01
Payer: MEDICARE

## 2025-04-01 ENCOUNTER — OFFICE VISIT (OUTPATIENT)
Dept: FAMILY MEDICINE CLINIC | Age: 70
End: 2025-04-01
Payer: MEDICARE

## 2025-04-01 ENCOUNTER — RESULTS FOLLOW-UP (OUTPATIENT)
Dept: FAMILY MEDICINE CLINIC | Age: 70
End: 2025-04-01

## 2025-04-01 VITALS
RESPIRATION RATE: 18 BRPM | SYSTOLIC BLOOD PRESSURE: 124 MMHG | WEIGHT: 135 LBS | BODY MASS INDEX: 20.46 KG/M2 | DIASTOLIC BLOOD PRESSURE: 70 MMHG | HEART RATE: 74 BPM | OXYGEN SATURATION: 98 % | HEIGHT: 68 IN

## 2025-04-01 DIAGNOSIS — R10.84 DIFFUSE ABDOMINAL PAIN: Primary | ICD-10-CM

## 2025-04-01 DIAGNOSIS — R10.84 DIFFUSE ABDOMINAL PAIN: ICD-10-CM

## 2025-04-01 PROBLEM — K21.9 GASTROESOPHAGEAL REFLUX DISEASE: Status: ACTIVE | Noted: 2025-04-01

## 2025-04-01 PROBLEM — K57.30 DIVERTICULA OF COLON: Status: ACTIVE | Noted: 2025-04-01

## 2025-04-01 PROBLEM — M19.90 ARTHRITIS: Status: ACTIVE | Noted: 2025-04-01

## 2025-04-01 LAB
ALBUMIN SERPL BCG-MCNC: 4.7 G/DL (ref 3.4–4.9)
ALP SERPL-CCNC: 92 U/L (ref 35–104)
ALT SERPL W/O P-5'-P-CCNC: 25 U/L (ref 10–35)
ANION GAP SERPL CALC-SCNC: 13 MEQ/L (ref 8–16)
AST SERPL-CCNC: 31 U/L (ref 10–35)
BASOPHILS ABSOLUTE: 0.1 THOU/MM3 (ref 0–0.1)
BASOPHILS NFR BLD AUTO: 0.6 %
BILIRUB SERPL-MCNC: 0.3 MG/DL (ref 0.3–1.2)
BUN SERPL-MCNC: 10 MG/DL (ref 8–23)
CALCIUM SERPL-MCNC: 10.1 MG/DL (ref 8.8–10.2)
CHLORIDE SERPL-SCNC: 105 MEQ/L (ref 98–111)
CO2 SERPL-SCNC: 24 MEQ/L (ref 22–29)
CREAT BLD-MCNC: 0.9 MG/DL (ref 0.5–1.2)
DEPRECATED RDW RBC AUTO: 47.1 FL (ref 35–45)
EOSINOPHIL NFR BLD AUTO: 0.7 %
EOSINOPHILS ABSOLUTE: 0.1 THOU/MM3 (ref 0–0.4)
ERYTHROCYTE [DISTWIDTH] IN BLOOD BY AUTOMATED COUNT: 13.3 % (ref 11.5–14.5)
GFR SERPL CREATININE-BSD FRML MDRD: 69 ML/MIN/1.73M2
GLUCOSE SERPL-MCNC: 109 MG/DL (ref 74–109)
HCT VFR BLD AUTO: 47.3 % (ref 37–47)
HGB BLD-MCNC: 15.2 GM/DL (ref 12–16)
IMM GRANULOCYTES # BLD AUTO: 0.02 THOU/MM3 (ref 0–0.07)
IMM GRANULOCYTES NFR BLD AUTO: 0.2 %
LIPASE SERPL-CCNC: 38 U/L (ref 13–60)
LYMPHOCYTES ABSOLUTE: 1.6 THOU/MM3 (ref 1–4.8)
LYMPHOCYTES NFR BLD AUTO: 18.6 %
MCH RBC QN AUTO: 30.8 PG (ref 26–33)
MCHC RBC AUTO-ENTMCNC: 32.1 GM/DL (ref 32.2–35.5)
MCV RBC AUTO: 95.7 FL (ref 81–99)
MONOCYTES ABSOLUTE: 0.5 THOU/MM3 (ref 0.4–1.3)
MONOCYTES NFR BLD AUTO: 5.4 %
NEUTROPHILS ABSOLUTE: 6.5 THOU/MM3 (ref 1.8–7.7)
NEUTROPHILS NFR BLD AUTO: 74.5 %
NRBC BLD AUTO-RTO: 0 /100 WBC
PLATELET # BLD AUTO: 264 THOU/MM3 (ref 130–400)
PMV BLD AUTO: 10.8 FL (ref 9.4–12.4)
POTASSIUM SERPL-SCNC: 5.7 MEQ/L (ref 3.5–5.2)
PROT SERPL-MCNC: 7.4 G/DL (ref 6.4–8.3)
RBC # BLD AUTO: 4.94 MILL/MM3 (ref 4.2–5.4)
SODIUM SERPL-SCNC: 142 MEQ/L (ref 135–145)
WBC # BLD AUTO: 8.7 THOU/MM3 (ref 4.8–10.8)

## 2025-04-01 PROCEDURE — 80051 ELECTROLYTE PANEL: CPT

## 2025-04-01 PROCEDURE — 84520 ASSAY OF UREA NITROGEN: CPT

## 2025-04-01 PROCEDURE — 1160F RVW MEDS BY RX/DR IN RCRD: CPT | Performed by: NURSE PRACTITIONER

## 2025-04-01 PROCEDURE — 84450 TRANSFERASE (AST) (SGOT): CPT

## 2025-04-01 PROCEDURE — 83690 ASSAY OF LIPASE: CPT

## 2025-04-01 PROCEDURE — 1123F ACP DISCUSS/DSCN MKR DOCD: CPT | Performed by: NURSE PRACTITIONER

## 2025-04-01 PROCEDURE — 82040 ASSAY OF SERUM ALBUMIN: CPT

## 2025-04-01 PROCEDURE — 82565 ASSAY OF CREATININE: CPT

## 2025-04-01 PROCEDURE — 6360000004 HC RX CONTRAST MEDICATION: Performed by: NURSE PRACTITIONER

## 2025-04-01 PROCEDURE — 82947 ASSAY GLUCOSE BLOOD QUANT: CPT

## 2025-04-01 PROCEDURE — 85025 COMPLETE CBC W/AUTO DIFF WBC: CPT

## 2025-04-01 PROCEDURE — 99214 OFFICE O/P EST MOD 30 MIN: CPT | Performed by: NURSE PRACTITIONER

## 2025-04-01 PROCEDURE — 82247 BILIRUBIN TOTAL: CPT

## 2025-04-01 PROCEDURE — 1159F MED LIST DOCD IN RCRD: CPT | Performed by: NURSE PRACTITIONER

## 2025-04-01 PROCEDURE — 82310 ASSAY OF CALCIUM: CPT

## 2025-04-01 PROCEDURE — 36415 COLL VENOUS BLD VENIPUNCTURE: CPT

## 2025-04-01 PROCEDURE — 84155 ASSAY OF PROTEIN SERUM: CPT

## 2025-04-01 PROCEDURE — 84075 ASSAY ALKALINE PHOSPHATASE: CPT

## 2025-04-01 PROCEDURE — 74178 CT ABD&PLV WO CNTR FLWD CNTR: CPT

## 2025-04-01 PROCEDURE — 84460 ALANINE AMINO (ALT) (SGPT): CPT

## 2025-04-01 RX ORDER — IOPAMIDOL 755 MG/ML
85 INJECTION, SOLUTION INTRAVASCULAR
Status: COMPLETED | OUTPATIENT
Start: 2025-04-01 | End: 2025-04-01

## 2025-04-01 RX ADMIN — IOPAMIDOL 85 ML: 755 INJECTION, SOLUTION INTRAVENOUS at 14:14

## 2025-04-01 ASSESSMENT — ENCOUNTER SYMPTOMS
CHEST TIGHTNESS: 0
COUGH: 0
SHORTNESS OF BREATH: 0
EYE PAIN: 0
SORE THROAT: 0
NAUSEA: 0
ABDOMINAL PAIN: 1
BLOOD IN STOOL: 0
VOMITING: 0
DIARRHEA: 1

## 2025-04-01 NOTE — PROGRESS NOTES
[facility]  [address]  [facility] [address]  Dept: [phone]  Dept Fax: [phone]  Loc: [phone]     Visit Date:  4/1/2025    Provider: TORSTEN John - DE[facility]        Patient:  Dominique Pink  YOB: 1955    HPI:     Chief Complaint   Patient presents with    Abdominal Pain     Pt stated pain started Saturday and is located in the middle of her abdomen. Pt stated it gets worse after eating.  Pt describes pain as a spasm.  No known injuries.  Pt has Hx of IBS.  Pt has been taking tylenol for pain with no relief.        History of Present Illness  The patient is a 69-year-old female who presents for evaluation of abdominal pain.    Abdominal discomfort has been present since Sunday, described as distinct from her usual IBS symptoms. The pain is severe, rated as 7 out of 10 at its peak and currently at a 5 out of 10. Pain intensifies upon eating, but she continues to maintain her food intake. Pain is also reported during respiration and when lying down. There is no associated nausea or vomiting, but diarrhea began today with three episodes reported. She feels cold but denies any urinary symptoms or hematuria. No contact with anyone ill with influenza or gastrointestinal issues is reported. Attempts to alleviate the pain with a heating pad and Maalox have been ineffective. Prilosec therapy was initiated yesterday without relief.     PAST SURGICAL HISTORY:  Cholecystectomy  Appendectomy       Medications    Current Outpatient Medications:     diazePAM (VALIUM) 5 MG tablet, Take 1 tablet by mouth nightly as needed for Anxiety for up to 90 days. Max Daily Amount: 5 mg, Disp: 30 tablet, Rfl: 2    baclofen (LIORESAL) 20 MG tablet, Take 1 tablet by mouth 3 times daily as needed (muscle spasm/pain), Disp: 90 tablet, Rfl: 3    ondansetron (ZOFRAN-ODT) 4 MG disintegrating tablet, Take 1 tablet by mouth 3 times daily as

## 2025-04-01 NOTE — TELEPHONE ENCOUNTER
Spoke with patient. She no longer has a colon so she wants to continue follow up with General surgery. Scheduled an appointment with Dr. Crowell. Previously saw Kamron. If you are OK with this sign referral to Dr. Crowell Please.

## 2025-04-03 ENCOUNTER — OFFICE VISIT (OUTPATIENT)
Dept: SURGERY | Age: 70
End: 2025-04-03
Payer: MEDICARE

## 2025-04-03 VITALS
BODY MASS INDEX: 20.26 KG/M2 | TEMPERATURE: 97.7 F | DIASTOLIC BLOOD PRESSURE: 62 MMHG | HEIGHT: 68 IN | WEIGHT: 133.7 LBS | HEART RATE: 64 BPM | SYSTOLIC BLOOD PRESSURE: 98 MMHG | OXYGEN SATURATION: 97 %

## 2025-04-03 DIAGNOSIS — R10.9 RECURRENT ABDOMINAL PAIN: Primary | ICD-10-CM

## 2025-04-03 PROCEDURE — 1123F ACP DISCUSS/DSCN MKR DOCD: CPT | Performed by: SURGERY

## 2025-04-03 PROCEDURE — 1159F MED LIST DOCD IN RCRD: CPT | Performed by: SURGERY

## 2025-04-03 PROCEDURE — 99204 OFFICE O/P NEW MOD 45 MIN: CPT | Performed by: SURGERY

## 2025-04-03 NOTE — PROGRESS NOTES
femur. Compression  screws in the left femoral head and neck.  4. Otherwise negative CT scan of the abdomen and pelvis.              The patient (or guardian, if applicable) and other individuals in attendance with the patient were advised that Artificial Intelligence will be utilized during this visit to record, process the conversation to generate a clinical note, and support improvement of the AI technology. The patient (or guardian, if applicable) and other individuals in attendance at the appointment consented to the use of AI, including the recording.                    Electronically signed by Cindy Crowell MD on 4/11/2025 at 6:21 PM

## 2025-04-22 ENCOUNTER — PREP FOR PROCEDURE (OUTPATIENT)
Dept: SURGERY | Age: 70
End: 2025-04-22

## 2025-04-22 ENCOUNTER — OFFICE VISIT (OUTPATIENT)
Dept: SURGERY | Age: 70
End: 2025-04-22
Payer: MEDICARE

## 2025-04-22 VITALS
DIASTOLIC BLOOD PRESSURE: 78 MMHG | HEIGHT: 67 IN | RESPIRATION RATE: 18 BRPM | TEMPERATURE: 97 F | BODY MASS INDEX: 20.72 KG/M2 | WEIGHT: 132 LBS | OXYGEN SATURATION: 98 % | SYSTOLIC BLOOD PRESSURE: 126 MMHG | HEART RATE: 55 BPM

## 2025-04-22 DIAGNOSIS — Z80.0 FAMILY HISTORY OF COLON CANCER: Primary | ICD-10-CM

## 2025-04-22 DIAGNOSIS — R10.84 GENERALIZED ABDOMINAL PAIN: ICD-10-CM

## 2025-04-22 DIAGNOSIS — K21.9 GASTROESOPHAGEAL REFLUX DISEASE, UNSPECIFIED WHETHER ESOPHAGITIS PRESENT: ICD-10-CM

## 2025-04-22 PROCEDURE — 99213 OFFICE O/P EST LOW 20 MIN: CPT | Performed by: SURGERY

## 2025-04-22 PROCEDURE — 1123F ACP DISCUSS/DSCN MKR DOCD: CPT | Performed by: SURGERY

## 2025-04-22 PROCEDURE — 1159F MED LIST DOCD IN RCRD: CPT | Performed by: SURGERY

## 2025-04-22 PROCEDURE — 1125F AMNT PAIN NOTED PAIN PRSNT: CPT | Performed by: SURGERY

## 2025-04-22 NOTE — PROGRESS NOTES
CHRISTINA Bautista  [facility] GENERAL SURGERY  [address]  [place] [address]  [phone]  Established Patient Evaluation in Office    Pt Name: Dominique Pink  Date of Birth 1955   Today's Date: 4/23/2025  Medical Record Number: [id number]  Referring Provider: No ref. provider found  Primary Care Provider: Chrissie Espinal MD  Chief Complaint   Patient presents with    Surgical Consult     NP refer Dr. Cindy Crowell-Colonoscopy/EGD     ASSESSMENT       Diagnosis Orders   1. Family history of colon cancer        2. Gastroesophageal reflux disease, unspecified whether esophagitis present          Past Medical History:   Diagnosis Date    Abdominal adhesions 02/16/2015    Anxiety     Arthralgia     Arthritis     Cervical radiculopathy at C5 02/16/2015    Chronic abdominal pain     Diverticulitis     Fibromyalgia 08/21/2015    Gastroesophageal reflux disease 4/1/2025    Hypercholesteremia 03/02/2022    IBS (irritable bowel syndrome)     Irritable bowel disease     Other irritable bowel syndrome 03/02/2022    Primary osteoarthritis involving multiple joints 01/04/2019    Tension headache 02/16/2015      PLANS   Assessment & Plan    Schedule Dominique for colonoscopy with possible biopsy/polypectomy   EGD with biopsy   Potential diagnostic/therapeutic options and alternatives were discussed with the patient in the office. Potential risks of bleeding, infection, perforation and missed lesions was reviewed. Potential for biopsy and/or polypectomy was discussed. We also discussed the use of IV sedation and colon preparation instructions. The patient was given an opportunity to ask questions. Once answered, the patient was agreeable to proceed with the procedure.  Potential diagnostic and therapeutic options and alternatives were discussed with the patient in the office. Potential risks of bleeding, infection, perforation and missed lesions was reviewed. Potential for biopsy

## 2025-05-03 DIAGNOSIS — K21.9 GASTROESOPHAGEAL REFLUX DISEASE WITHOUT ESOPHAGITIS: ICD-10-CM

## 2025-05-05 DIAGNOSIS — K21.9 GASTROESOPHAGEAL REFLUX DISEASE WITHOUT ESOPHAGITIS: ICD-10-CM

## 2025-05-05 RX ORDER — OMEPRAZOLE 40 MG/1
CAPSULE, DELAYED RELEASE ORAL
Qty: 30 CAPSULE | Refills: 0 | OUTPATIENT
Start: 2025-05-05

## 2025-05-05 RX ORDER — OMEPRAZOLE 40 MG/1
40 CAPSULE, DELAYED RELEASE ORAL
Qty: 30 CAPSULE | Refills: 3 | Status: SHIPPED | OUTPATIENT
Start: 2025-05-05

## 2025-05-05 NOTE — TELEPHONE ENCOUNTER
This medication refill is regarding a telephone request. Refill requested by patient.    Requested Prescriptions     Pending Prescriptions Disp Refills    omeprazole (PRILOSEC) 40 MG delayed release capsule 30 capsule 3     Sig: Take 1 capsule by mouth every morning (before breakfast)       Date of last visit: 4/1/2025   Date of next visit: 5/19/2025  Date of last refill: Previously discontinued      Last Lipid Panel:    Lab Results   Component Value Date/Time    CHOL 222 11/11/2024 07:41 AM    TRIG 82 11/11/2024 07:41 AM    HDL 73 11/11/2024 07:41 AM     Last CMP:   Lab Results   Component Value Date     04/01/2025    K 5.7 (H) 04/01/2025     04/01/2025    CO2 24 04/01/2025    BUN 10 04/01/2025    CREATININE 0.9 04/01/2025    GLUCOSE 109 04/01/2025    CALCIUM 10.1 04/01/2025    BILITOT 0.3 04/01/2025    ALKPHOS 92 04/01/2025    AST 31 04/01/2025    ALT 25 04/01/2025    LABGLOM 69 04/01/2025       Last Thyroid:    Lab Results   Component Value Date    TSH 2.680 12/02/2024    T4FREE 0.95 07/23/2012     Rx verified, ordered and set to EP.

## 2025-05-06 RX ORDER — SODIUM CHLORIDE 9 MG/ML
INJECTION, SOLUTION INTRAVENOUS PRN
Status: CANCELLED | OUTPATIENT
Start: 2025-05-06

## 2025-05-06 RX ORDER — SODIUM CHLORIDE 450 MG/100ML
INJECTION, SOLUTION INTRAVENOUS CONTINUOUS
Status: CANCELLED | OUTPATIENT
Start: 2025-05-06

## 2025-05-06 RX ORDER — SODIUM CHLORIDE 0.9 % (FLUSH) 0.9 %
5-40 SYRINGE (ML) INJECTION EVERY 12 HOURS SCHEDULED
Status: CANCELLED | OUTPATIENT
Start: 2025-05-06

## 2025-05-06 RX ORDER — SODIUM CHLORIDE 0.9 % (FLUSH) 0.9 %
5-40 SYRINGE (ML) INJECTION PRN
Status: CANCELLED | OUTPATIENT
Start: 2025-05-06

## 2025-05-06 NOTE — H&P
DO CECILIA Monzon DR GENERAL SURGERY  PRE SEDATION HISTORY AND PHYSICAL      Pt Name: Dominique Pink  MRN: [id number]  YOB: 1955  Provider Performing Procedure: DO CHRISTINA Monzon  Primary Care Physician: Chrissie Espinal MD  PRE-PROCEDURE   DNR-CCA/DNR-CC - No  Brief History/Pre-Procedure Diagnosis  Pre-Op Diagnosis Codes:      * Generalized abdominal pain [R10.84]   MEDICAL HISTORY       has a past medical history of Abdominal adhesions, Anxiety, Arthralgia, Arthritis, Cervical radiculopathy at C5, Chronic abdominal pain, Diverticulitis, Fibromyalgia, Gastroesophageal reflux disease, Hypercholesteremia, IBS (irritable bowel syndrome), Irritable bowel disease, Other irritable bowel syndrome, Primary osteoarthritis involving multiple joints, and Tension headache.  SURGICAL HISTORY   has a past surgical history that includes Appendectomy; Ovary removal; Tonsillectomy; Abdominal exploration surgery; Colonoscopy (11/15/11); Upper gastrointestinal endoscopy (11/15/11); Cholecystectomy (3/4/08); cervical fusion (oct 2008); other surgical history (12/9/13); colectomy (12/09/2013); Hip fracture surgery (Left, 06/2018); Colonoscopy (Left, 11/25/2020); Breast biopsy (Right, 12/14/2016); [facility] STEREO BREAST BX W LOC DEVICE 1ST LESION RIGHT (Right, 11/18/2016); Total hip arthroplasty (Right, 1/23/2024); and cervical fusion (N/A, 9/4/2024).  ALLERGIES   Allergies as of 04/22/2025 - Fully Reviewed 04/22/2025   Allergen Reaction Noted    Dexamethasone Anxiety, Palpitations, and Other (See Comments) 09/05/2024    Doxepin  08/22/2024    Lipitor [atorvastatin]  08/22/2024    Prozac [fluoxetine]  12/17/2024    Zoloft [sertraline]  12/17/2024    Aspirin Nausea Only and Other (See Comments) 11/07/2011    Elavil [amitriptyline hcl]  08/26/2020    Ibuprofen Other (See Comments) 11/21/2013    Morphine Nausea And Vomiting and Other (See Comments) 07/24/2012    Remeron [mirtazapine] Other (See  Comments) 08/24/2017    Trazodone and nefazodone Nausea Only 12/16/2015     MEDICATIONS   Coumadin Use Last 7 Days: No  Antiplatelet drug therapy use last 7 days: No  Other anticoagulant use last 7 days: No  Prior to Admission medications    Medication Sig Start Date End Date Taking? Authorizing Provider   omeprazole (PRILOSEC) 40 MG delayed release capsule Take 1 capsule by mouth every morning (before breakfast) 5/5/25  Yes Chrissie Espinal MD   diazePAM (VALIUM) 5 MG tablet Take 1 tablet by mouth nightly as needed for Anxiety for up to 90 days. Max Daily Amount: 5 mg 1/16/25 5/7/25 Yes Chrissie Espinal MD   baclofen (LIORESAL) 20 MG tablet Take 1 tablet by mouth 3 times daily as needed (muscle spasm/pain) 11/14/24  Yes Chrissie Espinal MD   dicyclomine (BENTYL) 10 MG capsule Take 1 capsule by mouth 4 times daily as needed (intestinal spasm and abdominal pain) PRN 9/30/24  Yes Chrissie Espinal MD   ondansetron (ZOFRAN-ODT) 4 MG disintegrating tablet Take 1 tablet by mouth 3 times daily as needed for Nausea or Vomiting 8/22/24  Yes Chrissie Espinal MD   b complex vitamins capsule Take 1 capsule by mouth daily   Yes Augustine Cohen MD   psyllium (METAMUCIL) 28.3 % POWD powder Take 3.4 g by mouth as needed   Yes Augustine Cohen MD   vitamin D (CHOLECALCIFEROL) 1000 UNIT TABS tablet Take 1 tablet by mouth daily   Yes Augustine Cohen MD   Multiple Vitamins-Minerals (THERAPEUTIC MULTIVITAMIN-MINERALS) tablet Take 1 tablet by mouth daily   Yes Augustine Cohen MD     PHYSICAL EXAMINATION   Vitals:  height is 1.702 m (5' 7\") and weight is 57.2 kg (126 lb). Her temporal temperature is 97.4 °F (36.3 °C). Her blood pressure is 131/94 (abnormal) and her pulse is 123 (abnormal). Her respiration is 17 and oxygen saturation is 99%.   Mental Status: alert, cooperative  Heart:   Heart regular rate and rhythm     Lungs:  Clear      Abdomen:  Soft, nontender       PLANNED PROCEDURE   Colonoscopy

## 2025-05-06 NOTE — DISCHARGE INSTRUCTIONS
ENDOSCOPY DISCHARGE INSTRUCTION SHEET  EGD/COLONOSCOPY  CHRISTINA BERNAL  5/7/2025    Call your doctor at [phone] if any of the following symptoms occur at anytime   *Excessive pain - a few cramps are to be expected   *Temperature above 100 degrees    *Excessive bleeding or large clots of blood. Don't worry about a few specks of blood.   *Repeated nausea and vomiting.    2.    Follow colonoscopy recommended in 5 years.      [address] [facility] [address]  [place] [address]  Electronically signed by Benson Mendoza DO on 5/7/2025 at 8:17 AM

## 2025-05-07 ENCOUNTER — APPOINTMENT (OUTPATIENT)
Dept: ENDOSCOPY | Age: 70
End: 2025-05-07
Attending: SURGERY
Payer: MEDICARE

## 2025-05-07 ENCOUNTER — ANESTHESIA (OUTPATIENT)
Dept: ENDOSCOPY | Age: 70
End: 2025-05-07
Payer: MEDICARE

## 2025-05-07 ENCOUNTER — ANESTHESIA EVENT (OUTPATIENT)
Dept: ENDOSCOPY | Age: 70
End: 2025-05-07
Payer: MEDICARE

## 2025-05-07 ENCOUNTER — HOSPITAL ENCOUNTER (OUTPATIENT)
Age: 70
Setting detail: OUTPATIENT SURGERY
Discharge: HOME | End: 2025-05-07
Attending: SURGERY | Admitting: SURGERY
Payer: MEDICARE

## 2025-05-07 VITALS
HEIGHT: 67 IN | OXYGEN SATURATION: 98 % | RESPIRATION RATE: 14 BRPM | DIASTOLIC BLOOD PRESSURE: 77 MMHG | SYSTOLIC BLOOD PRESSURE: 121 MMHG | TEMPERATURE: 97.3 F | BODY MASS INDEX: 19.78 KG/M2 | HEART RATE: 75 BPM | WEIGHT: 126 LBS

## 2025-05-07 PROCEDURE — 7100000011 HC PHASE II RECOVERY - ADDTL 15 MIN: Performed by: SURGERY

## 2025-05-07 PROCEDURE — 3700000000 HC ANESTHESIA ATTENDED CARE: Performed by: SURGERY

## 2025-05-07 PROCEDURE — 88305 TISSUE EXAM BY PATHOLOGIST: CPT

## 2025-05-07 PROCEDURE — 2580000003 HC RX 258: Performed by: SURGERY

## 2025-05-07 PROCEDURE — 2580000003 HC RX 258: Performed by: NURSE ANESTHETIST, CERTIFIED REGISTERED

## 2025-05-07 PROCEDURE — 6360000002 HC RX W HCPCS: Performed by: NURSE ANESTHETIST, CERTIFIED REGISTERED

## 2025-05-07 PROCEDURE — 3609012400 HC EGD TRANSORAL BIOPSY SINGLE/MULTIPLE: Performed by: SURGERY

## 2025-05-07 PROCEDURE — 3700000001 HC ADD 15 MINUTES (ANESTHESIA): Performed by: SURGERY

## 2025-05-07 PROCEDURE — 7100000010 HC PHASE II RECOVERY - FIRST 15 MIN: Performed by: SURGERY

## 2025-05-07 PROCEDURE — 3609027000 HC COLONOSCOPY: Performed by: SURGERY

## 2025-05-07 PROCEDURE — 43239 EGD BIOPSY SINGLE/MULTIPLE: CPT | Performed by: SURGERY

## 2025-05-07 PROCEDURE — 45378 DIAGNOSTIC COLONOSCOPY: CPT | Performed by: SURGERY

## 2025-05-07 RX ORDER — SODIUM CHLORIDE 9 MG/ML
INJECTION, SOLUTION INTRAVENOUS PRN
Status: DISCONTINUED | OUTPATIENT
Start: 2025-05-07 | End: 2025-05-07 | Stop reason: HOSPADM

## 2025-05-07 RX ORDER — SODIUM CHLORIDE 9 MG/ML
INJECTION, SOLUTION INTRAVENOUS
Status: DISCONTINUED | OUTPATIENT
Start: 2025-05-07 | End: 2025-05-07 | Stop reason: SDUPTHER

## 2025-05-07 RX ORDER — LIDOCAINE HYDROCHLORIDE 20 MG/ML
INJECTION, SOLUTION EPIDURAL; INFILTRATION; INTRACAUDAL; PERINEURAL
Status: DISCONTINUED | OUTPATIENT
Start: 2025-05-07 | End: 2025-05-07 | Stop reason: SDUPTHER

## 2025-05-07 RX ORDER — SODIUM CHLORIDE 0.9 % (FLUSH) 0.9 %
5-40 SYRINGE (ML) INJECTION EVERY 12 HOURS SCHEDULED
Status: DISCONTINUED | OUTPATIENT
Start: 2025-05-07 | End: 2025-05-07 | Stop reason: HOSPADM

## 2025-05-07 RX ORDER — SODIUM CHLORIDE 0.9 % (FLUSH) 0.9 %
5-40 SYRINGE (ML) INJECTION PRN
Status: DISCONTINUED | OUTPATIENT
Start: 2025-05-07 | End: 2025-05-07 | Stop reason: HOSPADM

## 2025-05-07 RX ORDER — SODIUM CHLORIDE 450 MG/100ML
INJECTION, SOLUTION INTRAVENOUS CONTINUOUS
Status: DISCONTINUED | OUTPATIENT
Start: 2025-05-07 | End: 2025-05-07 | Stop reason: HOSPADM

## 2025-05-07 RX ADMIN — LIDOCAINE HYDROCHLORIDE 100 MG: 20 INJECTION, SOLUTION EPIDURAL; INFILTRATION; INTRACAUDAL; PERINEURAL at 07:58

## 2025-05-07 RX ADMIN — SODIUM CHLORIDE: 0.9 INJECTION, SOLUTION INTRAVENOUS at 07:19

## 2025-05-07 RX ADMIN — SODIUM CHLORIDE: 9 INJECTION, SOLUTION INTRAVENOUS at 07:56

## 2025-05-07 RX ADMIN — PROPOFOL 50 MG: 10 INJECTION, EMULSION INTRAVENOUS at 08:02

## 2025-05-07 RX ADMIN — PROPOFOL 100 MG: 10 INJECTION, EMULSION INTRAVENOUS at 07:58

## 2025-05-07 RX ADMIN — PROPOFOL 50 MG: 10 INJECTION, EMULSION INTRAVENOUS at 08:06

## 2025-05-07 ASSESSMENT — PAIN - FUNCTIONAL ASSESSMENT
PAIN_FUNCTIONAL_ASSESSMENT: NONE - DENIES PAIN
PAIN_FUNCTIONAL_ASSESSMENT: NONE - DENIES PAIN
PAIN_FUNCTIONAL_ASSESSMENT: 0-10
PAIN_FUNCTIONAL_ASSESSMENT: NONE - DENIES PAIN

## 2025-05-07 ASSESSMENT — PAIN DESCRIPTION - DESCRIPTORS: DESCRIPTORS: ACHING

## 2025-05-07 NOTE — OP NOTE
[facility]  RECORD OF OPERATION  PATIENT NAME: Dominique Pink  MEDICAL RECORD NO. [id number]  DATE: 5/7/2025  SURGEON: CHRISTINA Bautista  PRIMARY CARE PHSYICIAN: Chrissie Espinal MD     PROCEDURE PERFORMED:  5/7/2025  PREOPERATIVE DIAGNOSES: Pre-Op Diagnosis Codes:      * Generalized abdominal pain [R10.84]   POSTOPERATIVE DIAGNOSIS: reflux esophagitis, diverticulosis  PROCEDURE PERFORMED:  EGD & colonoscopy with biopsy of GE junction  SURGEON:  Dr. Benson Mendoza.  ANESTHESIA:   IV sedation  per anesthesia  ESTIMATED BLOOD LOSS:  None.  SPECIMENS:   GE junction biopsy sent to pathology for analysis  COMPLICATIONS:  None immediately appreciated.     DISCUSSION:  Dominique is a 70 y.o.-year-old female who presented to the office with symptoms of GERD and family history of colono cancer at the request of Chrissie Espinal MD. After a history and physical examination was performed, potential diagnostic and therapeutic modalities were discussed with the patient.  Radiographic and endoscopic studies were discussed. The risks, complications and benefits were reviewed. She was given an opportunity to ask questions and once answered informed consent was obtained. She was brought to the Endoscopy Suite on 5/7/2025 for the procedure.     OPERATIVE FINDINGS:  At the time of endoscopy the esophageal contours within normal limits. No evidence of hiatal hernia.  Pt had grade 2 reflux esophagitis, gastric contours within normal limits.  There is no evidence of bleeding or ulcerations present within the stomach, pylorus is widely patent and no evidence of duodenal ulcerations or bleeding were evident.  At the time of colonoscopy, good prep was appreciated. Scope was able to be advanced to level of cecum. Aside from diverticulosis was unremarkable.     PROCEDURE:  The patient was brought to the Endoscopy Suite, placed in left lateral Shrestha position, placed under cardiac, telemetry, blood  well without any immediate complication evident.     Postoperative findings were discussed with the patient's family. She was given discharge instructions and will follow up colonoscopy in 5 years based on family history.      Electronically signed by Benson Mendoza DO on 5/7/2025 at 8:20 AM

## 2025-05-07 NOTE — ANESTHESIA PRE PROCEDURE
Department of Anesthesiology  Preprocedure Note       Name:  Dominique Pink   Age:  70 y.o.  :  1955                                          MRN:  [id number]         Date:  2025      Surgeon: Surgeon(s):  Benson Mendoza DO    Procedure: Procedure(s):  Colonoscopy possible biospy possible polypectomy  EGD possible biospy possible dilation    Medications prior to admission:   Prior to Admission medications    Medication Sig Start Date End Date Taking? Authorizing Provider   omeprazole (PRILOSEC) 40 MG delayed release capsule Take 1 capsule by mouth every morning (before breakfast) 25  Yes Chrissie Espinal MD   diazePAM (VALIUM) 5 MG tablet Take 1 tablet by mouth nightly as needed for Anxiety for up to 90 days. Max Daily Amount: 5 mg 25 Yes Chrissie Espinal MD   baclofen (LIORESAL) 20 MG tablet Take 1 tablet by mouth 3 times daily as needed (muscle spasm/pain) 24  Yes Chrissie Espinal MD   dicyclomine (BENTYL) 10 MG capsule Take 1 capsule by mouth 4 times daily as needed (intestinal spasm and abdominal pain) PRN 24  Yes Chrissie Espinal MD   ondansetron (ZOFRAN-ODT) 4 MG disintegrating tablet Take 1 tablet by mouth 3 times daily as needed for Nausea or Vomiting 24  Yes Chrissie Espinal MD   b complex vitamins capsule Take 1 capsule by mouth daily   Yes Augustine Cohen MD   psyllium (METAMUCIL) 28.3 % POWD powder Take 3.4 g by mouth as needed   Yes ProviderAugustine MD   vitamin D (CHOLECALCIFEROL) 1000 UNIT TABS tablet Take 1 tablet by mouth daily   Yes Augustine Cohen MD   Multiple Vitamins-Minerals (THERAPEUTIC MULTIVITAMIN-MINERALS) tablet Take 1 tablet by mouth daily   Yes ProviderAugustine MD       Current medications:    Current Facility-Administered Medications   Medication Dose Route Frequency Provider Last Rate Last Admin    0.45 % sodium chloride infusion   IntraVENous Continuous Benson Mendoza DO        sodium

## 2025-05-07 NOTE — PROGRESS NOTES
Recovery mode, pt denies discomfort. Passing gas, taking in fluids. Dr. Mendoza discussed findings with pt and responsible party. Discharge instructions provided and understanding verbalized.

## 2025-05-07 NOTE — PROGRESS NOTES
EGD complete, photos taken, 1 specimens taken, pt tolerated procedure well    Scope Number LOANER 484 used.       Scope # .  Colonoscopy completed, tolerated well. Photos taken.

## 2025-05-07 NOTE — ANESTHESIA POSTPROCEDURE EVALUATION
Department of Anesthesiology  Postprocedure Note    Patient: Dominique Pink  MRN: [id number]  YOB: 1955  Date of evaluation: 5/7/2025    Procedure Summary       Date: 05/07/25 Room / Location: [place] [address] / [facility]    Anesthesia Start: 0756 Anesthesia Stop: 0813    Procedures:       Colonoscopy possible biospy possible polypectomy (Left: Anus)      ESOPHAGOGASTRODUODENOSCOPY BIOPSY Diagnosis:       Generalized abdominal pain      (Generalized abdominal pain [R10.84])    Surgeons: Benson Mendoza DO Responsible Provider: Darrel Concepcion DO    Anesthesia Type: MAC ASA Status: 2            Anesthesia Type: No value filed.    Rin Phase I: Rin Score: 10    Rin Phase II:      Anesthesia Post Evaluation    Patient location during evaluation: PACU  Patient participation: complete - patient participated  Level of consciousness: sleepy but conscious  Airway patency: patent  Nausea & Vomiting: no nausea and no vomiting  Cardiovascular status: hemodynamically stable  Respiratory status: acceptable  Hydration status: stable  Pain management: adequate        No notable events documented.

## 2025-05-07 NOTE — PROGRESS NOTES
Admitted to Endo department and admitted to Endo room 11  Plan of care reviewed with patient.   Call light within reach.   Allergies reviewed with patient  Bed in lowest position, locked, with one bed rail up.   Appropriate arm bands on patient.   Bathroom offered.   All questions and concerns of patient addressed    Name: Pro  Relationship to patient: friend  Phone number: [phone]

## 2025-05-08 ENCOUNTER — TELEMEDICINE (OUTPATIENT)
Dept: FAMILY MEDICINE CLINIC | Age: 70
End: 2025-05-08

## 2025-05-08 DIAGNOSIS — K58.8 OTHER IRRITABLE BOWEL SYNDROME: Primary | ICD-10-CM

## 2025-05-08 DIAGNOSIS — F41.9 ANXIETY: ICD-10-CM

## 2025-05-08 DIAGNOSIS — R10.9 RECURRENT ABDOMINAL PAIN: ICD-10-CM

## 2025-05-08 DIAGNOSIS — K66.0 ABDOMINAL ADHESIONS: Chronic | ICD-10-CM

## 2025-05-08 NOTE — PROGRESS NOTES
Dominique Pink, was evaluated through a synchronous (real-time) audio-video encounter. The patient (or guardian if applicable) is aware that this is a billable service, which includes applicable co-pays. This Virtual Visit was conducted with patient's (and/or legal guardian's) consent. Patient identification was verified, and a caregiver was present when appropriate.   The patient was located at Home: [address] [address]  Provider was located at [facility] ([facility] Dept[facility] [address]  [place] [address]  Confirm you are appropriately licensed, registered, or certified to deliver care in the state where the patient is located as indicated above. If you are not or unsure, please re-schedule the visit: Yes, I confirm.     Dominique Pink (:  1955) is a Established patient, presenting virtually for evaluation of the following:      Below is the assessment and plan developed based on review of pertinent history, physical exam, labs, studies, and medications.     Assessment & Plan  Other irritable bowel syndrome   Chronic, not at goal (unstable), will take Bentyl and omeprazole daily as prescribed. Will increase diazepam to BID. Consider GI if no improvement.         Anxiety   Chronic, not at goal (unstable), will increase diazepam to BID. Anxiety has worsened and is likely contributing to her GI symptoms.           Return for Regular follow up as scheduled and as needed.       Subjective   Patient presents for follow up on abdominal pain. She had EGD and colonscopy with general surgery that were unremarkable. Pain is still present. Pain occurs after eating and is moderate and crampy. States she has been under increased stress recently. Feels may be related to her anxiety. Has Bentyl but has not been using it regularly. States she is often afraid to eat because of the pain.    Abdominal Pain      Review of Systems   Gastrointestinal:  Positive for abdominal pain.

## 2025-05-09 RX ORDER — DICYCLOMINE HYDROCHLORIDE 10 MG/1
10 CAPSULE ORAL 4 TIMES DAILY PRN
Qty: 120 CAPSULE | Refills: 1 | Status: SHIPPED | OUTPATIENT
Start: 2025-05-09

## 2025-05-09 ASSESSMENT — ENCOUNTER SYMPTOMS: ABDOMINAL PAIN: 1

## 2025-05-09 NOTE — ASSESSMENT & PLAN NOTE
Chronic, not at goal (unstable), will increase diazepam to BID. Anxiety has worsened and is likely contributing to her GI symptoms.

## 2025-05-09 NOTE — ASSESSMENT & PLAN NOTE
Chronic, not at goal (unstable), will take Bentyl and omeprazole daily as prescribed. Will increase diazepam to BID. Consider GI if no improvement.

## 2025-05-09 NOTE — TELEPHONE ENCOUNTER
Dominique Pink called requesting a refill on the following medications:  Requested Prescriptions     Pending Prescriptions Disp Refills    dicyclomine (BENTYL) 10 MG capsule 120 capsule 1     Sig: Take 1 capsule by mouth 4 times daily as needed (intestinal spasm and abdominal pain) PRN       Date of last visit: 5/8/2025  Date of next visit (if applicable):5/19/2025  Date of last refill: 09/30/2024  Pharmacy Name: [place], [place].      Thanks,  MOY MERCHANT, DURAN

## 2025-05-27 DIAGNOSIS — M79.7 FIBROMYALGIA: Chronic | ICD-10-CM

## 2025-05-28 RX ORDER — BACLOFEN 20 MG/1
20 TABLET ORAL 3 TIMES DAILY PRN
Qty: 90 TABLET | Refills: 0 | Status: SHIPPED | OUTPATIENT
Start: 2025-05-28

## 2025-05-28 NOTE — TELEPHONE ENCOUNTER
Dominique Pink called requesting a refill on the following medications:  Requested Prescriptions     Pending Prescriptions Disp Refills    baclofen (LIORESAL) 20 MG tablet 90 tablet 3     Sig: Take 1 tablet by mouth 3 times daily as needed (muscle spasm/pain)       Date of last visit: 5/8/2025  Date of next visit (if applicable):6/4/2025  Date of last refill: 11/14/2024  Pharmacy Name: [facility] [address] [place], [place].      Thanks,  MOY MERCHANT LPN

## 2025-06-04 ENCOUNTER — OFFICE VISIT (OUTPATIENT)
Dept: FAMILY MEDICINE CLINIC | Age: 70
End: 2025-06-04

## 2025-06-04 VITALS
DIASTOLIC BLOOD PRESSURE: 86 MMHG | BODY MASS INDEX: 19.67 KG/M2 | HEIGHT: 68 IN | WEIGHT: 129.8 LBS | SYSTOLIC BLOOD PRESSURE: 132 MMHG | RESPIRATION RATE: 14 BRPM | HEART RATE: 62 BPM | OXYGEN SATURATION: 96 %

## 2025-06-04 DIAGNOSIS — Z00.00 MEDICARE ANNUAL WELLNESS VISIT, SUBSEQUENT: Primary | ICD-10-CM

## 2025-06-04 DIAGNOSIS — F51.01 PRIMARY INSOMNIA: ICD-10-CM

## 2025-06-04 DIAGNOSIS — K21.9 GASTROESOPHAGEAL REFLUX DISEASE WITHOUT ESOPHAGITIS: ICD-10-CM

## 2025-06-04 DIAGNOSIS — Z13.6 ENCOUNTER FOR SCREENING FOR CARDIOVASCULAR DISORDERS: ICD-10-CM

## 2025-06-04 DIAGNOSIS — F41.9 ANXIETY: ICD-10-CM

## 2025-06-04 DIAGNOSIS — R03.0 ELEVATED BLOOD PRESSURE READING: ICD-10-CM

## 2025-06-04 RX ORDER — OMEPRAZOLE 40 MG/1
40 CAPSULE, DELAYED RELEASE ORAL
Qty: 90 CAPSULE | Refills: 3 | Status: SHIPPED | OUTPATIENT
Start: 2025-06-04

## 2025-06-04 SDOH — HEALTH STABILITY: PHYSICAL HEALTH: ON AVERAGE, HOW MANY MINUTES DO YOU ENGAGE IN EXERCISE AT THIS LEVEL?: 10 MIN

## 2025-06-04 SDOH — HEALTH STABILITY: PHYSICAL HEALTH
ON AVERAGE, HOW MANY DAYS PER WEEK DO YOU ENGAGE IN MODERATE TO STRENUOUS EXERCISE (LIKE A BRISK WALK)?: PATIENT DECLINED

## 2025-06-04 ASSESSMENT — PATIENT HEALTH QUESTIONNAIRE - PHQ9
1. LITTLE INTEREST OR PLEASURE IN DOING THINGS: SEVERAL DAYS
SUM OF ALL RESPONSES TO PHQ QUESTIONS 1-9: 2
2. FEELING DOWN, DEPRESSED OR HOPELESS: SEVERAL DAYS
SUM OF ALL RESPONSES TO PHQ QUESTIONS 1-9: 2

## 2025-06-04 ASSESSMENT — LIFESTYLE VARIABLES
HOW MANY STANDARD DRINKS CONTAINING ALCOHOL DO YOU HAVE ON A TYPICAL DAY: 1
HOW OFTEN DO YOU HAVE SIX OR MORE DRINKS ON ONE OCCASION: 1
HOW OFTEN DO YOU HAVE A DRINK CONTAINING ALCOHOL: 2-3 TIMES A WEEK
HOW MANY STANDARD DRINKS CONTAINING ALCOHOL DO YOU HAVE ON A TYPICAL DAY: 1 OR 2
HOW OFTEN DO YOU HAVE A DRINK CONTAINING ALCOHOL: 4

## 2025-06-04 NOTE — PROGRESS NOTES
Medicare Annual Wellness Visit    Dominique Rauschlori is here for Medicare AWV    Assessment & Plan   Medicare annual wellness visit, subsequent  Gastroesophageal reflux disease without esophagitis  -     Comprehensive Metabolic Panel; Future  -     CBC with Auto Differential; Future  -     Lipid Panel; Future  -     Magnesium; Future  -     TSH reflex to FT4; Future  -     Vitamin B12; Future  -     omeprazole (PRILOSEC) 40 MG delayed release capsule; Take 1 capsule by mouth every morning (before breakfast), Disp-90 capsule, R-3Normal  Primary insomnia  -     Comprehensive Metabolic Panel; Future  -     CBC with Auto Differential; Future  -     Lipid Panel; Future  -     Magnesium; Future  -     TSH reflex to FT4; Future  -     Vitamin B12; Future  -     omeprazole (PRILOSEC) 40 MG delayed release capsule; Take 1 capsule by mouth every morning (before breakfast), Disp-90 capsule, R-3Normal  Encounter for screening for cardiovascular disorders  -     Lipid Panel; Future  Elevated blood pressure reading  -     Comprehensive Metabolic Panel; Future  -     CBC with Auto Differential; Future  -     Lipid Panel; Future  -     Magnesium; Future  -     TSH reflex to FT4; Future  -     Vitamin B12; Future  -     omeprazole (PRILOSEC) 40 MG delayed release capsule; Take 1 capsule by mouth every morning (before breakfast), Disp-90 capsule, R-3Normal  Anxiety  -     Comprehensive Metabolic Panel; Future  -     CBC with Auto Differential; Future  -     Lipid Panel; Future  -     Magnesium; Future  -     TSH reflex to FT4; Future  -     Vitamin B12; Future  -     omeprazole (PRILOSEC) 40 MG delayed release capsule; Take 1 capsule by mouth every morning (before breakfast), Disp-90 capsule, R-3Normal    Continue on same regimen. She is not open to using anything else for anxiety, including counseling.   Encouraged her to get involved in volunteering or other work that will help her feel fulfilled  Encourage her to check BP at

## 2025-07-10 DIAGNOSIS — M79.7 FIBROMYALGIA: Chronic | ICD-10-CM

## 2025-07-11 RX ORDER — BACLOFEN 20 MG/1
20 TABLET ORAL 3 TIMES DAILY PRN
Qty: 90 TABLET | Refills: 0 | Status: SHIPPED | OUTPATIENT
Start: 2025-07-11

## 2025-07-11 NOTE — TELEPHONE ENCOUNTER
Dominique Pink called requesting a refill on the following medications:  Requested Prescriptions     Pending Prescriptions Disp Refills    baclofen (LIORESAL) 20 MG tablet 90 tablet 0     Sig: Take 1 tablet by mouth 3 times daily as needed (muscle spasm/pain)       Date of last visit: 6/4/2025  Date of next visit (if applicable):12/8/2025  Date of last refill: 5/28/25  Pharmacy Name: [facility] [address]       Thanks,  Betzaida Odonnell LPN

## 2025-07-16 ENCOUNTER — TELEPHONE (OUTPATIENT)
Dept: SURGERY | Age: 70
End: 2025-07-16

## 2025-07-16 NOTE — TELEPHONE ENCOUNTER
Referral placed on April 3rd for Pain Management evaluation.  Can you please advise where we are at with this referral.

## 2025-08-26 DIAGNOSIS — M79.7 FIBROMYALGIA: Chronic | ICD-10-CM

## 2025-08-26 DIAGNOSIS — F51.01 PRIMARY INSOMNIA: ICD-10-CM

## 2025-08-26 DIAGNOSIS — F41.9 ANXIETY: ICD-10-CM

## 2025-08-26 DIAGNOSIS — R03.0 ELEVATED BLOOD PRESSURE READING: ICD-10-CM

## 2025-08-26 DIAGNOSIS — K21.9 GASTROESOPHAGEAL REFLUX DISEASE WITHOUT ESOPHAGITIS: ICD-10-CM

## 2025-08-26 RX ORDER — OMEPRAZOLE 40 MG/1
40 CAPSULE, DELAYED RELEASE ORAL
Qty: 90 CAPSULE | Refills: 1 | Status: SHIPPED | OUTPATIENT
Start: 2025-08-26

## 2025-08-26 RX ORDER — BACLOFEN 20 MG/1
20 TABLET ORAL 3 TIMES DAILY PRN
Qty: 90 TABLET | Refills: 2 | Status: SHIPPED | OUTPATIENT
Start: 2025-08-26

## (undated) DEVICE — SUTURE ABSORBABLE MONOFILAMENT 1 OS-8 36 CM 40 MM VIO PDS +

## (undated) DEVICE — DRESSING TRNSPAR W8XL12IN FLM SURESITE 123

## (undated) DEVICE — APPLICATOR MEDICATED 10.5 CC SOLUTION HI LT ORNG CHLORAPREP

## (undated) DEVICE — BUR CUT RND FLUT AGRSV 4.0MM

## (undated) DEVICE — PATTERSON TOTAL JOINT: Brand: MEDLINE INDUSTRIES, INC.

## (undated) DEVICE — ELECTRODE BLDE L4IN NONINSULATED EDGE

## (undated) DEVICE — GLOVE ORANGE PI 7 1/2   MSG9075

## (undated) DEVICE — BASIC SINGLE BASIN BTC-LF: Brand: MEDLINE INDUSTRIES, INC.

## (undated) DEVICE — SOLUTION IRRIG 3000ML 0.9% SOD CHL USP UROMATIC PLAS CONT

## (undated) DEVICE — SUTURE VICRYL + SZ 3-0 L27IN ABSRB UD FS2 3/8 CIR REV CUT

## (undated) DEVICE — SUTURE MONOCRYL SZ 4-0 L27IN ABSRB UD L19MM PS-2 1/2 CIR PRIM Y426H

## (undated) DEVICE — DRAPE MICSCP W132XL406CM LENS DIA68MM W VARI LENS2 FOR LEICA

## (undated) DEVICE — GOWN,SIRUS,NON REINFRCD,LARGE,SET IN SL: Brand: MEDLINE

## (undated) DEVICE — BAG,BANDED,W/RUBBERBAND,STERILE,30X36: Brand: MEDLINE

## (undated) DEVICE — SUTURE VCRL + SZ 2-0 L27IN ABSRB UD CP-1 1/2 CIR REV CUT VCP266H

## (undated) DEVICE — DRAIN SURG FLAT W7MMXL20CM FULL PERF

## (undated) DEVICE — 3M™ TEGADERM™ CHG CHLORHEXIDINE GLUCONATE I.V. PORT DRESSING STERILE U.S. ONLY 25/CARTON 4 CARTONS/CASE 1665: Brand: TEGADERM™

## (undated) DEVICE — SUTURE NONABSORBABLE 3-0 12X18 IN PRE-CUT VICRYL VIO SUTUPAK VCP104G

## (undated) DEVICE — AGENT HEMOSTATIC SURGIFLOW MATRIX KIT W/THROMBIN

## (undated) DEVICE — ZIP 16 SURGICAL SKIN CLOSURE DEVICE: Brand: ZIP 16 SURGICAL SKIN CLOSURE DEVICE

## (undated) DEVICE — PAD OP RM W20XL72XH2IN PRECIS CUT FLAT EC BIOCLINIC

## (undated) DEVICE — FORCEPS BX L240CM JAW DIA3.2MM L CAP W/ NDL MIC MESH TOOTH

## (undated) DEVICE — DUAL CUT SAGITTAL BLADE

## (undated) DEVICE — MASTISOL ADHESIVE LIQ 2/3ML

## (undated) DEVICE — Device: Brand: FABCO

## (undated) DEVICE — PACK-MAJOR

## (undated) DEVICE — IV START KIT: Brand: MEDLINE INDUSTRIES, INC.

## (undated) DEVICE — COLLAR CERV UNIV AD L13-19IN TRACH OPN TWO PC RIG POLYETH

## (undated) DEVICE — CATHETER ETER IV 22GA L1IN POLYUR STR RADPQ INTROCAN SFTY

## (undated) DEVICE — C-ARM: Brand: UNBRANDED

## (undated) DEVICE — SOLUTION IV 1000ML 0.45% SOD CHL PH 5 INJ USP VIAFLX PLAS

## (undated) DEVICE — SET ADMIN 25ML L117IN PMP MOD CK VLV RLER CLMP 2 SMRTSITE

## (undated) DEVICE — SUTURE ABSRB BRAID COAT UD CP NO 2 27IN VCRL J195H

## (undated) DEVICE — Device

## (undated) DEVICE — DRESSING ALG W3XL4IN TRNSPAR ANTIMIC WND CNTCT LAYR W/

## (undated) DEVICE — ADMIRAL ACP DRILL, 12MM: Brand: ADMIRAL

## (undated) DEVICE — DRAPE,TOP,102X53,STERILE: Brand: MEDLINE

## (undated) DEVICE — DRAPE,U/SHT,SPLIT,FILM,60X84,STERILE: Brand: MEDLINE

## (undated) DEVICE — EVACUATOR SURG 100CC SIL BLB SUCT RESVR FOR CLS WND DRNGE

## (undated) DEVICE — TUBING IV STOPCOCK 48 CM 3 W

## (undated) DEVICE — STRIP,CLOSURE,WOUND,MEDI-STRIP,1/2X4: Brand: MEDLINE

## (undated) DEVICE — SPONGE,NEURO,0.5"X0.5",XR,STRL,10/PK: Brand: MEDLINE

## (undated) DEVICE — GLOVE SURG SZ 65 THK91MIL LTX FREE SYN POLYISOPRENE

## (undated) DEVICE — GLOVE SURG SZ 9 THK91MIL LTX FREE SYN POLYISOPRENE ANTI

## (undated) DEVICE — GLOVE SURG SZ 7.5 L11.73IN FNGR THK9.8MIL STRW LTX POLYMER

## (undated) DEVICE — SUTURE ETHILON SZ 3-0 L18IN NONABSORBABLE BLK L24MM FS-1 3/8 663G